# Patient Record
Sex: MALE | Race: OTHER | NOT HISPANIC OR LATINO
[De-identification: names, ages, dates, MRNs, and addresses within clinical notes are randomized per-mention and may not be internally consistent; named-entity substitution may affect disease eponyms.]

---

## 2017-04-20 ENCOUNTER — APPOINTMENT (OUTPATIENT)
Dept: SURGERY | Facility: CLINIC | Age: 77
End: 2017-04-20

## 2017-04-20 VITALS
BODY MASS INDEX: 25.1 KG/M2 | WEIGHT: 147 LBS | HEIGHT: 64 IN | HEART RATE: 65 BPM | DIASTOLIC BLOOD PRESSURE: 83 MMHG | SYSTOLIC BLOOD PRESSURE: 199 MMHG

## 2017-04-20 DIAGNOSIS — E04.1 NONTOXIC SINGLE THYROID NODULE: ICD-10-CM

## 2017-04-20 DIAGNOSIS — Z86.79 PERSONAL HISTORY OF OTHER DISEASES OF THE CIRCULATORY SYSTEM: ICD-10-CM

## 2017-04-21 RX ORDER — LEVOTHYROXINE SODIUM 0.17 MG/1
TABLET ORAL
Refills: 0 | Status: ACTIVE | COMMUNITY

## 2017-04-26 ENCOUNTER — APPOINTMENT (OUTPATIENT)
Dept: VASCULAR SURGERY | Facility: CLINIC | Age: 77
End: 2017-04-26
Payer: MEDICARE

## 2017-04-26 VITALS
DIASTOLIC BLOOD PRESSURE: 67 MMHG | HEART RATE: 64 BPM | BODY MASS INDEX: 25.1 KG/M2 | TEMPERATURE: 97.8 F | HEIGHT: 64 IN | SYSTOLIC BLOOD PRESSURE: 172 MMHG | WEIGHT: 147 LBS

## 2017-04-26 DIAGNOSIS — Z86.59 PERSONAL HISTORY OF OTHER MENTAL AND BEHAVIORAL DISORDERS: ICD-10-CM

## 2017-04-26 DIAGNOSIS — M79.605 PAIN IN RIGHT LEG: ICD-10-CM

## 2017-04-26 DIAGNOSIS — M79.604 PAIN IN RIGHT LEG: ICD-10-CM

## 2017-04-26 DIAGNOSIS — Z87.891 PERSONAL HISTORY OF NICOTINE DEPENDENCE: ICD-10-CM

## 2017-04-26 DIAGNOSIS — Z09 ENCOUNTER FOR FOLLOW-UP EXAMINATION AFTER COMPLETED TREATMENT FOR CONDITIONS OTHER THAN MALIGNANT NEOPLASM: ICD-10-CM

## 2017-04-26 DIAGNOSIS — Z86.79 PERSONAL HISTORY OF OTHER DISEASES OF THE CIRCULATORY SYSTEM: ICD-10-CM

## 2017-04-26 DIAGNOSIS — G25.81 RESTLESS LEGS SYNDROME: ICD-10-CM

## 2017-04-26 DIAGNOSIS — Z86.39 PERSONAL HISTORY OF OTHER ENDOCRINE, NUTRITIONAL AND METABOLIC DISEASE: ICD-10-CM

## 2017-04-26 PROCEDURE — 99214 OFFICE O/P EST MOD 30 MIN: CPT

## 2017-04-26 PROCEDURE — 93970 EXTREMITY STUDY: CPT

## 2017-04-27 ENCOUNTER — OTHER (OUTPATIENT)
Age: 77
End: 2017-04-27

## 2017-05-11 ENCOUNTER — RESULT REVIEW (OUTPATIENT)
Age: 77
End: 2017-05-11

## 2017-05-16 RX ORDER — CLONAZEPAM 1 MG/1
1 TABLET ORAL
Qty: 60 | Refills: 0 | Status: ACTIVE | COMMUNITY
Start: 2016-11-04

## 2017-05-16 RX ORDER — AZITHROMYCIN 250 MG/1
250 TABLET, FILM COATED ORAL
Qty: 6 | Refills: 0 | Status: ACTIVE | COMMUNITY
Start: 2016-10-28

## 2017-05-16 RX ORDER — VORTIOXETINE 20 MG/1
20 TABLET, FILM COATED ORAL
Qty: 90 | Refills: 0 | Status: ACTIVE | COMMUNITY
Start: 2016-10-20

## 2017-05-16 RX ORDER — SILDENAFIL CITRATE 100 MG/1
100 TABLET, FILM COATED ORAL
Qty: 10 | Refills: 0 | Status: ACTIVE | COMMUNITY
Start: 2017-04-03

## 2017-05-16 RX ORDER — SOLIFENACIN SUCCINATE 10 MG/1
10 TABLET, FILM COATED ORAL
Qty: 90 | Refills: 0 | Status: ACTIVE | COMMUNITY
Start: 2017-04-03

## 2017-05-16 RX ORDER — AMLODIPINE BESYLATE 10 MG/1
10 TABLET ORAL
Qty: 90 | Refills: 0 | Status: ACTIVE | COMMUNITY
Start: 2017-04-04

## 2017-05-16 RX ORDER — OMEPRAZOLE 40 MG/1
40 CAPSULE, DELAYED RELEASE ORAL
Qty: 30 | Refills: 0 | Status: ACTIVE | COMMUNITY
Start: 2017-03-13

## 2017-05-16 RX ORDER — FLUTICASONE PROPIONATE 50 UG/1
50 SPRAY, METERED NASAL
Qty: 16 | Refills: 0 | Status: ACTIVE | COMMUNITY
Start: 2017-03-22

## 2017-05-16 RX ORDER — DONEPEZIL HYDROCHLORIDE 10 MG/1
10 TABLET, ORALLY DISINTEGRATING ORAL
Qty: 90 | Refills: 0 | Status: ACTIVE | COMMUNITY
Start: 2017-04-17

## 2017-05-16 RX ORDER — PANCRELIPASE 36000; 180000; 114000 [USP'U]/1; [USP'U]/1; [USP'U]/1
36000-114000 CAPSULE, DELAYED RELEASE PELLETS ORAL
Qty: 120 | Refills: 0 | Status: ACTIVE | COMMUNITY
Start: 2017-03-13

## 2017-05-16 RX ORDER — MIRTAZAPINE 7.5 MG/1
7.5 TABLET, FILM COATED ORAL
Qty: 90 | Refills: 0 | Status: ACTIVE | COMMUNITY
Start: 2016-10-17

## 2017-05-16 RX ORDER — AMOXICILLIN AND CLAVULANATE POTASSIUM 875; 125 MG/1; MG/1
875-125 TABLET, COATED ORAL
Qty: 20 | Refills: 0 | Status: ACTIVE | COMMUNITY
Start: 2017-03-22

## 2017-05-16 RX ORDER — METOPROLOL SUCCINATE 50 MG/1
50 TABLET, EXTENDED RELEASE ORAL
Qty: 90 | Refills: 0 | Status: ACTIVE | COMMUNITY
Start: 2016-10-14

## 2017-06-07 ENCOUNTER — OTHER (OUTPATIENT)
Age: 77
End: 2017-06-07

## 2017-07-19 ENCOUNTER — APPOINTMENT (OUTPATIENT)
Dept: SURGERY | Facility: HOSPITAL | Age: 77
End: 2017-07-19

## 2017-10-09 ENCOUNTER — APPOINTMENT (OUTPATIENT)
Dept: NUCLEAR MEDICINE | Facility: HOSPITAL | Age: 77
End: 2017-10-09
Payer: MEDICARE

## 2017-10-09 ENCOUNTER — OUTPATIENT (OUTPATIENT)
Dept: OUTPATIENT SERVICES | Facility: HOSPITAL | Age: 77
LOS: 1 days | End: 2017-10-09
Payer: MEDICARE

## 2017-10-09 DIAGNOSIS — C73 MALIGNANT NEOPLASM OF THYROID GLAND: ICD-10-CM

## 2017-10-09 PROCEDURE — 99203 OFFICE O/P NEW LOW 30 MIN: CPT

## 2017-10-10 ENCOUNTER — APPOINTMENT (OUTPATIENT)
Dept: NUCLEAR MEDICINE | Facility: HOSPITAL | Age: 77
End: 2017-10-10

## 2017-10-11 ENCOUNTER — APPOINTMENT (OUTPATIENT)
Dept: NUCLEAR MEDICINE | Facility: HOSPITAL | Age: 77
End: 2017-10-11

## 2017-10-13 ENCOUNTER — APPOINTMENT (OUTPATIENT)
Dept: NUCLEAR MEDICINE | Facility: HOSPITAL | Age: 77
End: 2017-10-13

## 2017-10-13 PROCEDURE — 96372 THER/PROPH/DIAG INJ SC/IM: CPT

## 2017-10-13 PROCEDURE — 78018 THYROID MET IMAGING BODY: CPT

## 2017-10-13 PROCEDURE — 78020 THYROID MET UPTAKE: CPT

## 2017-10-13 PROCEDURE — A9528: CPT

## 2017-10-13 PROCEDURE — 78018 THYROID MET IMAGING BODY: CPT | Mod: 26

## 2017-10-13 PROCEDURE — 78020 THYROID MET UPTAKE: CPT | Mod: 26

## 2017-10-16 ENCOUNTER — OUTPATIENT (OUTPATIENT)
Dept: OUTPATIENT SERVICES | Facility: HOSPITAL | Age: 77
LOS: 1 days | End: 2017-10-16
Payer: MEDICARE

## 2017-10-16 ENCOUNTER — APPOINTMENT (OUTPATIENT)
Dept: NUCLEAR MEDICINE | Facility: HOSPITAL | Age: 77
End: 2017-10-16
Payer: MEDICARE

## 2017-10-16 DIAGNOSIS — C73 MALIGNANT NEOPLASM OF THYROID GLAND: ICD-10-CM

## 2017-10-17 ENCOUNTER — APPOINTMENT (OUTPATIENT)
Dept: NUCLEAR MEDICINE | Facility: HOSPITAL | Age: 77
End: 2017-10-17

## 2017-10-18 ENCOUNTER — APPOINTMENT (OUTPATIENT)
Dept: NUCLEAR MEDICINE | Facility: HOSPITAL | Age: 77
End: 2017-10-18

## 2017-10-18 PROCEDURE — 79005 NUCLEAR RX ORAL ADMIN: CPT | Mod: 26

## 2017-10-25 ENCOUNTER — APPOINTMENT (OUTPATIENT)
Dept: NUCLEAR MEDICINE | Facility: HOSPITAL | Age: 77
End: 2017-10-25

## 2017-10-25 PROCEDURE — 78018 THYROID MET IMAGING BODY: CPT | Mod: 26

## 2017-10-25 PROCEDURE — A9517: CPT

## 2017-10-25 PROCEDURE — 79005 NUCLEAR RX ORAL ADMIN: CPT

## 2017-10-25 PROCEDURE — 78018 THYROID MET IMAGING BODY: CPT

## 2017-10-25 PROCEDURE — 96372 THER/PROPH/DIAG INJ SC/IM: CPT

## 2018-06-19 ENCOUNTER — APPOINTMENT (OUTPATIENT)
Dept: INTERNAL MEDICINE | Facility: CLINIC | Age: 78
End: 2018-06-19

## 2018-06-19 VITALS
SYSTOLIC BLOOD PRESSURE: 130 MMHG | BODY MASS INDEX: 23.92 KG/M2 | WEIGHT: 135 LBS | OXYGEN SATURATION: 99 % | DIASTOLIC BLOOD PRESSURE: 60 MMHG | HEART RATE: 69 BPM | HEIGHT: 63 IN

## 2018-06-26 ENCOUNTER — APPOINTMENT (OUTPATIENT)
Dept: INTERNAL MEDICINE | Facility: CLINIC | Age: 78
End: 2018-06-26

## 2018-07-11 ENCOUNTER — APPOINTMENT (OUTPATIENT)
Dept: INTERNAL MEDICINE | Facility: CLINIC | Age: 78
End: 2018-07-11

## 2018-07-11 ENCOUNTER — APPOINTMENT (OUTPATIENT)
Dept: VASCULAR SURGERY | Facility: CLINIC | Age: 78
End: 2018-07-11
Payer: MEDICARE

## 2018-07-11 VITALS
TEMPERATURE: 98.6 F | SYSTOLIC BLOOD PRESSURE: 146 MMHG | WEIGHT: 135 LBS | DIASTOLIC BLOOD PRESSURE: 63 MMHG | HEIGHT: 64.5 IN | BODY MASS INDEX: 22.77 KG/M2 | HEART RATE: 66 BPM

## 2018-07-11 PROCEDURE — 93970 EXTREMITY STUDY: CPT

## 2018-07-11 PROCEDURE — 99212 OFFICE O/P EST SF 10 MIN: CPT

## 2018-07-12 ENCOUNTER — APPOINTMENT (OUTPATIENT)
Dept: INTERNAL MEDICINE | Facility: CLINIC | Age: 78
End: 2018-07-12

## 2018-07-19 ENCOUNTER — APPOINTMENT (OUTPATIENT)
Dept: INTERNAL MEDICINE | Facility: CLINIC | Age: 78
End: 2018-07-19

## 2018-07-20 ENCOUNTER — APPOINTMENT (OUTPATIENT)
Dept: CT IMAGING | Facility: IMAGING CENTER | Age: 78
End: 2018-07-20

## 2018-07-26 ENCOUNTER — APPOINTMENT (OUTPATIENT)
Dept: INTERNAL MEDICINE | Facility: CLINIC | Age: 78
End: 2018-07-26

## 2018-08-09 ENCOUNTER — APPOINTMENT (OUTPATIENT)
Dept: CT IMAGING | Facility: IMAGING CENTER | Age: 78
End: 2018-08-09
Payer: MEDICARE

## 2018-08-09 ENCOUNTER — OUTPATIENT (OUTPATIENT)
Dept: OUTPATIENT SERVICES | Facility: HOSPITAL | Age: 78
LOS: 1 days | End: 2018-08-09
Payer: MEDICARE

## 2018-08-09 DIAGNOSIS — Z00.8 ENCOUNTER FOR OTHER GENERAL EXAMINATION: ICD-10-CM

## 2018-08-09 PROCEDURE — 74177 CT ABD & PELVIS W/CONTRAST: CPT | Mod: 26

## 2018-08-09 PROCEDURE — 82565 ASSAY OF CREATININE: CPT

## 2018-08-09 PROCEDURE — 74177 CT ABD & PELVIS W/CONTRAST: CPT

## 2022-07-15 ENCOUNTER — INPATIENT (INPATIENT)
Facility: HOSPITAL | Age: 82
LOS: 11 days | Discharge: NURSING, REHAB OR LONG-TERM CARE | DRG: 40 | End: 2022-07-27
Attending: PSYCHIATRY & NEUROLOGY | Admitting: PSYCHIATRY & NEUROLOGY
Payer: MEDICARE

## 2022-07-15 VITALS — HEIGHT: 64 IN

## 2022-07-15 LAB
ALBUMIN SERPL ELPH-MCNC: 4.7 G/DL — SIGNIFICANT CHANGE UP (ref 3.3–5)
ALP SERPL-CCNC: 91 U/L — SIGNIFICANT CHANGE UP (ref 40–120)
ALT FLD-CCNC: 25 U/L — SIGNIFICANT CHANGE UP (ref 10–45)
ANION GAP SERPL CALC-SCNC: 14 MMOL/L — SIGNIFICANT CHANGE UP (ref 5–17)
AST SERPL-CCNC: 33 U/L — SIGNIFICANT CHANGE UP (ref 10–40)
BASOPHILS # BLD AUTO: 0.02 K/UL — SIGNIFICANT CHANGE UP (ref 0–0.2)
BASOPHILS NFR BLD AUTO: 0.4 % — SIGNIFICANT CHANGE UP (ref 0–2)
BILIRUB SERPL-MCNC: 0.5 MG/DL — SIGNIFICANT CHANGE UP (ref 0.2–1.2)
BUN SERPL-MCNC: 25 MG/DL — HIGH (ref 7–23)
CALCIUM SERPL-MCNC: 9.9 MG/DL — SIGNIFICANT CHANGE UP (ref 8.4–10.5)
CHLORIDE SERPL-SCNC: 101 MMOL/L — SIGNIFICANT CHANGE UP (ref 96–108)
CO2 SERPL-SCNC: 23 MMOL/L — SIGNIFICANT CHANGE UP (ref 22–31)
CREAT SERPL-MCNC: 0.93 MG/DL — SIGNIFICANT CHANGE UP (ref 0.5–1.3)
EGFR: 81 ML/MIN/1.73M2 — SIGNIFICANT CHANGE UP
EOSINOPHIL # BLD AUTO: 0.32 K/UL — SIGNIFICANT CHANGE UP (ref 0–0.5)
EOSINOPHIL NFR BLD AUTO: 6.6 % — HIGH (ref 0–6)
GLUCOSE SERPL-MCNC: 123 MG/DL — HIGH (ref 70–99)
HCT VFR BLD CALC: 35.8 % — LOW (ref 39–50)
HGB BLD-MCNC: 12 G/DL — LOW (ref 13–17)
IMM GRANULOCYTES NFR BLD AUTO: 0.2 % — SIGNIFICANT CHANGE UP (ref 0–1.5)
LYMPHOCYTES # BLD AUTO: 2.06 K/UL — SIGNIFICANT CHANGE UP (ref 1–3.3)
LYMPHOCYTES # BLD AUTO: 42.7 % — SIGNIFICANT CHANGE UP (ref 13–44)
MCHC RBC-ENTMCNC: 27.5 PG — SIGNIFICANT CHANGE UP (ref 27–34)
MCHC RBC-ENTMCNC: 33.5 GM/DL — SIGNIFICANT CHANGE UP (ref 32–36)
MCV RBC AUTO: 81.9 FL — SIGNIFICANT CHANGE UP (ref 80–100)
MONOCYTES # BLD AUTO: 0.52 K/UL — SIGNIFICANT CHANGE UP (ref 0–0.9)
MONOCYTES NFR BLD AUTO: 10.8 % — SIGNIFICANT CHANGE UP (ref 2–14)
NEUTROPHILS # BLD AUTO: 1.9 K/UL — SIGNIFICANT CHANGE UP (ref 1.8–7.4)
NEUTROPHILS NFR BLD AUTO: 39.3 % — LOW (ref 43–77)
NRBC # BLD: 0 /100 WBCS — SIGNIFICANT CHANGE UP (ref 0–0)
PLATELET # BLD AUTO: 118 K/UL — LOW (ref 150–400)
POTASSIUM SERPL-MCNC: 4 MMOL/L — SIGNIFICANT CHANGE UP (ref 3.5–5.3)
POTASSIUM SERPL-SCNC: 4 MMOL/L — SIGNIFICANT CHANGE UP (ref 3.5–5.3)
PROT SERPL-MCNC: 7.8 G/DL — SIGNIFICANT CHANGE UP (ref 6–8.3)
RBC # BLD: 4.37 M/UL — SIGNIFICANT CHANGE UP (ref 4.2–5.8)
RBC # FLD: 12.8 % — SIGNIFICANT CHANGE UP (ref 10.3–14.5)
SODIUM SERPL-SCNC: 138 MMOL/L — SIGNIFICANT CHANGE UP (ref 135–145)
TROPONIN T, HIGH SENSITIVITY RESULT: 15 NG/L — SIGNIFICANT CHANGE UP (ref 0–51)
WBC # BLD: 4.83 K/UL — SIGNIFICANT CHANGE UP (ref 3.8–10.5)
WBC # FLD AUTO: 4.83 K/UL — SIGNIFICANT CHANGE UP (ref 3.8–10.5)

## 2022-07-15 PROCEDURE — 0042T: CPT | Mod: MA

## 2022-07-15 PROCEDURE — 70496 CT ANGIOGRAPHY HEAD: CPT | Mod: 26,MA

## 2022-07-15 PROCEDURE — 70498 CT ANGIOGRAPHY NECK: CPT | Mod: 26,MA

## 2022-07-15 PROCEDURE — 99291 CRITICAL CARE FIRST HOUR: CPT | Mod: CS,GC

## 2022-07-15 PROCEDURE — 93010 ELECTROCARDIOGRAM REPORT: CPT | Mod: GC

## 2022-07-15 RX ORDER — ALTEPLASE 100 MG
49 KIT INTRAVENOUS ONCE
Refills: 0 | Status: COMPLETED | OUTPATIENT
Start: 2022-07-15 | End: 2022-07-15

## 2022-07-15 RX ORDER — SODIUM CHLORIDE 9 MG/ML
50 INJECTION INTRAMUSCULAR; INTRAVENOUS; SUBCUTANEOUS
Refills: 0 | Status: DISCONTINUED | OUTPATIENT
Start: 2022-07-15 | End: 2022-07-17

## 2022-07-15 RX ORDER — NICARDIPINE HYDROCHLORIDE 30 MG/1
5 CAPSULE, EXTENDED RELEASE ORAL
Qty: 40 | Refills: 0 | Status: DISCONTINUED | OUTPATIENT
Start: 2022-07-15 | End: 2022-07-17

## 2022-07-15 RX ORDER — ALTEPLASE 100 MG
5.4 KIT INTRAVENOUS ONCE
Refills: 0 | Status: COMPLETED | OUTPATIENT
Start: 2022-07-15 | End: 2022-07-15

## 2022-07-15 RX ORDER — ATORVASTATIN CALCIUM 80 MG/1
80 TABLET, FILM COATED ORAL AT BEDTIME
Refills: 0 | Status: DISCONTINUED | OUTPATIENT
Start: 2022-07-15 | End: 2022-07-27

## 2022-07-15 RX ADMIN — ALTEPLASE 49 MILLIGRAM(S): KIT at 23:42

## 2022-07-15 RX ADMIN — NICARDIPINE HYDROCHLORIDE 25 MG/HR: 30 CAPSULE, EXTENDED RELEASE ORAL at 23:57

## 2022-07-15 RX ADMIN — ALTEPLASE 324 MILLIGRAM(S): KIT at 23:41

## 2022-07-15 NOTE — ED PROVIDER NOTE - CLINICAL SUMMARY MEDICAL DECISION MAKING FREE TEXT BOX
Daniel Mejia, PGY-3- 82 year old male with L side facial droop, L arm weakness, L leg weakness, dysarthria 30 min prior to arrival. Code stroke on initial presentation. Pt with Penumbra of 10 mL localized to the right MCA territory on CTA. tPA consented and administered by neuro. Plan for admission to neuro stroke unit.

## 2022-07-15 NOTE — ED PROVIDER NOTE - OBJECTIVE STATEMENT
Daniel Mejia, PGY-3- 82 year old male with a pmhx of HTN, HLD, thyroid CA, Alzheimer, on daily 81 mg ASA, is brought to ED by EMS for evaluation of sudden onset dysarthria and L side weakness. Per EMS, pt was speaking with family when he had a sudden change in voice 30 min prior to arrival. Pt never had before NO hx of CVA/TIA. Lives at home with wife. Daniel Mejia, PGY-3- 82 year old male with a pmhx of HTN, HLD, thyroid CA, Alzheimer, on daily 81 mg ASA, is brought to ED by EMS for evaluation of sudden onset dysarthria and L side weakness. Per EMS, pt was speaking with family when he had a sudden change in voice 30 min prior to arrival. Pt never had before NO hx of CVA/TIA. Lives at home with wife.    Attendinyo male presents with dysarthria and left sided weakness.  last normal was around 10pm.  was acting normally until this time.

## 2022-07-15 NOTE — ED PROVIDER NOTE - PROGRESS NOTE DETAILS
received sign out from Dr Alaniz pending neuro recs, HCTr, and labs, ua. 82yr M on asa no prior strokes w acute left sided weakness and severe dysarthria from 10pm. DJ Daniel Mejia, PGY-3- patient received at sign out. Patient seen and evaluated by neuro. No contraindications to tPA. tPA consented and administered by neuro. Pt currently on tPA infusion. Will be admitted to stroke unit. Pt in NAD. received sign out from Dr Alaniz pending neuro recs, HCTr, and labs, ua. 82yr M on asa no prior strokes w acute left sided weakness and severe dysarthria from 10pm. STERLING  neurology dicussed and consented pt for tpa. monitored patient throughout, slight improvement in dysarthria. STERLING

## 2022-07-15 NOTE — ED PROVIDER NOTE - OTHER FINDINGS
Pt alert but confused this shift. Pt does not know where she is or that she had surgery. Pt reoriented. Call light within reach. Bed alarm on. Will continue to monitor and assess per unit protocol.   Electronically signed by Erasmo Knight RN on 5/23/2019 at 3:54 AM QTc 445 ms

## 2022-07-15 NOTE — H&P ADULT - NSHPPHYSICALEXAM_GEN_ALL_CORE
MS: Eyes open. Responds to verbal stimuli. Follows simple commands. Knows his name only. Does not know month. When asked of age, only says 1940.   Language: interval resolution of mild aphasia, remains with severe dysarthria  CN: PERRL 3mm b/l. V1-3 seneation intact to LT. Dense L facial droop sparing the forehead. VFF. EOMI. Tongue midline without atrophy  Motor: RUE 3/5, LUE fluctuating at its best 3/5, LLE fluctuating at its best 4/5, and RLE 5/5.   Sensation: Intact to LT throughout  Coordination: LUE FTN with dysmetria  Gait: Deferred due to L sided weakness

## 2022-07-15 NOTE — H&P ADULT - ASSESSMENT
82 y.o. M with PMH of thyroid ca s/p thyroidectomy in 2018, hyperthyroid, HTN, TIA (15-20 years ago), alzheimer's disease, and depression presented to [facility] ED due to L facial droop, L sided weakness and slurring of speech. LKN 22:00 hr on 7/15. Neuro exam fluctuating but remains with L hemiparesis and severe dysarthria. NIHSS 11 -> NIHSS 6 (does not know month, age, dysarthric, LUE drift, LUE ataxic). mRS 0.     tPA considered at 23:00 hr on 7/15  tPA consented with daughter Yanet at [phone] - no hx of recent GI bleeding, ICH, surgery  Establishment of IV and BP titration started 23:10. BP changes as noted below:  188/72 @ 23:07  177/93 @ 23:93  203/83 @ 23:12  197/86 @ 23:15  200/79 @ 23:18  205/79 @ 23:22  Given labetalol 10 mg IV @ 23:25 hr  188/67 @ 23:27  Given cardene at 23:28, started at 5 and titrated up  198/79 @ 23:30  154/57 @ 23:39  Pushed tPA 5.4 mg IV x1 @ 23:41-42. Bolus 49 mg started at 23:43     Impression: L hemiparesis and dysarthria likely due to R MCA acute ischemic stroke. Mechanism is unclear    Plan:  Meds  [] Hold AC/AP & Mechanical DVT prophylaxis with SCDs for 24 hrs and start chemical DVT PPx after 24 hours post tPA   [] atorvastatin 80mg qhs    Imaging  [] MRI brain w/o cont  [] head CT 24 hours post tpa or STAT head CT if neurological exam worsens  [] TTE with bubble study    Labs  [] A1c and Lipid panel    Other  [] Permissive HTN up to 180/105 mmHg for first 24 hours after the symptom onset followed by gradual normotension.   [] NPO until clears Dysphagia screen after 6 hours post tPA, otherwise Swallow evaluation  [] Telemetry monitoring   [] BGM goal 140-180  [] Neuro checks q2hr   [] PT/ OT  [] Stroke Education    Case discussed with stroke fellow Dr. Quiroz under supervision of stroke attending Dr. Chadwick 82 y.o. M with PMH of thyroid ca s/p thyroidectomy in 2018, hyperthyroid, HTN, TIA (15-20 years ago), alzheimer's disease, and depression presented to [facility] ED due to L facial droop, L sided weakness and slurring of speech. LKN 22:00 hr on 7/15. Neuro exam fluctuating but remains with L hemiparesis and severe dysarthria. NIHSS 11 -> NIHSS 6 (does not know month, age, dysarthric, LUE drift, LUE ataxic). mRS 0.     LKN 22:00 hr o 7/15  tPA considered at 23:00 hr on 7/15  tPA consented with daughter Yanet at [phone] - no hx of recent GI bleeding, ICH, surgery  Establishment of IV and BP titration started 23:10. BP changes as noted below:  188/72 @ 23:07  177/93 @ 23:93  203/83 @ 23:12  197/86 @ 23:15  200/79 @ 23:18  205/79 @ 23:22  Given labetalol 10 mg IV @ 23:25 hr  188/67 @ 23:27  Given cardene at 23:28, started at 5 and titrated up  198/79 @ 23:30  154/57 @ 23:39  Pushed tPA 5.4 mg IV x1 @ 23:41-42 hr on 7/15. Bolus 49 mg started at 23:43  hr on 7/15  tPA administered > 30 min due to aggressive HTN management with IV medications    Impression: L hemiparesis and dysarthria likely due to R MCA acute ischemic stroke. Mechanism is unclear    Plan:  Meds  [] Hold AC/AP & Mechanical DVT prophylaxis with SCDs for 24 hrs and start chemical DVT PPx after 24 hours post tPA   [] atorvastatin 80mg qhs    Imaging  [] MRI brain w/o cont  [] head CT 24 hours post tpa or STAT head CT if neurological exam worsens  [] TTE with bubble study    Labs  [] A1c and Lipid panel    Other  [] Permissive HTN up to 180/105 mmHg for first 24 hours after the symptom onset followed by gradual normotension.   [] NPO until clears Dysphagia screen after 6 hours post tPA, otherwise Swallow evaluation  [] Telemetry monitoring   [] BGM goal 140-180  [] Neuro checks q2hr   [] PT/ OT  [] Stroke Education    Case discussed with stroke fellow Dr. Quiroz under supervision of stroke attending Dr. Chadwick 82 y.o. M with PMH of thyroid ca s/p thyroidectomy in 2018, hyperthyroid, HTN, TIA (15-20 years ago), alzheimer's disease, and depression presented to [facility] ED due to L facial droop, L sided weakness and slurring of speech. LKN 22:00 hr on 7/15. Neuro exam fluctuating but remains with L hemiparesis and severe dysarthria. NIHSS 11 -> NIHSS 6 (does not know month, age, dysarthric, LUE drift, LUE ataxic). mRS 0.     LKN 22:00 hr o 7/15  tPA considered at 23:00 hr on 7/15  tPA consented with daughter Yanet at [phone] - no hx of recent GI bleeding, ICH, surgery  Establishment of IV and BP titration started 23:10. BP changes as noted below:  188/72 @ 23:07  177/93 @ 23:93  203/83 @ 23:12  197/86 @ 23:15  200/79 @ 23:18  205/79 @ 23:22  Given labetalol 10 mg IV @ 23:25 hr  188/67 @ 23:27  Given cardene at 23:28, started at 5 and titrated up  198/79 @ 23:30  154/57 @ 23:39  Pushed tPA 5.4 mg IV x1 @ 23:41-42 hr on 7/15. Bolus 49 mg started at 23:43  hr on 7/15  tPA administered > 30 min due to aggressive HTN management with IV medications    Not a thrombectomy candidate due to no LVO    Impression: L hemiparesis and dysarthria likely due to R MCA acute ischemic stroke. Mechanism is unclear    Plan:  Meds  [] Hold AC/AP & Mechanical DVT prophylaxis with SCDs for 24 hrs and start chemical DVT PPx after 24 hours post tPA   [] atorvastatin 80mg qhs    Imaging  [] MRI brain w/o cont  [] head CT 24 hours post tpa or STAT head CT if neurological exam worsens  [] TTE with bubble study    Labs  [] A1c and Lipid panel    Other  [] Permissive HTN up to 180/105 mmHg for first 24 hours after the symptom onset followed by gradual normotension.   [] NPO until clears Dysphagia screen after 6 hours post tPA, otherwise Swallow evaluation  [] Telemetry monitoring   [] BGM goal 140-180  [] Neuro checks q2hr   [] PT/ OT  [] Stroke Education    Case discussed with stroke fellow Dr. Quiroz under supervision of stroke attending Dr. Chadwick

## 2022-07-15 NOTE — ED PROVIDER NOTE - NSICDXPASTMEDICALHX_GEN_ALL_CORE_FT
PAST MEDICAL HISTORY:  CVA (cerebral vascular accident)     Depressive disorder, not elsewhere classified     Hypertension

## 2022-07-15 NOTE — ED PROVIDER NOTE - PHYSICAL EXAMINATION
General: well appearing, no acute distress, AOx3  Skin: no rash, no pallor  Head: normocephalic, atraumatic  Eyes: clear conjunctiva, EOMI, PERRL   ENMT: airway patent, no nasal discharge  Cardiovascular: normal rate, normal rhythm, S1/S2  Pulmonary: clear to auscultation bilaterally, no rales, rhonchi, or wheeze  Abdomen: soft, nontender  Musculoskeletal: moving extremities well, no deformity  Neuro: left side facial droop, slurred speech, L arm 0/5 strength, L leg with drift, R side strength intact   Psych: normal mood, normal affect

## 2022-07-15 NOTE — H&P ADULT - HISTORY OF PRESENT ILLNESS
82 y.o. M with PMH of thyroid ca s/p thyroidectomy in 2018, hyperthyroid, HTN, TIA (15-20 years ago), alzheimer's disease, and depression presented to [facility] ED due to L facial droop, L sided weakness and slurring of speech. LKN 22:00 hr on 7/15. Was asked by wife to take out trash and came back into the house ~22:15, started to have sx. Takes aspirin at home. Denies any toxic habits. No recent illnesses. Denies any headache, chest pain, numbness/tingling throughout the extremities.     NIHSS 11 for does not know month and age, L facial palsy, LUE some effort, LLE drift, mild aphasia, severe dysarthria  mRS 0

## 2022-07-15 NOTE — H&P ADULT - ATTENDING COMMENTS
code stroke called and patient received tpa now in stroke unit.   Briefly   82 y.o. M with thyroid ca s/p thyroidectomy in 2018, hyperthyroid, HTN, TIA (15-20 years ago), alzheimer's disease, and depression presented to [facility] ED due to L facial droop, L sided weakness and slurring of speech. LKN 22:00 hr on 7/15. NIHSS 11 -> NIHSS 6 (does not know month, age, dysarthric, LUE drift, LUE ataxic). mRS 0.   LKN 22:00 hr o 7/15  delay in tpa for BP control  Pushed tPA 5.4 mg IV x1 @ 23:41-42 hr on 7/15. Bolus 49 mg started at 23:43  hr on 7/15  tPA administered > 30 min due to aggressive HTN management with IV medications  Not a thrombectomy candidate due to no LVO  CTH neg  CTA h/n no LVO  CTP with R hemisphere mismatch  + COVID     Impression: L hemiparesis and dysarthria likely due to R MCA acute ischemic stroke. possible embolic vs 2/2 COVID now s/p tpa     Plan:  - post tpa protocol  - repeat CTH at 24hrs and if stable start ASA and dvt ppx   - check d dimer, APLS and inflammatory markers given covid.   - High dose statin therapy - atorvastatin 40mg PO daily. LDL goal <70mg/dL.  - MRI brain w/o  - Hemoglobin A1c and lipid panel  - TTE  - telemetry  - PT/OT/SS/SLP, OOBC  - permissive HTN to 180mmHg.  - check FS, glucose control <180  - GI/DVT ppx  - Counseling on diet, exercise, and medication adherence was done  - Counseling on smoking cessation and alcohol consumption offered when appropriate.  - Pain assessed and judicious use of narcotics when appropriate was discussed.    - Stroke education given when appropriate.  - Importance of fall prevention discussed.   - Differential diagnosis and plan of care discussed with patient and/or family and primary team  - Thank you for allowing me to participate in the care of this patient. Call with questions.   Igor Chadwick MD  Vascular Neurology

## 2022-07-15 NOTE — H&P ADULT - NSHPLABSRESULTS_GEN_ALL_CORE
.  LABS:                         12.0   4.83  )-----------( 118      ( 15 Jul 2022 22:56 )             35.8     07-15    138  |  101  |  25<H>  ----------------------------<  123<H>  4.0   |  23  |  0.93    Ca    9.9      15 Jul 2022 22:56    TPro  7.8  /  Alb  4.7  /  TBili  0.5  /  DBili  x   /  AST  33  /  ALT  25  /  AlkPhos  91  07-15              RADIOLOGY, EKG & ADDITIONAL TESTS: Reviewed.   < from: CT Angio Brain Stroke Protocol  w/ IV Cont (07.15.22 @ 23:12) >      IMPRESSION:    CTA neck and brain: No proximal large vessel intracranial occlusion.   Atherosclerotic calcifications contributing to moderate luminal stenosis   of the proximal left internal carotid artery by NASCET criteria.    CT Perfusion: No core infarct identified. Penumbra of 10 mL localized to   the right MCA territory.    These findings were discussed with Dr. Arroyo at 7/15/2022 11:17 PM by Dr. Duval with read back confirmation.    --- End of Report ---    < end of copied text >    < from: CT Brain Stroke Protocol (07.15.22 @ 23:09) >    IMPRESSION:    No acute intracranial hemorrhage, mass effect, or midline shift. If   clinical symptoms persist or worsen, more sensitive evaluation with brain   MRI may be obtained, if no contraindications exist.    These findings were discussed with Dr. Arroyo at 7/15/2022 11:02 PM by Dr. Duval with read back confirmation.    --- End of Report ---    < end of copied text >

## 2022-07-16 DIAGNOSIS — I63.9 CEREBRAL INFARCTION, UNSPECIFIED: ICD-10-CM

## 2022-07-16 LAB
A1C WITH ESTIMATED AVERAGE GLUCOSE RESULT: 6.1 % — HIGH (ref 4–5.6)
ANION GAP SERPL CALC-SCNC: 16 MMOL/L — SIGNIFICANT CHANGE UP (ref 5–17)
APTT BLD: 31.3 SEC — SIGNIFICANT CHANGE UP (ref 27.5–35.5)
BUN SERPL-MCNC: 24 MG/DL — HIGH (ref 7–23)
CALCIUM SERPL-MCNC: 10 MG/DL — SIGNIFICANT CHANGE UP (ref 8.4–10.5)
CHLORIDE SERPL-SCNC: 102 MMOL/L — SIGNIFICANT CHANGE UP (ref 96–108)
CHOLEST SERPL-MCNC: 189 MG/DL — SIGNIFICANT CHANGE UP
CO2 SERPL-SCNC: 22 MMOL/L — SIGNIFICANT CHANGE UP (ref 22–31)
CREAT SERPL-MCNC: 0.84 MG/DL — SIGNIFICANT CHANGE UP (ref 0.5–1.3)
D DIMER BLD IA.RAPID-MCNC: 2086 NG/ML DDU — HIGH
EGFR: 86 ML/MIN/1.73M2 — SIGNIFICANT CHANGE UP
ESTIMATED AVERAGE GLUCOSE: 128 MG/DL — HIGH (ref 68–114)
FIBRINOGEN PPP-MCNC: 349 MG/DL — SIGNIFICANT CHANGE UP (ref 330–520)
GLUCOSE SERPL-MCNC: 96 MG/DL — SIGNIFICANT CHANGE UP (ref 70–99)
HCT VFR BLD CALC: 39.5 % — SIGNIFICANT CHANGE UP (ref 39–50)
HDLC SERPL-MCNC: 61 MG/DL — SIGNIFICANT CHANGE UP
HGB BLD-MCNC: 13.1 G/DL — SIGNIFICANT CHANGE UP (ref 13–17)
INR BLD: 1.01 RATIO — SIGNIFICANT CHANGE UP (ref 0.88–1.16)
LIPID PNL WITH DIRECT LDL SERPL: 108 MG/DL — HIGH
MCHC RBC-ENTMCNC: 27.1 PG — SIGNIFICANT CHANGE UP (ref 27–34)
MCHC RBC-ENTMCNC: 33.2 GM/DL — SIGNIFICANT CHANGE UP (ref 32–36)
MCV RBC AUTO: 81.8 FL — SIGNIFICANT CHANGE UP (ref 80–100)
MRSA PCR RESULT.: SIGNIFICANT CHANGE UP
NON HDL CHOLESTEROL: 128 MG/DL — SIGNIFICANT CHANGE UP
NRBC # BLD: 0 /100 WBCS — SIGNIFICANT CHANGE UP (ref 0–0)
PLATELET # BLD AUTO: 127 K/UL — LOW (ref 150–400)
POTASSIUM SERPL-MCNC: 4 MMOL/L — SIGNIFICANT CHANGE UP (ref 3.5–5.3)
POTASSIUM SERPL-SCNC: 4 MMOL/L — SIGNIFICANT CHANGE UP (ref 3.5–5.3)
PROTHROM AB SERPL-ACNC: 11.7 SEC — SIGNIFICANT CHANGE UP (ref 10.5–13.4)
RBC # BLD: 4.83 M/UL — SIGNIFICANT CHANGE UP (ref 4.2–5.8)
RBC # FLD: 12.9 % — SIGNIFICANT CHANGE UP (ref 10.3–14.5)
S AUREUS DNA NOSE QL NAA+PROBE: SIGNIFICANT CHANGE UP
SARS-COV-2 RNA SPEC QL NAA+PROBE: DETECTED
SARS-COV-2 RNA SPEC QL NAA+PROBE: DETECTED
SODIUM SERPL-SCNC: 140 MMOL/L — SIGNIFICANT CHANGE UP (ref 135–145)
TRIGL SERPL-MCNC: 102 MG/DL — SIGNIFICANT CHANGE UP
WBC # BLD: 6.15 K/UL — SIGNIFICANT CHANGE UP (ref 3.8–10.5)
WBC # FLD AUTO: 6.15 K/UL — SIGNIFICANT CHANGE UP (ref 3.8–10.5)

## 2022-07-16 PROCEDURE — 70450 CT HEAD/BRAIN W/O DYE: CPT | Mod: 26

## 2022-07-16 RX ORDER — LANOLIN ALCOHOL/MO/W.PET/CERES
3 CREAM (GRAM) TOPICAL AT BEDTIME
Refills: 0 | Status: DISCONTINUED | OUTPATIENT
Start: 2022-07-16 | End: 2022-07-17

## 2022-07-16 RX ORDER — OLANZAPINE 15 MG/1
2.5 TABLET, FILM COATED ORAL ONCE
Refills: 0 | Status: DISCONTINUED | OUTPATIENT
Start: 2022-07-16 | End: 2022-07-16

## 2022-07-16 RX ORDER — QUETIAPINE FUMARATE 200 MG/1
25 TABLET, FILM COATED ORAL AT BEDTIME
Refills: 0 | Status: DISCONTINUED | OUTPATIENT
Start: 2022-07-16 | End: 2022-07-27

## 2022-07-16 RX ORDER — ASPIRIN/CALCIUM CARB/MAGNESIUM 324 MG
81 TABLET ORAL DAILY
Refills: 0 | Status: DISCONTINUED | OUTPATIENT
Start: 2022-07-17 | End: 2022-07-18

## 2022-07-16 RX ORDER — ENOXAPARIN SODIUM 100 MG/ML
40 INJECTION SUBCUTANEOUS EVERY 24 HOURS
Refills: 0 | Status: DISCONTINUED | OUTPATIENT
Start: 2022-07-17 | End: 2022-07-18

## 2022-07-16 RX ORDER — CHLORHEXIDINE GLUCONATE 213 G/1000ML
1 SOLUTION TOPICAL DAILY
Refills: 0 | Status: DISCONTINUED | OUTPATIENT
Start: 2022-07-16 | End: 2022-07-27

## 2022-07-16 RX ORDER — OLANZAPINE 15 MG/1
2.5 TABLET, FILM COATED ORAL EVERY 8 HOURS
Refills: 0 | Status: DISCONTINUED | OUTPATIENT
Start: 2022-07-16 | End: 2022-07-17

## 2022-07-16 RX ADMIN — CHLORHEXIDINE GLUCONATE 1 APPLICATION(S): 213 SOLUTION TOPICAL at 12:26

## 2022-07-16 RX ADMIN — QUETIAPINE FUMARATE 25 MILLIGRAM(S): 200 TABLET, FILM COATED ORAL at 22:16

## 2022-07-16 RX ADMIN — OLANZAPINE 2.5 MILLIGRAM(S): 15 TABLET, FILM COATED ORAL at 21:18

## 2022-07-16 RX ADMIN — ATORVASTATIN CALCIUM 80 MILLIGRAM(S): 80 TABLET, FILM COATED ORAL at 22:17

## 2022-07-16 RX ADMIN — OLANZAPINE 2.5 MILLIGRAM(S): 15 TABLET, FILM COATED ORAL at 10:20

## 2022-07-16 RX ADMIN — Medication 1 MILLIGRAM(S): at 06:02

## 2022-07-16 NOTE — SWALLOW BEDSIDE ASSESSMENT ADULT - COMMENTS
7/16 Provider contact note: Pt Aox1-2, restless, constantly trying to get OOB. Neuro exam pt dysarthria fluctuating from mild to severe    dysphagia screen noted to be passed on 7/15 and then failed 7/15 with noted cough/throat clearing on 20mL water    SWALLOW HISTORY: pt known to this serviec since Nov 2009 s/p small acute infarcts involving the right lentiform nucleus and possible anterior limb of the right internal capsule; pt with complains of globus sensation with dry food and some difficulty swallowing. Most recent MBS rx regular solids/thin liquids. "Patient presents with mild, functional  ishmael-pharyngeal dysphagia with inconsistent delay in trigger of swallow reflex with thin liquids. One episode of trace laryngeal penetration with thin liquids, which was spontaneously cleared from the laryngeal vestibule."    7/15 imaging  IMPRESSION:  CTA neck and brain: No proximal large vessel intracranial occlusion. Atherosclerotic calcifications contributing to moderate luminal stenosis of the proximal left internal carotid artery by NASCET criteria.    CT Perfusion: No core infarct identified. Penumbra of 10 mL localized to the right MCA territory. Audible swallow potentially indicative of incoordination of swallow (increased with thinner viscosities of liquids vs moderately thick liquids) No further chewables administered given lack of dentition and poor mastication and oral management 7/16 Provider contact note: Pt Aox1-2, restless, constantly trying to get OOB. Neuro exam pt dysarthria fluctuating from mild to severe    dysphagia screen noted to be passed on 7/15 and then failed 7/16 with noted cough/throat clearing on 20mL water    SWALLOW HISTORY: pt known to this serviec since Nov 2009 s/p small acute infarcts involving the right lentiform nucleus and possible anterior limb of the right internal capsule; pt with complains of globus sensation with dry food and some difficulty swallowing. Most recent MBS rx regular solids/thin liquids. "Patient presents with mild, functional  ishmael-pharyngeal dysphagia with inconsistent delay in trigger of swallow reflex with thin liquids. One episode of trace laryngeal penetration with thin liquids, which was spontaneously cleared from the laryngeal vestibule."    7/15 imaging  IMPRESSION:  CTA neck and brain: No proximal large vessel intracranial occlusion. Atherosclerotic calcifications contributing to moderate luminal stenosis of the proximal left internal carotid artery by NASCET criteria.    CT Perfusion: No core infarct identified. Penumbra of 10 mL localized to the right MCA territory.

## 2022-07-16 NOTE — OCCUPATIONAL THERAPY INITIAL EVALUATION ADULT - PERTINENT HX OF CURRENT PROBLEM, REHAB EVAL
83yo M PMH of thyroid ca s/p thyroidectomy in 2018, hyperthyroid, HTN, TIA (15-20 yrs ago), Alzheimer's disease, and depression presented to [facility] ED due to L facial droop, L sided weakness and slurring of speech. LKN 22:00 hr on 7/15. Pt asked by wife to take out trash and came back into the house ~22:15, started to have symptoms. No recent illnesses. Denies any headache, chest pain, numbness/tingling throughout the extremities.

## 2022-07-16 NOTE — SWALLOW BEDSIDE ASSESSMENT ADULT - SLP PERTINENT HISTORY OF CURRENT PROBLEM
hx con't 7/15 failed dysphagia screen. NEURO consulted for R facial droop, AMS, dysarthria.  CTH showing chronic lacunar infarcts b/l including R caudate, L lentiform nucleus, R pontine. Chronic L occipital and R posterior inferior cerebellar infarcts. No acute findings noted. Labs notable for COVID 19 +. NIHSS: 7 (arouses to minor stimuli, does not know month or age, lower face paralysis, LUE FTN dysmetria, mild-mod dysarthria) MRS: 3 (walks with walker and needs some help with ADLs)  Impression: Somnolence, R lower facial droop, dysarthria due to multifactorial etiology. Localization likely diffuse brain dysfunction vs L hemispheric dysfunction. DDx include ongoing COVID infection since 7/10 (febrile), toxic metabolic (hyponatremia), and recrudescence. Dysarthria is difficult to localize but likely due to active infection. Exam finding of LUE dysmetria is likely chronic cerebellar infarct. Anisocoria is likely due to surgical pupil on R. hx con't  NEURO consulted for R facial droop, AMS, dysarthria.  CTH showing chronic lacunar infarcts b/l including R caudate, L lentiform nucleus, R pontine. Chronic L occipital and R posterior inferior cerebellar infarcts. No acute findings noted. Labs notable for COVID 19 +. NIHSS: 7 (arouses to minor stimuli, does not know month or age, lower face paralysis, LUE FTN dysmetria, mild-mod dysarthria) MRS: 3 (walks with walker and needs some help with ADLs)  Impression: Somnolence, R lower facial droop, dysarthria due to multifactorial etiology. Localization likely diffuse brain dysfunction vs L hemispheric dysfunction. DDx include ongoing COVID infection since 7/10 (febrile), toxic metabolic (hyponatremia), and recrudescence. Dysarthria is difficult to localize but likely due to active infection. Exam finding of LUE dysmetria is likely chronic cerebellar infarct. Anisocoria is likely due to surgical pupil on R.

## 2022-07-16 NOTE — SWALLOW BEDSIDE ASSESSMENT ADULT - SLP GENERAL OBSERVATIONS
Pt received at bedside +bed restraint+b/l wrist restraints (given that pt attempting to get out of bed) +severe dysarthria +confusion. Pt AOx2, pleasant and cooperative throughout evaluation. Given severity of dysarthria, unable to fully discern if pt may have a mild aphasia vs tangential comments (Alzheimer's) vs overall cognitive impairments given potential R brain dysfunction. Pt able to follow all simple 1-2 step commands; pt able to express all wants/needs, however, benefitted from closed ended questions to reduce length of verbal output in order to increase intelligibility. Pt received at bedside +bed restraint + b/l wrist restraints (given that pt attempting to get out of bed) +severe dysarthria +mild confusion. Session successfully completed in English; Ray attempted for support via language line, however, given angel Garcia reported no [facility]  available.Pt AOx2, pleasant and cooperative throughout evaluation. Given severity of dysarthria, unable to fully discern if pt may have a mild aphasia vs tangential comments (Alzheimer's) vs use of Kashmiri vs overall cognitive impairments given potential R brain dysfunction.  Pt able to follow all simple 1-2 step commands; pt able to express all wants/needs, however, benefitted from closed ended questions to reduce length of verbal output in order to increase intelligibility. Nik MCCALL [facility] reported that pt with baseline Alzheimer's, however, completely funtional in all ADLs, communication, and swallowing PTA. Pt received at bedside +bed restraint + b/l wrist restraints (given that pt attempting to get out of bed) +severe dysarthria +mild confusion. Session successfully completed in English; [facility] attempted for support via language line, however, given angel [facility]  [facility].Pt AOx2, pleasant and cooperative throughout evaluation. Given severity of dysarthria, unable to fully discern if pt may have a mild aphasia vs tangential comments (Alzheimer's) vs use of Kashmiri vs overall cognitive impairments given potential R brain dysfunction.  Pt able to follow all simple 1-2 step commands; pt able to express all wants/needs, however, benefitted from closed ended questions to reduce length of verbal output in order to increase intelligibility. Nik MCCALL [facility] reported that pt with baseline Alzheimer's, however, completely funtional in all ADLs, communication (100% did communicate in English and also speaks Kashmiri; daughter concerned that pt may have had a stroke and may be mixing up languages), and swallowing PTA. Daughter also reports pt with progressively worsening dysarthria throughout hospital stay. Pt received at bedside +bed restraint + b/l wrist restraints (given that pt attempting to get out of bed prior to evaluation) +severe dysarthria +mild confusion. Session successfully completed in English; [facility] attempted for support via language line, however, given angel Garcia reported no [place]  available.Pt AOx2, pleasant and cooperative throughout evaluation. Given severity of dysarthria, unable to fully discern if pt may have a mild aphasia vs tangential comments (Alzheimer's) vs use of Kashmiri vs overall cognitive impairments given potential R brain dysfunction.  Pt able to follow all simple 1-2 step commands; pt able to express all wants/needs, however, benefitted from closed ended questions to reduce length of verbal output in order to increase intelligibility. Nik MCCALL [facility] reported that pt with baseline Alzheimer's, however, completely funtional in all ADLs, communication (100% did communicate in English and also speaks Kashmiri; daughter concerned that pt may have had a stroke and may be mixing up languages), and swallowing PTA. Daughter also reports pt with progressively worsening dysarthria throughout hospital stay.

## 2022-07-16 NOTE — PHYSICAL THERAPY INITIAL EVALUATION ADULT - GENERAL OBSERVATIONS, REHAB EVAL
Pt rec'd semi-supine in bed in NAD, VSS, +tele monitoring, +posey vest, +B/L wrist restraints, COVID19 precautions, agreeable to PT

## 2022-07-16 NOTE — SPEECH LANGUAGE PATHOLOGY EVALUATION - H & P REVIEW
82 y.o. M with PMH of thyroid ca s/p thyroidectomy in 2018, hyperthyroid, HTN, TIA (15-20 years ago), alzheimer's disease, and depression presented to [facility] ED due to L facial droop, L sided weakness and slurring of speech. LKN 22:00 hr on 7/15. Was asked by wife to take out trash and came back into the house ~22:15, started to have sx. Takes aspirin at home. Denies any toxic habits. No recent illnesses. Denies any headache, chest pain, numbness/tingling throughout the extremities. NIHSS 11 -> NIHSS 6 (does not know month, age, dysarthric, LUE drift, LUE ataxic). mRS 0./yes 82 y.o. M with PMH of thyroid ca s/p thyroidectomy in 2018, hyperthyroid, HTN, TIA (15-20 years ago), alzheimer's disease, and depression presented to [facility] ED due to L facial droop, L sided weakness and slurring of speech. LKN 22:00 hr on 7/15. Was asked by wife to take out trash and came back into the house ~22:15, started to have sx. Takes aspirin at home. Denies any toxic habits. No recent illnesses. Denies any headache, chest pain, numbness/tingling throughout the extremities. NIHSS 11 -> NIHSS 6 (does not know month, age, dysarthric, LUE drift, LUE ataxic). mRS 0. s/p tPA 23:43  hr on 7/15. Impression: L hemiparesis and dysarthria likely due to R MCA acute ischemic stroke. Mechanism is unclear./yes

## 2022-07-16 NOTE — OCCUPATIONAL THERAPY INITIAL EVALUATION ADULT - TRANSFER TRAINING, PT EVAL
Goal: Pt will perform sit to stand, bed <> chair, toilet, tub and shower transfers with S within 2 weeks

## 2022-07-16 NOTE — SWALLOW BEDSIDE ASSESSMENT ADULT - PHARYNGEAL PHASE
No overt s/s of aspiration/Delayed pharyngeal swallow No overt s/s of aspiration; Audible swallow potentially indicative of incoordination of swallow (increased with thinner viscosities of liquids vs moderately thick liquids)/Delayed pharyngeal swallow delayed throat clear with mildly thick liquids and immediate cough post thin liquids via cup/Delayed pharyngeal swallow/Cough post oral intake

## 2022-07-16 NOTE — PATIENT PROFILE ADULT - PRO INTERPRETER NEED 2
Jacqueline. [address] 53 
[address] [facility] [address] [id number] 
[phone] Work/School Note Date: 9/16/2019 To Whom It May concern: 
 
Jen Su was seen and treated today in the emergency room by the following provider(s): 
Attending Provider: Sally Finney MD.   
 
Jen uS Return to work when cleared by own neurologist 
 
 
Sincerely, Davey Mckeon MD 
 
 
 
 English

## 2022-07-16 NOTE — SWALLOW BEDSIDE ASSESSMENT ADULT - SWALLOW EVAL: FEEDING ASSISTANCE
pt at time of evaluation with b/l wrist restraints requiring feeding; but independent at home/dependent

## 2022-07-16 NOTE — SPEECH LANGUAGE PATHOLOGY EVALUATION - COMMENTS
7/16 Provider contact note: Pt Aox1-2, restless, constantly trying to get OOB. Neuro exam pt dysarthria fluctuating from mild to severe    dysphagia screen noted to be passed on 7/15 and then failed 7/15 with noted cough/throat clearing on 20mL water    SWALLOW HISTORY: pt known to this serviec since Nov 2009 s/p small acute infarcts involving the right lentiform nucleus and possible anterior limb of the right internal capsule; pt with complains of globus sensation with dry food and some difficulty swallowing. Most recent MBS rx regular solids/thin liquids. "Patient presents with mild, functional  ishmael-pharyngeal dysphagia with inconsistent delay in trigger of swallow reflex with thin liquids. One episode of trace laryngeal penetration with thin liquids, which was spontaneously cleared from the laryngeal vestibule."    7/15 imaging  IMPRESSION:  CTA neck and brain: No proximal large vessel intracranial occlusion. Atherosclerotic calcifications contributing to moderate luminal stenosis of the proximal left internal carotid artery by NASCET criteria.    CT Perfusion: No core infarct identified. Penumbra of 10 mL localized to the right MCA territory. 7/16 Provider contact note: Pt Aox1-2, restless, constantly trying to get OOB. Neuro exam pt dysarthria fluctuating from mild to severe    dysphagia screen noted to be passed on 7/15 and then failed 7/16 with noted cough/throat clearing on 20mL water    SWALLOW HISTORY: pt known to this serviec since Nov 2009 s/p small acute infarcts involving the right lentiform nucleus and possible anterior limb of the right internal capsule; pt with complains of globus sensation with dry food and some difficulty swallowing. Most recent MBS rx regular solids/thin liquids. "Patient presents with mild, functional  ishmael-pharyngeal dysphagia with inconsistent delay in trigger of swallow reflex with thin liquids. One episode of trace laryngeal penetration with thin liquids, which was spontaneously cleared from the laryngeal vestibule."    7/15 imaging  IMPRESSION:  CTA neck and brain: No proximal large vessel intracranial occlusion. Atherosclerotic calcifications contributing to moderate luminal stenosis of the proximal left internal carotid artery by NASCET criteria.    CT Perfusion: No core infarct identified. Penumbra of 10 mL localized to the right MCA territory. GOALS:  Language: 1. Pt will improve expressive language skills for functional communication.  2. Pt will improve receptive language skills for functional communication.  3. Family/caregiver will demonstrate understanding/carryover of strategies to improve patient’s communication of wants/needs  Cognition: 1. Pt will improve cognitive-linguistic skills for functional communication. Unable to assess given b/l wrist restraints 7/16 Provider contact note: Pt Aox1-2, restless, constantly trying to get OOB. Neuro exam pt dysarthria fluctuating from mild to severe    dysphagia screen noted to be passed on 7/15 and then failed 7/16 with noted cough/throat clearing on 20mL water. 7/16 bedside swallow evaluation completed recommending puree with moderately thick liquids (overt s/s of aspiration across all other liquid consistencies and edentulous with no dentures present) with rx for objective swallow testing.    SWALLOW HISTORY: pt known to this serviec since Nov 2009 s/p small acute infarcts involving the right lentiform nucleus and possible anterior limb of the right internal capsule; pt with complains of globus sensation with dry food and some difficulty swallowing. Most recent MBS rx regular solids/thin liquids. "Patient presents with mild, functional  ishmael-pharyngeal dysphagia with inconsistent delay in trigger of swallow reflex with thin liquids. One episode of trace laryngeal penetration with thin liquids, which was spontaneously cleared from the laryngeal vestibule."    7/15 imaging  IMPRESSION:  CTA neck and brain: No proximal large vessel intracranial occlusion. Atherosclerotic calcifications contributing to moderate luminal stenosis of the proximal left internal carotid artery by NASCET criteria.    CT Perfusion: No core infarct identified. Penumbra of 10 mL localized to the right MCA territory.

## 2022-07-16 NOTE — PHYSICAL THERAPY INITIAL EVALUATION ADULT - PLANNED THERAPY INTERVENTIONS, PT EVAL
GOAL: Pt will ascend/descend (8) steps (I) with U HR and step over step pattern in 4 weeks./balance training/bed mobility training/gait training/strengthening/transfer training

## 2022-07-16 NOTE — SWALLOW BEDSIDE ASSESSMENT ADULT - SWALLOW EVAL: DIAGNOSIS
Pt is a 82 y.o. M with PMH of thyroid ca s/p thyroidectomy in 2018, hyperthyroid, HTN, TIA (15-20 years ago), alzheimer's disease, and depression presented to [facility] ED due to L facial droop, L sided weakness and slurring of speech. Pt with limited ROM of lingual and labial musculature and reduced L sided sensation. Pt p/w clinical s/s of oropharyngeal dysphagia characterized by reduced oral grading, reduced a-p transit and prolonged oral transport; with suspected premature spillage and incoordination of swallow. Overt s/s of aspiration present with mildly thick liquids and thin liquids as evidenced by coughing. Although no overt s/s of aspiration noted with minced and moist solids, given inefficnent and prolonged mastication, not a safe candidate at this time. No overt s/s of aspiraiton with puree and moderately thick liquids.

## 2022-07-16 NOTE — SWALLOW BEDSIDE ASSESSMENT ADULT - ASR SWALLOW DENTITION
edentulous; with dentures at home, not present at bedside at this time (daughter reports they are home)

## 2022-07-16 NOTE — SWALLOW BEDSIDE ASSESSMENT ADULT - MODE OF PRESENTATION
spoon/fed by clinician Pt with improved timeliness of swallow sequence with cup vs spoon/cup/spoon/fed by clinician

## 2022-07-16 NOTE — SPEECH LANGUAGE PATHOLOGY EVALUATION - SPECIFY REASON(S)
to clinically asses speech-language and cognitive-linguistic skills; consult reason "slurred speech" to clinically assess speech-language and cognitive-linguistic skills; consult reason "slurred speech"

## 2022-07-16 NOTE — ED ADULT NURSE NOTE - OBJECTIVE STATEMENT
82 year old male with a pmhx of HTN, HLD, thyroid CA, Alzheimer, on daily 81 mg ASA, is brought to ED by EMS for evaluation of sudden onset dysarthria and L side weakness.  Pt

## 2022-07-16 NOTE — SWALLOW BEDSIDE ASSESSMENT ADULT - ADDITIONAL RECOMMENDATIONS
Swallow goals: 1. Pt/family/caregiver will demonstrate understanding and carryover of dysphagia management (safe swallow guidelines, compensatory strategies, dysphagia diet).  2. Pt will complete dysphagia exercises to improve swallow function.  3. Pt will tolerate recommended diet with no overt, clinical s/s of aspiration.

## 2022-07-16 NOTE — SPEECH LANGUAGE PATHOLOGY EVALUATION - SLP GENERAL OBSERVATIONS
Pt received at bedside +bed restraint+b/l wrist restraints (given that pt attempting to get out of bed) +severe dysarthria +mild confusion. Session successfully completed in English; Ray attempted for support via language line, however, given angel Garcia reported no [facility]  available.Pt AOx2, pleasant and cooperative throughout evaluation. Given severity of dysarthria, unable to fully discern if pt may have a mild aphasia vs tangential comments (Alzheimer's) vs use of Kashmiri vs overall cognitive impairments given potential R brain dysfunction.  Pt able to follow all simple 1-2 step commands; pt able to express all wants/needs, however, benefitted from closed ended questions to reduce length of verbal output in order to increase intelligibility. Nik MCCALL [facility] reported that pt with baseline Alzheimer's, however, completely funtional in all ADLs, communication, and swallowing PTA. Pt received at bedside +bed restraint+b/l wrist restraints (given that pt attempting to get out of bed prior to evaluation) +severe dysarthria +mild confusion. Session successfully completed in English; Ray attempted for support via language line, however, given angel Garcia reported no [facility]  available.Pt AOx2, pleasant and cooperative throughout evaluation. Given severity of dysarthria, unable to fully discern if pt may have a mild aphasia vs tangential comments (Alzheimer's) vs use of Kashmiri vs overall cognitive impairments given potential R brain dysfunction.  Pt able to follow all simple 1-2 step commands; pt able to express all wants/needs, however, benefitted from closed ended questions to reduce length of verbal output in order to increase intelligibility. Nik MCCALL [facility] reported that pt with baseline Alzheimer's, however, completely functional in all ADLs, communication, and swallowing PTA. Pt received at bedside +bed restraint + b/l wrist restraints (given that pt attempting to get out of bed) +severe dysarthria +mild confusion. Session successfully completed in English; [facility] attempted for support via language line, however, given [facility]  [facility].Pt AOx2, pleasant and cooperative throughout evaluation. Given severity of dysarthria, unable to fully discern if pt may have a mild aphasia vs tangential comments (Alzheimer's) vs use of Kashmiri vs overall cognitive impairments given potential R brain dysfunction.  Pt able to follow all simple 1-2 step commands; pt able to express all wants/needs, however, benefitted from closed ended questions to reduce length of verbal output in order to increase intelligibility. Nik MCCALL [facility] reported that pt with baseline Alzheimer's, however, completely funtional in all ADLs, communication (100% did communicate in English and also speaks Kashmiri; daughter concerned that pt may have had a stroke and may be mixing up languages), and swallowing PTA. Daughter also reports pt with progressively worsening dysarthria throughout hospital stay.

## 2022-07-16 NOTE — PATIENT PROFILE ADULT - FALL HARM RISK - HARM RISK INTERVENTIONS
Assistance with ambulation/Assistance OOB with selected safe patient handling equipment/Communicate Risk of Fall with Harm to all staff/Monitor for mental status changes/Move patient closer to nurses' station/Reinforce activity limits and safety measures with patient and family/Reorient to person, place and time as needed/Tailored Fall Risk Interventions/Toileting schedule using arm’s reach rule for commode and bathroom/Use of alarms - bed, chair and/or voice tab/Visual Cue: Yellow wristband and red socks/Bed in lowest position, wheels locked, appropriate side rails in place/Call bell, personal items and telephone in reach/Instruct patient to call for assistance before getting out of bed or chair/Non-slip footwear when patient is out of bed/[place] to call system/Physically safe environment - no spills, clutter or unnecessary equipment/Purposeful Proactive Rounding/Room/bathroom lighting operational, light cord in reach

## 2022-07-16 NOTE — SWALLOW BEDSIDE ASSESSMENT ADULT - ASR SWALLOW ASPIRATION MONITOR
Monitor for s/s aspiration/laryngeal penetration. If noted:  D/C p.o. intake, provide non-oral nutrition/hydration/meds, and contact this service @ [phone]/change of breathing pattern/cough/gurgly voice/fever/pneumonia/throat clearing/upper respiratory infection

## 2022-07-16 NOTE — SWALLOW BEDSIDE ASSESSMENT ADULT - SWALLOW EVAL: RECOMMENDED FEEDING/EATING TECHNIQUES
allow for swallow between intakes/check mouth frequently for oral residue/pocketing/maintain upright posture during/after eating for 30 mins/small sips/bites

## 2022-07-16 NOTE — OCCUPATIONAL THERAPY INITIAL EVALUATION ADULT - PRECAUTIONS/LIMITATIONS, REHAB EVAL
NIHSS 11, MRS 0, L facial palsy, LUE some effort, LLE drift, mild aphasia, severe dysarthria/aspiration precautions/fall precautions/seizure precautions

## 2022-07-16 NOTE — SPEECH LANGUAGE PATHOLOGY EVALUATION - SLP DIAGNOSIS
82 y.o. M with PMH of thyroid ca s/p thyroidectomy in 2018, hyperthyroid, HTN, TIA (15-20 years ago), alzheimer's disease, and depression presented to [facility] ED due to L facial droop, L sided weakness and slurring of speech. Pt p/w mild aphasia superimposed by cognitive impairments and severe dysarthria. Pt successfully completes a majority of simple 1-2 step directives (limited tasks given restraints), reduced ability to complete complex yes/no; pt does appear to understand most discourse given acknowledgement of all rationale provided during exam. Severe dysarthria affecting exam results to a degree given reduced precision of articulation and overall intellgibility ~20%. Pt may have mild difficulty with word finding/responsive naming. Pt can express simple wants/needs and benefits from simple questions and encouraged to provide short responses to increase intelligibility and facilitate communication.

## 2022-07-16 NOTE — PHYSICAL THERAPY INITIAL EVALUATION ADULT - ADDITIONAL COMMENTS
Social hx obtained from pt's daughter. Pt lives with his spouse in a split level home +8 steps to negotiate to second floor with HR. PTA pt was ambulating (I) without an AD and was (I) with all ADLS. As per pt's daughter, family recently purchased home in [place] for patient to be able to live with family.

## 2022-07-16 NOTE — SWALLOW BEDSIDE ASSESSMENT ADULT - ASR SWALLOW LINGUAL MOBILITY
small black dot on anterior R lingual surface; unable to be wiped/brushed away (MICHAELA Gomez made aware); pt able to protrude tongue, however, with limited ROM overall/impaired anterior elevation/impaired left lateral movement/impaired right lateral movement

## 2022-07-16 NOTE — OCCUPATIONAL THERAPY INITIAL EVALUATION ADULT - LIVES WITH, PROFILE
History obtained from daughter on phone who is MD. Pt currently in [place] with spouse in home being put up for sale, split level home, 7 steps to enter with bilateral rails, 8 steps with +rail t o ascend to 2nd floor bedroom, walkin shower. no grab bars, no shower chair. Pt daughter states family has new home in [place], 1 step to enter without rail. 1st floor setup with additional family living on 2nd floor/spouse

## 2022-07-16 NOTE — OCCUPATIONAL THERAPY INITIAL EVALUATION ADULT - DIAGNOSIS, OT EVAL
Pt currently presents with decreased endurance, balance, facial droop, slurred speech, command follow and strength limiting independence with ADLs and functional mobility.

## 2022-07-16 NOTE — SWALLOW BEDSIDE ASSESSMENT ADULT - ORAL PREPARATORY PHASE
Given reduced labial seal, pt implemented compensatory strategy of sucking in bolus off spoon/Reduced oral grading/Anterior loss of bolus prolonged and inefficient mastication with munch-chew pattern/Reduced oral grading/Decreased mastication ability

## 2022-07-16 NOTE — PHYSICAL THERAPY INITIAL EVALUATION ADULT - PERTINENT HX OF CURRENT PROBLEM, REHAB EVAL
82 y.o. M with PMH of thyroid ca s/p thyroidectomy in 2018, hyperthyroid, HTN, TIA (15-20 years ago), alzheimer's disease, and depression presented to [facility] ED due to L facial droop, L sided weakness and slurring of speech. LKN 22:00 hr on 7/15. Was asked by wife to take out trash and came back into the house ~22:15, started to have sx.

## 2022-07-16 NOTE — SWALLOW BEDSIDE ASSESSMENT ADULT - H & P REVIEW
82 y.o. M with PMH of thyroid ca s/p thyroidectomy in 2018, hyperthyroid, HTN, TIA (15-20 years ago), alzheimer's disease, and depression presented to [facility] ED due to L facial droop, L sided weakness and slurring of speech. LKN 22:00 hr on 7/15. Was asked by wife to take out trash and came back into the house ~22:15, started to have sx. Takes aspirin at home. Denies any toxic habits. No recent illnesses. Denies any headache, chest pain, numbness/tingling throughout the extremities. NIHSS 11 -> NIHSS 6 (does not know month, age, dysarthric, LUE drift, LUE ataxic). mRS 0./yes 82 y.o. M with PMH of thyroid ca s/p thyroidectomy in 2018, hyperthyroid, HTN, TIA (15-20 years ago), alzheimer's disease, and depression presented to [facility] ED due to L facial droop, L sided weakness and slurring of speech. LKN 22:00 hr on 7/15. Was asked by wife to take out trash and came back into the house ~22:15, started to have sx. Takes aspirin at home. Denies any toxic habits. No recent illnesses. Denies any headache, chest pain, numbness/tingling throughout the extremities. NIHSS 11 -> NIHSS 6 (does not know month, age, dysarthric, LUE drift, LUE ataxic). mRS 0. s/p tPA 23:43  hr on 7/15. Impression: L hemiparesis and dysarthria likely due to R MCA acute ischemic stroke. Mechanism is unclear./yes

## 2022-07-16 NOTE — SWALLOW BEDSIDE ASSESSMENT ADULT - NS SPL SWALLOW CLINIC TRIAL FT
Straw attempted, however, pt with inadequate intra-oral pressure with multiple attempts to achieve bolus to oral cavity contact with audible inspiratory effort

## 2022-07-16 NOTE — OCCUPATIONAL THERAPY INITIAL EVALUATION ADULT - ADL RETRAINING, OT EVAL
Goal: Pt will perform UB/LB dressing independently within 2 weeks. Goal: Pt will perform bathing independently with appropriate DME within 2 weeks

## 2022-07-16 NOTE — SWALLOW BEDSIDE ASSESSMENT ADULT - ORAL PHASE
Decreased anterior-posterior movement of the bolus/Delayed oral transit time suspect premature spillage/Decreased anterior-posterior movement of the bolus/Delayed oral transit time

## 2022-07-16 NOTE — PHYSICAL THERAPY INITIAL EVALUATION ADULT - PRECAUTIONS/LIMITATIONS, REHAB EVAL
NIHSS 11 for does not know month and age, L facial palsy, LUE some effort, LLE drift, mild aphasia, severe dysarthria CT Brain stroke protocol 7/15: No acute intracranial hemorrhage, mass effect, or midline shift.  If clinical symptoms persist or worsen, more sensitive evaluation with brain MRI may be obtained, if no contraindications exist. CTA neck and brain: No proximal large vessel intracranial occlusion. Atherosclerotic calcifications contributing to moderate luminal stenosis of the proximal left internal carotid artery by NASCET criteria. CT Perfusion: No core infarct identified. Penumbra of 10 mL localized to the right MCA territory./fall precautions

## 2022-07-17 LAB
ANION GAP SERPL CALC-SCNC: 13 MMOL/L — SIGNIFICANT CHANGE UP (ref 5–17)
ANION GAP SERPL CALC-SCNC: 15 MMOL/L — SIGNIFICANT CHANGE UP (ref 5–17)
BUN SERPL-MCNC: 37 MG/DL — HIGH (ref 7–23)
BUN SERPL-MCNC: 38 MG/DL — HIGH (ref 7–23)
CALCIUM SERPL-MCNC: 9 MG/DL — SIGNIFICANT CHANGE UP (ref 8.4–10.5)
CALCIUM SERPL-MCNC: 9.3 MG/DL — SIGNIFICANT CHANGE UP (ref 8.4–10.5)
CHLORIDE SERPL-SCNC: 102 MMOL/L — SIGNIFICANT CHANGE UP (ref 96–108)
CHLORIDE SERPL-SCNC: 103 MMOL/L — SIGNIFICANT CHANGE UP (ref 96–108)
CO2 SERPL-SCNC: 22 MMOL/L — SIGNIFICANT CHANGE UP (ref 22–31)
CO2 SERPL-SCNC: 23 MMOL/L — SIGNIFICANT CHANGE UP (ref 22–31)
CREAT SERPL-MCNC: 1.2 MG/DL — SIGNIFICANT CHANGE UP (ref 0.5–1.3)
CREAT SERPL-MCNC: 1.27 MG/DL — SIGNIFICANT CHANGE UP (ref 0.5–1.3)
EGFR: 56 ML/MIN/1.73M2 — LOW
EGFR: 60 ML/MIN/1.73M2 — SIGNIFICANT CHANGE UP
GLUCOSE SERPL-MCNC: 105 MG/DL — HIGH (ref 70–99)
GLUCOSE SERPL-MCNC: 107 MG/DL — HIGH (ref 70–99)
HCT VFR BLD CALC: 37.1 % — LOW (ref 39–50)
HGB BLD-MCNC: 11.9 G/DL — LOW (ref 13–17)
MCHC RBC-ENTMCNC: 27.7 PG — SIGNIFICANT CHANGE UP (ref 27–34)
MCHC RBC-ENTMCNC: 32.1 GM/DL — SIGNIFICANT CHANGE UP (ref 32–36)
MCV RBC AUTO: 86.3 FL — SIGNIFICANT CHANGE UP (ref 80–100)
NRBC # BLD: 0 /100 WBCS — SIGNIFICANT CHANGE UP (ref 0–0)
PLATELET # BLD AUTO: 116 K/UL — LOW (ref 150–400)
POTASSIUM SERPL-MCNC: 4 MMOL/L — SIGNIFICANT CHANGE UP (ref 3.5–5.3)
POTASSIUM SERPL-MCNC: 7.8 MMOL/L — CRITICAL HIGH (ref 3.5–5.3)
POTASSIUM SERPL-SCNC: 4 MMOL/L — SIGNIFICANT CHANGE UP (ref 3.5–5.3)
POTASSIUM SERPL-SCNC: 7.8 MMOL/L — CRITICAL HIGH (ref 3.5–5.3)
RBC # BLD: 4.3 M/UL — SIGNIFICANT CHANGE UP (ref 4.2–5.8)
RBC # FLD: 13.3 % — SIGNIFICANT CHANGE UP (ref 10.3–14.5)
SODIUM SERPL-SCNC: 137 MMOL/L — SIGNIFICANT CHANGE UP (ref 135–145)
SODIUM SERPL-SCNC: 141 MMOL/L — SIGNIFICANT CHANGE UP (ref 135–145)
WBC # BLD: 5.49 K/UL — SIGNIFICANT CHANGE UP (ref 3.8–10.5)
WBC # FLD AUTO: 5.49 K/UL — SIGNIFICANT CHANGE UP (ref 3.8–10.5)

## 2022-07-17 PROCEDURE — 70551 MRI BRAIN STEM W/O DYE: CPT | Mod: 26

## 2022-07-17 RX ORDER — MEMANTINE HYDROCHLORIDE 10 MG/1
10 TABLET ORAL
Refills: 0 | Status: DISCONTINUED | OUTPATIENT
Start: 2022-07-17 | End: 2022-07-27

## 2022-07-17 RX ORDER — AMLODIPINE BESYLATE 2.5 MG/1
5 TABLET ORAL DAILY
Refills: 0 | Status: DISCONTINUED | OUTPATIENT
Start: 2022-07-17 | End: 2022-07-19

## 2022-07-17 RX ORDER — MIRTAZAPINE 45 MG/1
7.5 TABLET, ORALLY DISINTEGRATING ORAL AT BEDTIME
Refills: 0 | Status: DISCONTINUED | OUTPATIENT
Start: 2022-07-17 | End: 2022-07-27

## 2022-07-17 RX ORDER — SODIUM CHLORIDE 9 MG/ML
1000 INJECTION INTRAMUSCULAR; INTRAVENOUS; SUBCUTANEOUS
Refills: 0 | Status: DISCONTINUED | OUTPATIENT
Start: 2022-07-17 | End: 2022-07-18

## 2022-07-17 RX ORDER — DONEPEZIL HYDROCHLORIDE 10 MG/1
10 TABLET, FILM COATED ORAL AT BEDTIME
Refills: 0 | Status: DISCONTINUED | OUTPATIENT
Start: 2022-07-17 | End: 2022-07-27

## 2022-07-17 RX ORDER — LEVOTHYROXINE SODIUM 125 MCG
100 TABLET ORAL DAILY
Refills: 0 | Status: DISCONTINUED | OUTPATIENT
Start: 2022-07-17 | End: 2022-07-27

## 2022-07-17 RX ADMIN — MIRTAZAPINE 7.5 MILLIGRAM(S): 45 TABLET, ORALLY DISINTEGRATING ORAL at 22:13

## 2022-07-17 RX ADMIN — CHLORHEXIDINE GLUCONATE 1 APPLICATION(S): 213 SOLUTION TOPICAL at 12:16

## 2022-07-17 RX ADMIN — MEMANTINE HYDROCHLORIDE 10 MILLIGRAM(S): 10 TABLET ORAL at 17:47

## 2022-07-17 RX ADMIN — SODIUM CHLORIDE 75 MILLILITER(S): 9 INJECTION INTRAMUSCULAR; INTRAVENOUS; SUBCUTANEOUS at 09:10

## 2022-07-17 RX ADMIN — SODIUM CHLORIDE 50 MILLILITER(S): 9 INJECTION INTRAMUSCULAR; INTRAVENOUS; SUBCUTANEOUS at 09:11

## 2022-07-17 RX ADMIN — ENOXAPARIN SODIUM 40 MILLIGRAM(S): 100 INJECTION SUBCUTANEOUS at 00:49

## 2022-07-17 RX ADMIN — DONEPEZIL HYDROCHLORIDE 10 MILLIGRAM(S): 10 TABLET, FILM COATED ORAL at 22:13

## 2022-07-17 RX ADMIN — Medication 81 MILLIGRAM(S): at 12:16

## 2022-07-17 RX ADMIN — Medication 3 MILLIGRAM(S): at 00:49

## 2022-07-17 RX ADMIN — QUETIAPINE FUMARATE 25 MILLIGRAM(S): 200 TABLET, FILM COATED ORAL at 22:13

## 2022-07-17 RX ADMIN — Medication 100 MICROGRAM(S): at 09:10

## 2022-07-17 RX ADMIN — AMLODIPINE BESYLATE 5 MILLIGRAM(S): 2.5 TABLET ORAL at 09:10

## 2022-07-17 RX ADMIN — ATORVASTATIN CALCIUM 80 MILLIGRAM(S): 80 TABLET, FILM COATED ORAL at 22:12

## 2022-07-17 NOTE — PROGRESS NOTE ADULT - ASSESSMENT
ASSESSMENT:  82 y.o. M with PMH of thyroid ca s/p thyroidectomy in 2018, hyperthyroid, HTN, TIA (15-20 years ago), alzheimer's disease, and depression presented to [facility] ED due to L facial droop, L sided weakness and slurring of speech. LKN 22:00 hr on 7/15. Neuro exam fluctuating but remains with L hemiparesis and severe dysarthria. NIHSS 11 -> NIHSS 6 (does not know month, age, dysarthric, LUE drift, LUE ataxic). mRS 0.     LKN 22:00 hr o 7/15  tPA considered at 23:00 hr on 7/15  tPA consented with daughter Yanet at [phone] - no hx of recent GI bleeding, ICH, surgery  Establishment of IV and BP titration started 23:10. BP changes as noted below:  188/72 @ 23:07  177/93 @ 23:93  203/83 @ 23:12  197/86 @ 23:15  200/79 @ 23:18  205/79 @ 23:22  Given labetalol 10 mg IV @ 23:25 hr  188/67 @ 23:27  Given cardene at 23:28, started at 5 and titrated up  198/79 @ 23:30  154/57 @ 23:39  Pushed tPA 5.4 mg IV x1 @ 23:41-42 hr on 7/15. Bolus 49 mg started at 23:43  hr on 7/15  tPA administered > 30 min due to aggressive HTN management with IV medications    Not a thrombectomy candidate due to no LVO    Impression: L hemiparesis and dysarthria due to acute ischemic stroke R MCA . Mechanism is unclear Embolic secondary to ESUS v. hypercoagulable secondary to COVID 19 +     NEURO: Improving neurologically, Continue close monitoring for neurologic deterioration, permissive HTN followed by gradual normotension, HgA1C 6.1 %,  and titrate statin to LDL goal less than 70,  Physical therapy/OT appreciated and recommended JESSICA. SLP gerda appreciated.     ANTITHROMBOTIC THERAPY: continue ASA 81mg daily but will likely be switched to Eliquis given elevated D-Dimer ? hypercoagulable state secondary to Covid infection    PULMONARY: CXR clear, protecting airway, saturating well     CARDIOVASCULAR: check TTE with bubble, cardiac monitoring, gradually taper off Nicardipine drip, start Amlodipine 5 mg daily and gradually increase ( home meds Amlodipine 10 mg, Metoprolol ER 50mg daily)                               SBP goal: <140    GASTROINTESTINAL: passed dysphagia screen , aspiration precautions, MBS ordered and planned for 7/18.      Diet: Pureed with mod thickened liquids    RENAL: BUN/Cr slightly elevated 38/1.27, s/p straight cath on 07/17 given limited urine output overnight and started on NS 0.9 50ml/hr. Will continue to monitor closely.      Na Goal: Greater than 135     Rawls:    HEMATOLOGY: H/H stable, Platelets normal, fibrinogen 349, D-Dimer elevated 2086. Will likely need anticoagulation x 3 months with Repeat D-Dimer in 3 months. Eliquis will be started following repeat imaging demonstrating no hemorrhagic conversion.       DVT ppx: Lovenox for now and will d/c if Anticoagulated with Eliquis    ENDO: h/o thyroid CA s/p thyroidectomy, postsurgical hypothyroidism and will resume Levothyroxine 100 mcg daily    ID: Covid +, afebrile, no leukocytosis . Will obtain ID consult for possible treatment with Remdesevir.     OTHER:  Depression, Dementia with sundowning at baseline.  Restarted home meds including Seroquel 25 mg q hs, Mirtazepine 7.5 mg qhs, Donepezil 10 mg q hs, Namenda 10 mg BID.      DISPOSITION: Subacute Rehab once stable and workup is complete      CORE MEASURES:        Admission NIHSS:      TPA: [x] YES [] NO      LDL/HDL: 108/61     Depression Screen:       Statin Therapy: Atorvastatin     Dysphagia Screen: [X] PASS [] FAIL     Smoking [] YES [] NO      Afib [] YES [] NO     Stroke Education [] YES [] NO    Obtain screening lower extremity venous ultrasound in patients who meet 1 or more of the following criteria as patient is high risk for DVT/PE on admission:   [] History of DVT/PE  []Hypercoagulable states (Factor V Leiden, Cancer, OCP, etc. )  []Prolonged immobility (hemiplegia/hemiparesis/post operative or any other extended immobilization)  [] Transferred from outside facility (Rehab or Long term care)  [] Age </= to 50

## 2022-07-18 LAB
ANION GAP SERPL CALC-SCNC: 13 MMOL/L — SIGNIFICANT CHANGE UP (ref 5–17)
BUN SERPL-MCNC: 34 MG/DL — HIGH (ref 7–23)
CALCIUM SERPL-MCNC: 8.6 MG/DL — SIGNIFICANT CHANGE UP (ref 8.4–10.5)
CHLORIDE SERPL-SCNC: 103 MMOL/L — SIGNIFICANT CHANGE UP (ref 96–108)
CO2 SERPL-SCNC: 22 MMOL/L — SIGNIFICANT CHANGE UP (ref 22–31)
CREAT SERPL-MCNC: 1.07 MG/DL — SIGNIFICANT CHANGE UP (ref 0.5–1.3)
DRVVT SCREEN TO CONFIRM RATIO: SIGNIFICANT CHANGE UP
EGFR: 68 ML/MIN/1.73M2 — SIGNIFICANT CHANGE UP
GLUCOSE SERPL-MCNC: 93 MG/DL — SIGNIFICANT CHANGE UP (ref 70–99)
HCT VFR BLD CALC: 34.6 % — LOW (ref 39–50)
HCT VFR BLD CALC: 37.2 % — LOW (ref 39–50)
HGB BLD-MCNC: 11.3 G/DL — LOW (ref 13–17)
HGB BLD-MCNC: 12 G/DL — LOW (ref 13–17)
LA NT DPL PPP QL: 46.6 SEC — SIGNIFICANT CHANGE UP
MCHC RBC-ENTMCNC: 27.4 PG — SIGNIFICANT CHANGE UP (ref 27–34)
MCHC RBC-ENTMCNC: 27.8 PG — SIGNIFICANT CHANGE UP (ref 27–34)
MCHC RBC-ENTMCNC: 32.3 GM/DL — SIGNIFICANT CHANGE UP (ref 32–36)
MCHC RBC-ENTMCNC: 32.7 GM/DL — SIGNIFICANT CHANGE UP (ref 32–36)
MCV RBC AUTO: 83.8 FL — SIGNIFICANT CHANGE UP (ref 80–100)
MCV RBC AUTO: 86.1 FL — SIGNIFICANT CHANGE UP (ref 80–100)
NORMALIZED SCT PPP-RTO: 0.75 RATIO — SIGNIFICANT CHANGE UP (ref 0–1.16)
NORMALIZED SCT PPP-RTO: SIGNIFICANT CHANGE UP
NRBC # BLD: 0 /100 WBCS — SIGNIFICANT CHANGE UP (ref 0–0)
NRBC # BLD: 0 /100 WBCS — SIGNIFICANT CHANGE UP (ref 0–0)
PLATELET # BLD AUTO: 107 K/UL — LOW (ref 150–400)
PLATELET # BLD AUTO: 116 K/UL — LOW (ref 150–400)
POTASSIUM SERPL-MCNC: 4 MMOL/L — SIGNIFICANT CHANGE UP (ref 3.5–5.3)
POTASSIUM SERPL-SCNC: 4 MMOL/L — SIGNIFICANT CHANGE UP (ref 3.5–5.3)
RBC # BLD: 4.13 M/UL — LOW (ref 4.2–5.8)
RBC # BLD: 4.32 M/UL — SIGNIFICANT CHANGE UP (ref 4.2–5.8)
RBC # FLD: 12.9 % — SIGNIFICANT CHANGE UP (ref 10.3–14.5)
RBC # FLD: 13.1 % — SIGNIFICANT CHANGE UP (ref 10.3–14.5)
SODIUM SERPL-SCNC: 138 MMOL/L — SIGNIFICANT CHANGE UP (ref 135–145)
WBC # BLD: 4.91 K/UL — SIGNIFICANT CHANGE UP (ref 3.8–10.5)
WBC # BLD: 6.19 K/UL — SIGNIFICANT CHANGE UP (ref 3.8–10.5)
WBC # FLD AUTO: 4.91 K/UL — SIGNIFICANT CHANGE UP (ref 3.8–10.5)
WBC # FLD AUTO: 6.19 K/UL — SIGNIFICANT CHANGE UP (ref 3.8–10.5)

## 2022-07-18 PROCEDURE — 93321 DOPPLER ECHO F-UP/LMTD STD: CPT | Mod: 26

## 2022-07-18 PROCEDURE — 99233 SBSQ HOSP IP/OBS HIGH 50: CPT | Mod: FS

## 2022-07-18 PROCEDURE — 74230 X-RAY XM SWLNG FUNCJ C+: CPT | Mod: 26

## 2022-07-18 PROCEDURE — 70450 CT HEAD/BRAIN W/O DYE: CPT | Mod: 26

## 2022-07-18 PROCEDURE — 93308 TTE F-UP OR LMTD: CPT | Mod: 26

## 2022-07-18 PROCEDURE — 71045 X-RAY EXAM CHEST 1 VIEW: CPT | Mod: 26

## 2022-07-18 RX ORDER — APIXABAN 2.5 MG/1
5 TABLET, FILM COATED ORAL
Refills: 0 | Status: DISCONTINUED | OUTPATIENT
Start: 2022-07-18 | End: 2022-07-27

## 2022-07-18 RX ORDER — LANOLIN ALCOHOL/MO/W.PET/CERES
5 CREAM (GRAM) TOPICAL AT BEDTIME
Refills: 0 | Status: DISCONTINUED | OUTPATIENT
Start: 2022-07-18 | End: 2022-07-19

## 2022-07-18 RX ORDER — QUETIAPINE FUMARATE 200 MG/1
25 TABLET, FILM COATED ORAL ONCE
Refills: 0 | Status: DISCONTINUED | OUTPATIENT
Start: 2022-07-18 | End: 2022-07-19

## 2022-07-18 RX ORDER — PANTOPRAZOLE SODIUM 20 MG/1
40 TABLET, DELAYED RELEASE ORAL ONCE
Refills: 0 | Status: COMPLETED | OUTPATIENT
Start: 2022-07-18 | End: 2022-07-18

## 2022-07-18 RX ADMIN — ENOXAPARIN SODIUM 40 MILLIGRAM(S): 100 INJECTION SUBCUTANEOUS at 01:35

## 2022-07-18 RX ADMIN — Medication 100 MICROGRAM(S): at 06:20

## 2022-07-18 RX ADMIN — PANTOPRAZOLE SODIUM 40 MILLIGRAM(S): 20 TABLET, DELAYED RELEASE ORAL at 15:40

## 2022-07-18 RX ADMIN — ATORVASTATIN CALCIUM 80 MILLIGRAM(S): 80 TABLET, FILM COATED ORAL at 22:06

## 2022-07-18 RX ADMIN — DONEPEZIL HYDROCHLORIDE 10 MILLIGRAM(S): 10 TABLET, FILM COATED ORAL at 22:07

## 2022-07-18 RX ADMIN — CHLORHEXIDINE GLUCONATE 1 APPLICATION(S): 213 SOLUTION TOPICAL at 11:54

## 2022-07-18 RX ADMIN — Medication 30 MILLILITER(S): at 18:55

## 2022-07-18 RX ADMIN — SODIUM CHLORIDE 50 MILLILITER(S): 9 INJECTION INTRAMUSCULAR; INTRAVENOUS; SUBCUTANEOUS at 06:21

## 2022-07-18 RX ADMIN — MIRTAZAPINE 7.5 MILLIGRAM(S): 45 TABLET, ORALLY DISINTEGRATING ORAL at 22:06

## 2022-07-18 RX ADMIN — MEMANTINE HYDROCHLORIDE 10 MILLIGRAM(S): 10 TABLET ORAL at 06:20

## 2022-07-18 RX ADMIN — MEMANTINE HYDROCHLORIDE 10 MILLIGRAM(S): 10 TABLET ORAL at 17:03

## 2022-07-18 RX ADMIN — APIXABAN 5 MILLIGRAM(S): 2.5 TABLET, FILM COATED ORAL at 17:03

## 2022-07-18 RX ADMIN — QUETIAPINE FUMARATE 25 MILLIGRAM(S): 200 TABLET, FILM COATED ORAL at 22:07

## 2022-07-18 RX ADMIN — AMLODIPINE BESYLATE 5 MILLIGRAM(S): 2.5 TABLET ORAL at 06:21

## 2022-07-18 NOTE — SWALLOW VFSS/MBS ASSESSMENT ADULT - NS SWALLOW VFSS REC ASPIR MON
Monitor for s/s aspiration/laryngeal penetration. If noted:  D/C p.o. intake, provide non-oral nutrition/hydration/meds, and contact this service @ [phone]/change of breathing pattern/cough/gurgly voice/fever/pneumonia/throat clearing/upper respiratory infection

## 2022-07-18 NOTE — PROGRESS NOTE ADULT - ASSESSMENT
82 y.o. M with PMH of thyroid ca s/p thyroidectomy in 2018, hyperthyroid, HTN, TIA (15-20 years ago), alzheimer's disease, and depression presented to [facility] ED due to L facial droop, L sided weakness and slurring of speech. LKN 22:00 hr on 7/15. Neuro exam fluctuating but remains with L hemiparesis and severe dysarthria. NIHSS 11 -> NIHSS 6 (does not know month, age, dysarthric, LUE drift, LUE ataxic). mRS 0. tPA administered 5.4 mg IV x1 @ 23:41-42 hr on 7/15. Bolus 49 mg started at 23:43  hr on 7/15. tPA administered > 30 min due to aggressive HTN management with IV medications. He was not a thrombectomy candidate due to no LVO    Impression: L hemiparesis and dysarthria due to acute ischemic stroke R MCA . Mechanism is unclear Embolic secondary to ESUS v. hypercoagulable secondary to COVID 19 +     NEURO: Improving neurologically, Continue close monitoring for neurologic deterioration, permissive HTN followed by gradual normotension, HgA1C 6.1 %, - started on high intensity statin,  Physical therapy/OT appreciated and recommended JESSICA. SLP eval appreciated.     ANTITHROMBOTIC THERAPY: continue ASA 81mg daily but will likely be switched to Eliquis given elevated D-Dimer (2086)? hypercoagulable state secondary to Covid infection    PULMONARY: CXR clear, protecting airway, saturating well     CARDIOVASCULAR: check TTE with bubble, cardiac monitoring,off Nicardipine drip, started on Amlodipine 5 mg daily and gradually increase ( home meds Amlodipine 10 mg, Metoprolol ER 50mg daily)                               SBP goal: <140    GASTROINTESTINAL: passed dysphagia screen , aspiration precautions, MBS ordered and planned for 7/18.      Diet: Pureed with mod thickened liquids    RENAL: BUN/Cr slightly elevated 38/1.27, s/p straight cath on 07/17 given limited urine output overnight and started on NS 0.9 50ml/hr. Will continue to monitor closely.      Na Goal: Greater than 135     Rawls:    HEMATOLOGY:  Will likely need anticoagulation x 3 months with Repeat D-Dimer in 3 months. Eliquis will be started following repeat imaging demonstrating no hemorrhagic conversion.       DVT ppx: Lovenox for now and will d/c if Anticoagulated with Eliquis    ENDO: h/o thyroid CA s/p thyroidectomy, postsurgical hypothyroidism and will resume Levothyroxine 100 mcg daily    ID: Covid +, afebrile, no leukocytosis . Will obtain ID consult for possible treatment with Remdesevir.     OTHER:  Depression, Dementia with sundowning at baseline.  Restarted home meds including Seroquel 25 mg q hs, Mirtazepine 7.5 mg qhs, Donepezil 10 mg q hs, Namenda 10 mg BID.      DISPOSITION: Subacute Rehab once stable and workup is complete      CORE MEASURES:        Admission NIHSS:      TPA: [x] YES [] NO      LDL/HDL: 108/61     Depression Screen:       Statin Therapy: Atorvastatin     Dysphagia Screen: [X] PASS [] FAIL     Smoking [] YES [] NO      Afib [] YES [] NO     Stroke Education [] YES [] NO    Obtain screening lower extremity venous ultrasound in patients who meet 1 or more of the following criteria as patient is high risk for DVT/PE on admission:   [] History of DVT/PE  []Hypercoagulable states (Factor V Leiden, Cancer, OCP, etc. )  []Prolonged immobility (hemiplegia/hemiparesis/post operative or any other extended immobilization)  [] Transferred from outside facility (Rehab or Long term care)  [] Age </= to 50   82 y.o. M with PMH of thyroid ca s/p thyroidectomy in 2018, hyperthyroid, HTN, TIA (15-20 years ago), alzheimer's disease, and depression presented to [facility] ED due to L facial droop, L sided weakness and slurring of speech. LKN 22:00 hr on 7/15. Neuro exam fluctuating but remains with L hemiparesis and severe dysarthria. NIHSS 11 -> NIHSS 6 (does not know month, age, dysarthric, LUE drift, LUE ataxic). mRS 0. tPA administered 5.4 mg IV x1 @ 23:41-42 hr on 7/15. Bolus 49 mg started at 23:43  hr on 7/15. tPA administered > 30 min due to aggressive HTN management with IV medications. He was not a thrombectomy candidate due to no LVO    Impression: L hemiparesis and dysarthria due to acute ischemic stroke R MCA . Mechanism is unclear Embolic secondary to ESUS v. hypercoagulable secondary to COVID 19 +     NEURO: Improving neurologically, Continue close monitoring for neurologic deterioration, permissive HTN followed by gradual normotension, HgA1C 6.1 %, - started on high intensity statin,  Physical therapy/OT appreciated and recommended JESSICA. SLP eval appreciated.     ANTITHROMBOTIC THERAPY: Transitioned to Eliquis given elevated D-Dimer (2086) hypercoagulable state secondary to Covid infection    PULMONARY: CXR clear, protecting airway, saturating well     CARDIOVASCULAR: check TTE with bubble, cardiac monitoring, started on Amlodipine 5 mg daily and gradually increase ( home meds Amlodipine 10 mg, Metoprolol ER 50mg daily)                               SBP goal: <140    GASTROINTESTINAL: passed dysphagia screen , aspiration precautions, MBS ordered and planned for 7/18.      Diet: Pureed with mod thickened liquids    RENAL: BUN/Cr slightly elevated 38/1.27, s/p straight cath on 07/17 given limited urine output overnight and started on NS 0.9 50ml/hr. Will continue to monitor closely.      Na Goal: Greater than 135     Rawls:    HEMATOLOGY:  Will likely need anticoagulation for 1 month. Eliquis will be started following repeat imaging demonstrating no hemorrhagic conversion.       DVT ppx: Lovenox for now and will d/c if Anticoagulated with Eliquis    ENDO: h/o thyroid CA s/p thyroidectomy, postsurgical hypothyroidism and will resume Levothyroxine 100 mcg daily    ID: Covid +, afebrile, no leukocytosis . ID following: CXR pending, if fever will send blood cultures    OTHER:  Depression, Dementia with sundowning at baseline.  Restarted home meds including Seroquel 25 mg q hs, Mirtazepine 7.5 mg qhs, Donepezil 10 mg q hs, Namenda 10 mg BID.      DISPOSITION: Subacute Rehab once stable and workup is complete    CORE MEASURES:        Admission NIHSS:      TPA: [x] YES [] NO      LDL/HDL: 108/61     Depression Screen:       Statin Therapy: Atorvastatin     Dysphagia Screen: [X] PASS [] FAIL     Smoking [] YES [] NO      Afib [] YES [] NO     Stroke Education [] YES [] NO    Obtain screening lower extremity venous ultrasound in patients who meet 1 or more of the following criteria as patient is high risk for DVT/PE on admission:   [] History of DVT/PE  []Hypercoagulable states (Factor V Leiden, Cancer, OCP, etc. )  []Prolonged immobility (hemiplegia/hemiparesis/post operative or any other extended immobilization)  [] Transferred from outside facility (Rehab or Long term care)  [] Age </= to 50

## 2022-07-18 NOTE — SWALLOW VFSS/MBS ASSESSMENT ADULT - ORAL PHASE
Delayed oral transit time within functional limits moderate-maximal to the valleculae and pyriform sinuses/Incomplete tongue to palate contact/Uncontrolled bolus / spillover in ishmael-pharynx/Uncontrolled bolus / spillover in hypopharynx

## 2022-07-18 NOTE — PROGRESS NOTE ADULT - SUBJECTIVE AND OBJECTIVE BOX
THE PATIENT WAS SEEN AND EXAMINED BY ME WITH THE HOUSESTAFF AND STROKE TEAM DURING MORNING ROUNDS.   HPI:  82 y.o. M with PMH of thyroid ca s/p thyroidectomy in 2018, hyperthyroid, HTN, TIA (15-20 years ago), alzheimer's disease, and depression presented to [facility] ED due to L facial droop, L sided weakness and slurring of speech. LKN 22:00 hr on 7/15. Was asked by wife to take out trash and came back into the house ~22:15, started to have sx. Takes aspirin at home. Denies any toxic habits. No recent illnesses. Denies any headache, chest pain, numbness/tingling throughout the extremities. NIHSS 11 on admission. (He did not know month and age, L facial palsy, LUE some effort, LLE drift, mild aphasia, severe dysarthria), mRS 0.    SUBJECTIVE: No events overnight.  No new neurologic complaints.  ROS reported negative unless otherwise noted.    amLODIPine   Tablet 5 milliGRAM(s) Oral daily  aspirin enteric coated 81 milliGRAM(s) Oral daily  atorvastatin 80 milliGRAM(s) Oral at bedtime  chlorhexidine 2% Cloths 1 Application(s) Topical daily  donepezil 10 milliGRAM(s) Oral at bedtime  enoxaparin Injectable 40 milliGRAM(s) SubCutaneous every 24 hours  levothyroxine 100 MICROGram(s) Oral daily  memantine 10 milliGRAM(s) Oral two times a day  mirtazapine 7.5 milliGRAM(s) Oral at bedtime  QUEtiapine 25 milliGRAM(s) Oral at bedtime  sodium chloride 0.9%. 1000 milliLiter(s) IV Continuous <Continuous>      PHYSICAL EXAM:   Vital Signs Last 24 Hrs  T(C): 36.8 (17 Jul 2022 20:00), Max: 36.8 (17 Jul 2022 08:00)  T(F): 98.2 (17 Jul 2022 20:00), Max: 98.3 (17 Jul 2022 08:00)  HR: 59 (18 Jul 2022 06:00) (56 - 76)  BP: 147/55 (18 Jul 2022 06:00) (123/49 - 177/88)  BP(mean): 82 (18 Jul 2022 06:00) (69 - 110)  RR: 16 (18 Jul 2022 06:00) (16 - 25)  SpO2: 97% (18 Jul 2022 06:00) (97% - 100%)        General: No acute distress  HEENT: EOM intact, visual fields full  Abdomen: Soft, nontender, nondistended   Extremities: No edema    NEUROLOGICAL EXAM:  Mental status: Awake, alert, oriented to person only, follows simple commands inconsistently, mild aphasia,   Cranial Nerves: flattening of left NLF, no nystagmus, mod to severe dysarthria,  tongue midline  Motor exam: Normal tone, no drift, 4/5 RUE, 5/5 RLE, 4/5 LUE, 4/5 LLE, normal fine finger movements.  Sensation: Intact to light touch   Coordination/ Gait: + dysmetria LUE, gait not assessed    LABS:                        11.9   5.49  )-----------( 116      ( 17 Jul 2022 07:45 )             37.1    07-17    141  |  103  |  38<H>  ----------------------------<  105<H>  4.0   |  23  |  1.27    Ca    9.3      17 Jul 2022 09:14    IMAGING: Reviewed by me.     CT BRAIN 07.16.22  Developing acute infarct in the right middle cerebral artery vascular   territory, involving the ventral aspect of the right frontoparietal   convexity.  No hemorrhagic conversion.    CTA Neck and brain 07.15.22  No proximal large vessel intracranial occlusion.   Atherosclerotic calcifications contributing to moderate luminal stenosis   of the proximal left internal carotid artery by NASCET criteria.    CT Perfusion 07.15.22   No core infarct identified. Penumbra of 10 mL localized to   the right MCA territory.    CT BRAIN 07.15.22  No acute intracranial hemorrhage, mass effect, or midline shift. If   clinical symptoms persist or worsen, more sensitive evaluation with brain   MRI may be obtained, if no contraindications exist.

## 2022-07-18 NOTE — SWALLOW VFSS/MBS ASSESSMENT ADULT - ORAL PHASE COMMENTS
One episode of deep silent laryngeal penetration before the swallow over the arytenoids with incomplete retrieval. Noted during consecutive sips.

## 2022-07-18 NOTE — SWALLOW VFSS/MBS ASSESSMENT ADULT - ADDITIONAL RECOMMENDATIONS
Maintain good oral hygiene  Service will continue to follow with plan for diet upgrade pending placement of dentures

## 2022-07-18 NOTE — SWALLOW VFSS/MBS ASSESSMENT ADULT - SLP GENERAL OBSERVATIONS
Pt arrived in radiology secured in KENDRA chair, able to communicate some simple needs and follow some simple directions. + Dysarthria.

## 2022-07-18 NOTE — CONSULT NOTE ADULT - SUBJECTIVE AND OBJECTIVE BOX
HPI:   Patient is a 82y male with a past history of thyroid cancer/thyroidectomy,  dementia, HTN, who was admitted on 7/15 when he developed left facial droop and left sided weakness and speech difficulty.  He was brought to ER , felt to likely have a Rt MCA CVA, and he received TPA.  He has been stable, tested positive for Covid, has not had any fever or respiratory difficulty.  He is alert, has dysarthric speech, dementia limits history.It is unclear if he has been vaccinated.    REVIEW OF SYSTEMS:  All other review of systems negative (Comprehensive ROS)    PAST MEDICAL & SURGICAL HISTORY:  Depressive disorder, not elsewhere classified      Hypertension      CVA (cerebral vascular accident)      No significant past surgical history          Allergies    No Known Allergies    Intolerances        Antimicrobials Day #  :    Other Medications:  amLODIPine   Tablet 5 milliGRAM(s) Oral daily  apixaban 5 milliGRAM(s) Oral two times a day  atorvastatin 80 milliGRAM(s) Oral at bedtime  chlorhexidine 2% Cloths 1 Application(s) Topical daily  donepezil 10 milliGRAM(s) Oral at bedtime  levothyroxine 100 MICROGram(s) Oral daily  memantine 10 milliGRAM(s) Oral two times a day  mirtazapine 7.5 milliGRAM(s) Oral at bedtime  QUEtiapine 25 milliGRAM(s) Oral at bedtime  sodium chloride 0.9%. 1000 milliLiter(s) IV Continuous <Continuous>      FAMILY HISTORY: N/A      SOCIAL HISTORY:  Smoking:  x   ETOH: x    Drug Use: x      T(F): 98.4 (07-18-22 @ 08:00), Max: 98.4 (07-18-22 @ 08:00)  HR: 65 (07-18-22 @ 10:00)  BP: 142/62 (07-18-22 @ 10:00)  RR: 18 (07-18-22 @ 10:00)  SpO2: 100% (07-18-22 @ 10:00)  Wt(kg): --    PHYSICAL EXAM:  General: alert, no acute distress  Eyes:  anicteric, no conjunctival injection, no discharge  Oropharynx: no lesions or injection 	  Neck: supple, without adenopathy  Lungs: clear to auscultation  Heart: regular rate and rhythm; no murmur, rubs or gallops  Abdomen: soft, nondistended, nontender, without mass or organomegaly  Skin: no lesions  Extremities: no clubbing, cyanosis, or edema  Neurologic: alert, oriented, moves all extremities, ? mild left facial drrop    LAB RESULTS:                        11.3   4.91  )-----------( 107      ( 18 Jul 2022 06:24 )             34.6     07-18    138  |  103  |  34<H>  ----------------------------<  93  4.0   |  22  |  1.07    Ca    8.6      18 Jul 2022 06:22            MICROBIOLOGY:  RECENT CULTURES:        RADIOLOGY REVIEWED:  < from: CT Head No Cont (07.18.22 @ 09:18) >  IMPRESSION: Right MCA infarct is again seen with some hemorrhagic   transformation suspected.    --- End of Report ---    < end of copied text >  < from: MR Head No Cont (07.17.22 @ 16:16) >    IMPRESSION: Acute right MCA infarct.    --- End of Report ---    < end of copied text >

## 2022-07-18 NOTE — SWALLOW VFSS/MBS ASSESSMENT ADULT - H & P REVIEW
82 y.o. M with PMH of thyroid ca s/p thyroidectomy in 2018, hyperthyroid, HTN, TIA (15-20 years ago), alzheimer's disease, and depression presented to [facility] ED due to L facial droop, L sided weakness and slurring of speech. LKN 22:00 hr on 7/15. Was asked by wife to take out trash and came back into the house ~22:15, started to have sx. Takes aspirin at home. Denies any toxic habits. No recent illnesses. Denies any headache, chest pain, numbness/tingling throughout the extremities. NIHSS 11 -> NIHSS 6 (does not know month, age, dysarthric, LUE drift, LUE ataxic). mRS 0. s/p tPA 23:43  hr on 7/15. Impression: L hemiparesis and dysarthria likely due to R MCA acute ischemic stroke. Mechanism is unclear./yes

## 2022-07-18 NOTE — SWALLOW VFSS/MBS ASSESSMENT ADULT - DIAGNOSTIC IMPRESSIONS
Pt is an 83 y/o M admitted for L hemiparesis and dysarthria likely due to R MCA. Now presenting with 1. an oropharyngeal dysphagia marked by poor bolus formation, lateral loss of bolus, prolonged mastication and delayed oral transit time (with hard solids most likely related to edentulous state), uncontrolled loss, and deep silent laryngeal penetration of thin liquids during consecutive cup sips. 2. Dysarthria. 3. ? Aphasia.   Disorders: reduced lingual strength/ROM/Rate of motion, reduced tongue to palate contact, delay in trigger of the swallow reflex, reduced laryngeal closure, and signs of reduced supraglottic sensation.

## 2022-07-18 NOTE — SWALLOW VFSS/MBS ASSESSMENT ADULT - PHARYNGEAL PHASE COMMENTS
Mild deep silent laryngal penetration during the swallow over the laryngeal surface of the epiglottis and arytenoids during consecutive sips with incomplete retrieval.   Airway protection is maintained with single controlled cup sips.

## 2022-07-18 NOTE — SWALLOW VFSS/MBS ASSESSMENT ADULT - RECOMMENDED FEEDING/EATING TECHNIQUES
single controlled cup sips/allow for swallow between intakes/crush medication (when feasible)/maintain upright posture during/after eating for 30 mins/no straws/position upright (90 degrees)/small sips/bites

## 2022-07-19 LAB — SARS-COV-2 RNA SPEC QL NAA+PROBE: DETECTED

## 2022-07-19 PROCEDURE — 99233 SBSQ HOSP IP/OBS HIGH 50: CPT | Mod: FS

## 2022-07-19 PROCEDURE — 93970 EXTREMITY STUDY: CPT | Mod: 26

## 2022-07-19 RX ORDER — QUETIAPINE FUMARATE 200 MG/1
25 TABLET, FILM COATED ORAL ONCE
Refills: 0 | Status: DISCONTINUED | OUTPATIENT
Start: 2022-07-19 | End: 2022-07-19

## 2022-07-19 RX ORDER — LANOLIN ALCOHOL/MO/W.PET/CERES
5 CREAM (GRAM) TOPICAL
Refills: 0 | Status: DISCONTINUED | OUTPATIENT
Start: 2022-07-19 | End: 2022-07-27

## 2022-07-19 RX ORDER — HALOPERIDOL DECANOATE 100 MG/ML
2 INJECTION INTRAMUSCULAR ONCE
Refills: 0 | Status: COMPLETED | OUTPATIENT
Start: 2022-07-19 | End: 2022-07-19

## 2022-07-19 RX ORDER — ACETAMINOPHEN 500 MG
650 TABLET ORAL EVERY 6 HOURS
Refills: 0 | Status: DISCONTINUED | OUTPATIENT
Start: 2022-07-19 | End: 2022-07-27

## 2022-07-19 RX ORDER — AMLODIPINE BESYLATE 2.5 MG/1
5 TABLET ORAL ONCE
Refills: 0 | Status: COMPLETED | OUTPATIENT
Start: 2022-07-19 | End: 2022-07-19

## 2022-07-19 RX ORDER — METOPROLOL TARTRATE 50 MG
25 TABLET ORAL DAILY
Refills: 0 | Status: DISCONTINUED | OUTPATIENT
Start: 2022-07-19 | End: 2022-07-20

## 2022-07-19 RX ORDER — HYDRALAZINE HCL 50 MG
5 TABLET ORAL ONCE
Refills: 0 | Status: COMPLETED | OUTPATIENT
Start: 2022-07-19 | End: 2022-07-19

## 2022-07-19 RX ORDER — AMLODIPINE BESYLATE 2.5 MG/1
10 TABLET ORAL DAILY
Refills: 0 | Status: DISCONTINUED | OUTPATIENT
Start: 2022-07-20 | End: 2022-07-27

## 2022-07-19 RX ADMIN — APIXABAN 5 MILLIGRAM(S): 2.5 TABLET, FILM COATED ORAL at 17:37

## 2022-07-19 RX ADMIN — DONEPEZIL HYDROCHLORIDE 10 MILLIGRAM(S): 10 TABLET, FILM COATED ORAL at 22:51

## 2022-07-19 RX ADMIN — MEMANTINE HYDROCHLORIDE 10 MILLIGRAM(S): 10 TABLET ORAL at 06:34

## 2022-07-19 RX ADMIN — Medication 100 MICROGRAM(S): at 06:34

## 2022-07-19 RX ADMIN — ATORVASTATIN CALCIUM 80 MILLIGRAM(S): 80 TABLET, FILM COATED ORAL at 22:48

## 2022-07-19 RX ADMIN — QUETIAPINE FUMARATE 25 MILLIGRAM(S): 200 TABLET, FILM COATED ORAL at 22:51

## 2022-07-19 RX ADMIN — Medication 25 MILLIGRAM(S): at 17:38

## 2022-07-19 RX ADMIN — Medication 650 MILLIGRAM(S): at 22:01

## 2022-07-19 RX ADMIN — HALOPERIDOL DECANOATE 2 MILLIGRAM(S): 100 INJECTION INTRAMUSCULAR at 00:45

## 2022-07-19 RX ADMIN — Medication 5 MILLIGRAM(S): at 20:06

## 2022-07-19 RX ADMIN — Medication 650 MILLIGRAM(S): at 21:30

## 2022-07-19 RX ADMIN — Medication 5 MILLIGRAM(S): at 21:30

## 2022-07-19 RX ADMIN — APIXABAN 5 MILLIGRAM(S): 2.5 TABLET, FILM COATED ORAL at 06:34

## 2022-07-19 RX ADMIN — AMLODIPINE BESYLATE 5 MILLIGRAM(S): 2.5 TABLET ORAL at 06:34

## 2022-07-19 RX ADMIN — Medication 5 MILLIGRAM(S): at 01:26

## 2022-07-19 RX ADMIN — AMLODIPINE BESYLATE 5 MILLIGRAM(S): 2.5 TABLET ORAL at 13:37

## 2022-07-19 RX ADMIN — HALOPERIDOL DECANOATE 2 MILLIGRAM(S): 100 INJECTION INTRAMUSCULAR at 22:40

## 2022-07-19 RX ADMIN — MEMANTINE HYDROCHLORIDE 10 MILLIGRAM(S): 10 TABLET ORAL at 17:56

## 2022-07-19 RX ADMIN — MIRTAZAPINE 7.5 MILLIGRAM(S): 45 TABLET, ORALLY DISINTEGRATING ORAL at 22:49

## 2022-07-19 NOTE — PROGRESS NOTE ADULT - SUBJECTIVE AND OBJECTIVE BOX
CC: f/u for covid and CVA    Patient reports: he was restless and agitated earlier, required posy , meds, and he appears calm at this point.    REVIEW OF SYSTEMS:  All other review of systems negative (Comprehensive ROS)    Antimicrobials Day #  :off    Other Medications Reviewed  MEDICATIONS  (STANDING):  amLODIPine   Tablet 5 milliGRAM(s) Oral daily  apixaban 5 milliGRAM(s) Oral two times a day  atorvastatin 80 milliGRAM(s) Oral at bedtime  chlorhexidine 2% Cloths 1 Application(s) Topical daily  donepezil 10 milliGRAM(s) Oral at bedtime  levothyroxine 100 MICROGram(s) Oral daily  melatonin 5 milliGRAM(s) Oral at bedtime  memantine 10 milliGRAM(s) Oral two times a day  mirtazapine 7.5 milliGRAM(s) Oral at bedtime  QUEtiapine 25 milliGRAM(s) Oral at bedtime    T(F): 98.1 (07-19-22 @ 06:43), Max: 99.2 (07-18-22 @ 19:48)  HR: 70 (07-19-22 @ 06:43)  BP: 165/69 (07-19-22 @ 10:03)  RR: 18 (07-19-22 @ 10:03)  SpO2: 95% (07-19-22 @ 10:03)  Wt(kg): --    PHYSICAL EXAM:  General: alert, no acute distress, restrained  Eyes:  anicteric, no conjunctival injection, no discharge  Oropharynx: no lesions or injection 	  Neck: supple, without adenopathy  Lungs: clear to auscultation  Heart: regular rate and rhythm; no murmur, rubs or gallops  Abdomen: soft, nondistended, nontender, without mass or organomegaly  Skin: no lesions  Extremities: no clubbing, cyanosis, or edema  Neurologic: alert, oriented, speech dysarthric, moves all extremities    LAB RESULTS:                        12.0   6.19  )-----------( 116      ( 18 Jul 2022 14:08 )             37.2     07-18    138  |  103  |  34<H>  ----------------------------<  93  4.0   |  22  |  1.07    Ca    8.6      18 Jul 2022 06:22    d-dimer elevated      MICROBIOLOGY:  RECENT CULTURES:      RADIOLOGY REVIEWED:    < from: CT Head No Cont (07.18.22 @ 09:18) >  IMPRESSION: Right MCA infarct is again seen with some hemorrhagic   transformation suspected.    --- End of Report ---    < end of copied text >  < from: MR Head No Cont (07.17.22 @ 16:16) >  IMPRESSION: Acute right MCA infarct.    -CXR looks clear to me.

## 2022-07-19 NOTE — PROGRESS NOTE ADULT - ASSESSMENT
82 y.o. M with PMH of thyroid ca s/p thyroidectomy in 2018, hyperthyroid, HTN, TIA (15-20 years ago), alzheimer's disease, and depression presented to [facility] ED due to L facial droop, L sided weakness and slurring of speech. LKN 22:00 hr on 7/15. Neuro exam fluctuating but remains with L hemiparesis and severe dysarthria. NIHSS 11 -> NIHSS 6 (does not know month, age, dysarthric, LUE drift, LUE ataxic). mRS 0. tPA administered 5.4 mg IV x1 @ 23:41-42 hr on 7/15. Bolus 49 mg started at 23:43  hr on 7/15. tPA administered > 30 min due to aggressive HTN management with IV medications. He was not a thrombectomy candidate due to no LVO    Impression: L hemiparesis and dysarthria due to acute ischemic stroke R MCA . Mechanism is unclear Embolic secondary to ESUS v. hypercoagulable secondary to COVID 19 +     NEURO: Agitated overnight, likely sundowning, Haldol 2mg IV administered. Continue close monitoring for neurologic deterioration, permissive HTN followed by gradual normotension, HgA1C 6.1 %, - continue high intensity statin,  Physical therapy/OT appreciated and recommended JESSICA. SLP gerda appreciated.     ANTITHROMBOTIC THERAPY: Transitioned to Eliquis given elevated D-Dimer (2086) hypercoagulable state secondary to Covid infection    PULMONARY: CXR clear, protecting airway, saturating well     CARDIOVASCULAR: check TTE with bubble, cardiac monitoring, started on Amlodipine 5 mg daily and gradually increase ( home meds Amlodipine 10 mg, Metoprolol ER 50mg daily)                               SBP goal: <140    GASTROINTESTINAL: passed dysphagia screen , aspiration precautions, s/p MBS on 7/18 with recommendations for minced and moist, tolerating well      Diet: Minced and moist     RENAL: BUN/Cr slightly elevated 38/1.27, s/p straight cath on 07/17 given limited urine output. Will continue to monitor closely.      Na Goal: Greater than 135     Rawls:    HEMATOLOGY:  H/H stable, anemia, Will likely need anticoagulation for 1 month due to elevated D-Dimer       DVT ppx: no need as pt is on Eliquis    ENDO: h/o thyroid CA s/p thyroidectomy, postsurgical hypothyroidism and will resume Levothyroxine 100 mcg daily    ID: Covid +, afebrile, no leukocytosis . ID following: CXR pending, if fever will send blood cultures    OTHER:  Depression, Dementia with sundowning at baseline.  Restarted home meds including Seroquel 25 mg q hs, Mirtazepine 7.5 mg qhs, Donepezil 10 mg q hs, Namenda 10 mg BID.      DISPOSITION: Subacute Rehab once stable and workup is complete    CORE MEASURES:        Admission NIHSS:      TPA: [x] YES [] NO      LDL/HDL: 108/61     Depression Screen:       Statin Therapy: Atorvastatin     Dysphagia Screen: [X] PASS [] FAIL     Smoking [] YES [] NO      Afib [] YES [] NO     Stroke Education [] YES [] NO    Obtain screening lower extremity venous ultrasound in patients who meet 1 or more of the following criteria as patient is high risk for DVT/PE on admission:   [] History of DVT/PE  []Hypercoagulable states (Factor V Leiden, Cancer, OCP, etc. )  []Prolonged immobility (hemiplegia/hemiparesis/post operative or any other extended immobilization)  [] Transferred from outside facility (Rehab or Long term care)  [] Age </= to 50   82 y.o. M with PMH of thyroid ca s/p thyroidectomy in 2018, hyperthyroid, HTN, TIA (15-20 years ago), alzheimer's disease, and depression presented to [facility] ED due to L facial droop, L sided weakness and slurring of speech. LKN 22:00 hr on 7/15. Neuro exam fluctuating but remains with L hemiparesis and severe dysarthria. NIHSS 11 -> NIHSS 6 (does not know month, age, dysarthric, LUE drift, LUE ataxic). mRS 0. tPA administered 5.4 mg IV x1 @ 23:41-42 hr on 7/15. Bolus 49 mg started at 23:43  hr on 7/15. tPA administered > 30 min due to aggressive HTN management with IV medications. He was not a thrombectomy candidate due to no LVO    Impression: L hemiparesis and dysarthria due to acute ischemic stroke R MCA . Mechanism is unclear, Embolic secondary to ESUS vs. hypercoagulable state secondary to COVID 19+     NEURO: Agitated overnight, likely sundowning, Haldol 2mg IV administered. Continue close monitoring for neurologic deterioration, permissive HTN followed by gradual normotension, HgA1C 6.1 %, - continue high intensity statin,  Physical therapy/OT appreciated and recommended JESSICA. SLP gerda appreciated.     ANTITHROMBOTIC THERAPY: Transitioned to Eliquis given elevated D-Dimer (2086) hypercoagulable state secondary to Covid infection    PULMONARY: Pending official report of CXR, protecting airway, saturating well     CARDIOVASCULAR: TTE shows EF70%, unremarkable, cardiac monitoring: no events, continue Amlodipine 5 mg daily and gradually increase ( home meds Amlodipine 10 mg, Metoprolol ER 50mg daily) for BP management                             SBP goal: <140    GASTROINTESTINAL: passed dysphagia screen , aspiration precautions, s/p MBS on 7/18 with recommendations for minced and moist, tolerating well      Diet: Minced and moist     RENAL: BUN/Cr slightly elevated 34/1.07 likely pre-renal, dehydration, will encourage PO fluid intake, s/p straight cath on 07/17 given limited urine output. Will continue to monitor closely.      Na Goal: Greater than 135     Rawls: No    HEMATOLOGY:  H/H stable, anemia, Will likely need anticoagulation for 1 month due to elevated D-Dimer. LE doppler ordered to r/o DVT       DVT ppx: no need as pt is on Eliquis    ENDO: h/o thyroid CA s/p thyroidectomy, postsurgical hypothyroidism: will continue Levothyroxine 100 mcg daily    ID: Covid +, afebrile, no leukocytosis . ID following: CXR pending, if fever will send blood cultures    OTHER:  Depression, Dementia with sundowning at baseline.  Restarted home meds including Seroquel 25 mg q hs, Mirtazepine 7.5 mg qhs, Donepezil 10 mg q hs, Namenda 10 mg BID. agitated on 07/18/22 vest and soft wrist restrained in place     DISPOSITION: Subacute Rehab as per PT/OT eval once stable and workup is complete    CORE MEASURES:        Admission NIHSS: 11     TPA: YES       LDL/HDL: 108/61     Depression Screen:  P     Statin Therapy: Atorvastatin     Dysphagia Screen: PASS     Smoking  NO      Afib  NO     Stroke Education YES    Obtain screening lower extremity venous ultrasound in patients who meet 1 or more of the following criteria as patient is high risk for DVT/PE on admission:   [] History of DVT/PE  []Hypercoagulable states (Factor V Leiden, Cancer, OCP, etc. )  []Prolonged immobility (hemiplegia/hemiparesis/post operative or any other extended immobilization)  [] Transferred from outside facility (Rehab or Long term care)  [] Age </= to 50   82 y.o. M with PMH of thyroid ca s/p thyroidectomy in 2018, hyperthyroid, HTN, TIA (15-20 years ago), alzheimer's disease, and depression presented to [facility] ED due to L facial droop, L sided weakness and slurring of speech. LKN 22:00 hr on 7/15. Neuro exam fluctuating but remains with L hemiparesis and severe dysarthria. NIHSS 11 -> NIHSS 6 (does not know month, age, dysarthric, LUE drift, LUE ataxic). mRS 0. tPA administered 5.4 mg IV x1 @ 23:41-42 hr on 7/15. Bolus 49 mg started at 23:43  hr on 7/15. tPA administered > 30 min due to aggressive HTN management with IV medications. He was not a thrombectomy candidate due to no LVO    Impression: L hemiparesis and dysarthria due to acute ischemic stroke R MCA . Mechanism is unclear, Embolic secondary to ESUS vs. hypercoagulable state secondary to COVID 19+     NEURO: Agitated overnight, likely sundowning, Haldol 2mg IV administered. Continue close monitoring for neurologic deterioration, permissive HTN followed by gradual normotension, HgA1C 6.1 %, - continue high intensity statin,  Physical therapy/OT appreciated and recommended JESSICA. SLP gerda appreciated.     ANTITHROMBOTIC THERAPY: Transitioned to Eliquis given elevated D-Dimer (2086) hypercoagulable state secondary to Covid infection    PULMONARY: Pending official report of CXR, protecting airway, saturating well     CARDIOVASCULAR: TTE shows EF70%, unremarkable, cardiac monitoring: no events, continue Amlodipine 5 mg daily and gradually increase ( home meds Amlodipine 10 mg, Metoprolol ER 50mg daily) for BP management                             SBP goal: <140    GASTROINTESTINAL: passed dysphagia screen , aspiration precautions, s/p MBS on 7/18 with recommendations for minced and moist, tolerating well      Diet: Minced and moist     RENAL: BUN/Cr slightly elevated 34/1.07 likely pre-renal, dehydration, will encourage PO fluid intake, s/p straight cath on 07/17 given limited urine output. Will continue to monitor closely.      Na Goal: Greater than 135     Rawls: No    HEMATOLOGY:  H/H stable, anemia, Will likely need anticoagulation for 1 month due to elevated D-Dimer. LE doppler ordered to r/o DVT       DVT ppx: no need as pt is on Eliquis    ENDO: h/o thyroid CA s/p thyroidectomy, postsurgical hypothyroidism: will continue Levothyroxine 100 mcg daily    ID: Covid +, afebrile, no leukocytosis . ID following: CXR pending, if fever will send blood cultures, Pt is on airborne/contact precautions    OTHER:  Depression, Dementia with sundowning at baseline.  Restarted home meds including Seroquel 25 mg q hs, Mirtazepine 7.5 mg qhs, Donepezil 10 mg q hs, Namenda 10 mg BID. agitated on 07/18/22 vest and soft wrist restrained in place     DISPOSITION: Subacute Rehab as per PT/OT eval once stable and workup is complete    CORE MEASURES:        Admission NIHSS: 11     TPA: YES       LDL/HDL: 108/61     Depression Screen:  P     Statin Therapy: Atorvastatin     Dysphagia Screen: PASS     Smoking  NO      Afib  NO     Stroke Education YES    Obtain screening lower extremity venous ultrasound in patients who meet 1 or more of the following criteria as patient is high risk for DVT/PE on admission:   [] History of DVT/PE  []Hypercoagulable states (Factor V Leiden, Cancer, OCP, etc. )  []Prolonged immobility (hemiplegia/hemiparesis/post operative or any other extended immobilization)  [] Transferred from outside facility (Rehab or Long term care)  [] Age </= to 50   82 y.o. M with PMH of thyroid ca s/p thyroidectomy in 2018, hyperthyroid, HTN, TIA (15-20 years ago), alzheimer's disease, and depression presented to [facility] ED due to L facial droop, L sided weakness and slurring of speech. LKN 22:00 hr on 7/15. Neuro exam fluctuating but remains with L hemiparesis and severe dysarthria. NIHSS 11 -> NIHSS 6 (does not know month, age, dysarthric, LUE drift, LUE ataxic). mRS 0. tPA administered 5.4 mg IV x1 @ 23:41-42 hr on 7/15. Bolus 49 mg started at 23:43  hr on 7/15. tPA administered > 30 min due to aggressive HTN management with IV medications. He was not a thrombectomy candidate due to no LVO    Impression: L hemiparesis and dysarthria due to acute ischemic stroke R MCA . Mechanism is unclear, Embolic secondary to ESUS vs. hypercoagulable state secondary to COVID 19+     NEURO: Agitated overnight, likely sundowning, Haldol 2mg IV administered. Continue close monitoring for neurologic deterioration, permissive HTN followed by gradual normotension, HgA1C 6.1 %, - continue high intensity statin,  Physical therapy/OT appreciated and recommended JESSICA. SLP gerda appreciated.     ANTITHROMBOTIC THERAPY: Transitioned to Eliquis given elevated D-Dimer (2086) hypercoagulable state secondary to Covid infection    PULMONARY: Pending official report of CXR, protecting airway, saturating well     CARDIOVASCULAR: TTE shows EF70%, unremarkable, cardiac monitoring: no events, will increase Amlodipine to 10 mg daily  ( will gradually start home meds : Metoprolol ER 50mg daily) for BP management                             SBP goal: 110-160mmHg    GASTROINTESTINAL: passed dysphagia screen , aspiration precautions, s/p MBS on 7/18 with recommendations for minced and moist, tolerating well      Diet: Minced and moist     RENAL: BUN/Cr slightly elevated 34/1.07 likely pre-renal, dehydration, will encourage PO fluid intake, s/p straight cath on 07/17 given limited urine output. Will continue to monitor closely.      Na Goal: Greater than 135     Rawls: No    HEMATOLOGY:  H/H stable, anemia, Will likely need anticoagulation for 1 month due to elevated D-Dimer. LE doppler ordered to r/o DVT       DVT ppx: no need as pt is on Eliquis    ENDO: h/o thyroid CA s/p thyroidectomy, postsurgical hypothyroidism: will continue Levothyroxine 100 mcg daily    ID: Covid +, afebrile, no leukocytosis . ID following: CXR pending, if fever will send blood cultures, Pt is on airborne/contact precautions    OTHER:  Depression, Dementia with sundowning at baseline.  Restarted home meds including Seroquel 25 mg q hs, Mirtazepine 7.5 mg qhs, Donepezil 10 mg q hs, Namenda 10 mg BID. Agitated on 07/18/22 vest and soft wrist restrained in place, D/C in am 07/19/22     DISPOSITION: Acute Rehab as per PT/OT eval once stable and workup is complete. PMR contacted    CORE MEASURES:        Admission NIHSS: 11     TPA: YES       LDL/HDL: 108/61     Depression Screen:  P     Statin Therapy: Atorvastatin     Dysphagia Screen: PASS     Smoking  NO      Afib  NO     Stroke Education YES    Obtain screening lower extremity venous ultrasound in patients who meet 1 or more of the following criteria as patient is high risk for DVT/PE on admission:   [] History of DVT/PE  []Hypercoagulable states (Factor V Leiden, Cancer, OCP, etc. )  []Prolonged immobility (hemiplegia/hemiparesis/post operative or any other extended immobilization)  [] Transferred from outside facility (Rehab or Long term care)  [] Age </= to 50   82 y.o. M with PMH of thyroid ca s/p thyroidectomy in 2018, hyperthyroid, HTN, TIA (15-20 years ago), alzheimer's disease, and depression presented to [facility] ED due to L facial droop, L sided weakness and slurring of speech. LKN 22:00 hr on 7/15. Neuro exam fluctuating but remains with L hemiparesis and severe dysarthria. NIHSS 11 -> NIHSS 6 (does not know month, age, dysarthric, LUE drift, LUE ataxic). mRS 0. tPA administered 5.4 mg IV x1 @ 23:41-42 hr on 7/15. Bolus 49 mg started at 23:43  hr on 7/15. tPA administered > 30 min due to aggressive HTN management with IV medications. He was not a thrombectomy candidate due to no LVO    Impression: L hemiparesis and dysarthria due to acute ischemic stroke R MCA . Mechanism is unclear, Embolic secondary to ESUS vs. hypercoagulable state secondary to COVID 19+     NEURO: Agitated overnight, likely sundowning, Haldol 2mg IV administered. Continue close monitoring for neurologic deterioration, permissive HTN followed by gradual normotension, HgA1C 6.1 %, - continue high intensity statin,  Physical therapy/OT appreciated and recommended JESSICA. SLP gerda appreciated.     ANTITHROMBOTIC THERAPY: Transitioned to Eliquis given elevated D-Dimer (2086) hypercoagulable state secondary to Covid infection    PULMONARY:  CXR (07/18): No focal infiltrates. No pleural effusion or pneumothorax, protecting airway, saturating well     CARDIOVASCULAR: TTE shows EF70%, unremarkable, cardiac monitoring: no events, will increase Amlodipine to 10 mg daily  ( will gradually start home meds : Metoprolol ER 50mg daily) for BP management                             SBP goal: 110-160mmHg    GASTROINTESTINAL: passed dysphagia screen , aspiration precautions, s/p MBS on 7/18 with recommendations for minced and moist, tolerating well      Diet: Minced and moist     RENAL: BUN/Cr slightly elevated 34/1.07 likely pre-renal, dehydration, will encourage PO fluid intake, s/p straight cath on 07/17 given limited urine output. Will continue to monitor closely.      Na Goal: Greater than 135     Rawls: No    HEMATOLOGY:  H/H stable, anemia, Will likely need anticoagulation for 1 month due to elevated D-Dimer. LE doppler ordered to r/o DVT       DVT ppx: no need as pt is on Eliquis    ENDO: h/o thyroid CA s/p thyroidectomy, postsurgical hypothyroidism: will continue Levothyroxine 100 mcg daily    ID: Covid +, afebrile, no leukocytosis . ID following: if fever will send blood cultures, Pt is on airborne/contact precautions    OTHER:  Depression, Dementia with sundowning at baseline.  Restarted home meds including Seroquel 25 mg q hs, Mirtazepine 7.5 mg qhs, Donepezil 10 mg q hs, Namenda 10 mg BID. Agitated on 07/18/22 vest and soft wrist restrained in place, D/C in am 07/19/22. Plan/goals  of care discussed with pt's daughter, Jordyn, over the phone     DISPOSITION: Acute Rehab as per PT/OT eval once stable and workup is complete. PMR contacted    CORE MEASURES:        Admission NIHSS: 11     TPA: YES       LDL/HDL: 108/61     Depression Screen:  P     Statin Therapy: Atorvastatin     Dysphagia Screen: PASS     Smoking  NO      Afib  NO     Stroke Education YES    Obtain screening lower extremity venous ultrasound in patients who meet 1 or more of the following criteria as patient is high risk for DVT/PE on admission:   [] History of DVT/PE  []Hypercoagulable states (Factor V Leiden, Cancer, OCP, etc. )  []Prolonged immobility (hemiplegia/hemiparesis/post operative or any other extended immobilization)  [] Transferred from outside facility (Rehab or Long term care)  [] Age </= to 50

## 2022-07-19 NOTE — PROGRESS NOTE ADULT - SUBJECTIVE AND OBJECTIVE BOX
THE PATIENT WAS SEEN AND EXAMINED BY ME WITH THE HOUSESTAFF AND STROKE TEAM DURING MORNING ROUNDS.   HPI:  82 y.o. M with PMH of thyroid ca s/p thyroidectomy in 2018, hyperthyroid, HTN, TIA (15-20 years ago), alzheimer's disease, and depression presented to [facility] ED due to L facial droop, L sided weakness and slurring of speech. LKN 22:00 hr on 7/15. Was asked by wife to take out trash and came back into the house ~22:15, started to have sx. Takes aspirin at home. Denies any toxic habits. No recent illnesses. Denies any headache, chest pain, numbness/tingling throughout the extremities. NIHSS 11 on admission. (He did not know month and age, L facial palsy, LUE some effort, LLE drift, mild aphasia, severe dysarthria), mRS 0.      SUBJECTIVE: agitated overnight.  No new neurologic complaints, ROS reported negative unless otherwise noted.      aluminum hydroxide/magnesium hydroxide/simethicone Suspension 30 milliLiter(s) Oral every 4 hours PRN  amLODIPine   Tablet 5 milliGRAM(s) Oral daily  apixaban 5 milliGRAM(s) Oral two times a day  atorvastatin 80 milliGRAM(s) Oral at bedtime  chlorhexidine 2% Cloths 1 Application(s) Topical daily  donepezil 10 milliGRAM(s) Oral at bedtime  levothyroxine 100 MICROGram(s) Oral daily  melatonin 5 milliGRAM(s) Oral at bedtime  memantine 10 milliGRAM(s) Oral two times a day  mirtazapine 7.5 milliGRAM(s) Oral at bedtime  QUEtiapine 25 milliGRAM(s) Oral at bedtime      PHYSICAL EXAM:   Vital Signs Last 24 Hrs  T(C): 36.7 (19 Jul 2022 06:43), Max: 37.3 (18 Jul 2022 19:48)  T(F): 98.1 (19 Jul 2022 06:43), Max: 99.2 (18 Jul 2022 19:48)  HR: 70 (19 Jul 2022 06:43) (65 - 103)  BP: 149/63 (19 Jul 2022 06:43) (142/62 - 179/78)  BP(mean): 110 (18 Jul 2022 14:00) (110 - 110)  RR: 18 (19 Jul 2022 06:43) (18 - 22)  SpO2: 96% (19 Jul 2022 06:43) (95% - 100%)    Parameters below as of 19 Jul 2022 06:43  Patient On (Oxygen Delivery Method): room air        General: No acute distress  HEENT: EOM intact, visual fields full  Abdomen: Soft, nontender, nondistended   Extremities: No edema    NEUROLOGICAL EXAM:  Mental status: Awake, alert, oriented to person only, follows simple commands inconsistently, mild aphasia,   Cranial Nerves: flattening of left NLF, no nystagmus, mod to severe dysarthria,  tongue midline  Motor exam: Normal tone, no drift, RUE 4/5, RLE 5/5, LUE 4/5, LLE 4/5,   Sensation: Intact to light touch   Coordination/ Gait: + dysmetria LUE, gait not assessed     LABS:                        12.0   6.19  )-----------( 116      ( 18 Jul 2022 14:08 )             37.2    07-18    138  |  103  |  34<H>  ----------------------------<  93  4.0   |  22  |  1.07    Ca    8.6      18 Jul 2022 06:22          IMAGING: Reviewed by me.     CT BRAIN 07.16.22 Developing acute infarct in the right middle cerebral artery vascular territory, involving the ventral aspect of the right frontoparietal convexity.  No hemorrhagic conversion.    CTA Neck and brain 07.15.22 No proximal large vessel intracranial occlusion. Atherosclerotic calcifications contributing to moderate luminal stenosis of the proximal left internal carotid artery by NASCET criteria.    CT Perfusion 07.15.22 No core infarct identified. Penumbra of 10 mL localized to the right MCA territory.    CT BRAIN 07.15.22 No acute intracranial hemorrhage, mass effect, or midline shift.  THE PATIENT WAS SEEN AND EXAMINED BY ME WITH THE HOUSESTAFF AND STROKE TEAM DURING MORNING ROUNDS.   HPI:  82 y.o. M with PMH of thyroid ca s/p thyroidectomy in 2018, hyperthyroid, HTN, TIA (15-20 years ago), alzheimer's disease, and depression presented to [facility] ED due to L facial droop, L sided weakness and slurring of speech. LKN 22:00 hr on 7/15. Was asked by wife to take out trash and came back into the house ~22:15, started to have sx. Takes aspirin at home. Denies any toxic habits. No recent illnesses. Denies any headache, chest pain, numbness/tingling throughout the extremities. NIHSS 11 on admission. (He did not know month and age, L facial palsy, LUE some effort, LLE drift, mild aphasia, severe dysarthria), mRS 0.      SUBJECTIVE: Agitated overnight.  No new neurologic complaints, ROS reported negative unless otherwise noted.      aluminum hydroxide/magnesium hydroxide/simethicone Suspension 30 milliLiter(s) Oral every 4 hours PRN  amLODIPine   Tablet 5 milliGRAM(s) Oral daily  apixaban 5 milliGRAM(s) Oral two times a day  atorvastatin 80 milliGRAM(s) Oral at bedtime  chlorhexidine 2% Cloths 1 Application(s) Topical daily  donepezil 10 milliGRAM(s) Oral at bedtime  levothyroxine 100 MICROGram(s) Oral daily  melatonin 5 milliGRAM(s) Oral at bedtime  memantine 10 milliGRAM(s) Oral two times a day  mirtazapine 7.5 milliGRAM(s) Oral at bedtime  QUEtiapine 25 milliGRAM(s) Oral at bedtime      PHYSICAL EXAM:   Vital Signs Last 24 Hrs  T(C): 36.7 (19 Jul 2022 06:43), Max: 37.3 (18 Jul 2022 19:48)  T(F): 98.1 (19 Jul 2022 06:43), Max: 99.2 (18 Jul 2022 19:48)  HR: 70 (19 Jul 2022 06:43) (65 - 103)  BP: 149/63 (19 Jul 2022 06:43) (142/62 - 179/78)  BP(mean): 110 (18 Jul 2022 14:00) (110 - 110)  RR: 18 (19 Jul 2022 06:43) (18 - 22)  SpO2: 96% (19 Jul 2022 06:43) (95% - 100%)    Parameters below as of 19 Jul 2022 06:43  Patient On (Oxygen Delivery Method): room air        General: No acute distress  HEENT: EOM intact, visual fields full  Abdomen: Soft, nontender, nondistended   Extremities: No edema    NEUROLOGICAL EXAM:  Mental status: Awake, alert, oriented to person only, follows simple commands inconsistently, mild aphasia,   Cranial Nerves: flattening of left NLF, no nystagmus, mod to severe dysarthria,  tongue midline  Motor exam: Normal tone, no drift, RUE 4/5, RLE 5/5, Left hemiparesis LUE 4/5, LLE 4/5,   Sensation: Intact to light touch   Coordination/ Gait: + dysmetria LUE, gait not assessed     LABS:                        12.0   6.19  )-----------( 116      ( 18 Jul 2022 14:08 )             37.2    07-18    138  |  103  |  34<H>  ----------------------------<  93  4.0   |  22  |  1.07    Ca    8.6      18 Jul 2022 06:22          IMAGING: Reviewed by me.     CT Brain 07.18.22: Right MCA infarct is again seen with some hemorrhagic transformation suspected.    CT BRAIN 07.16.22 Developing acute infarct in the right middle cerebral artery vascular territory, involving the ventral aspect of the right frontoparietal convexity.  No hemorrhagic conversion.    CTA Neck and brain 07.15.22 No proximal large vessel intracranial occlusion. Atherosclerotic calcifications contributing to moderate luminal stenosis of the proximal left internal carotid artery by NASCET criteria.    CT Perfusion 07.15.22 No core infarct identified. Penumbra of 10 mL localized to the right MCA territory.    CT BRAIN 07.15.22 No acute intracranial hemorrhage, mass effect, or midline shift.  THE PATIENT WAS SEEN AND EXAMINED BY ME WITH THE HOUSESTAFF AND STROKE TEAM DURING MORNING ROUNDS.   HPI:  82 y.o. M with PMH of thyroid ca s/p thyroidectomy in 2018, hyperthyroid, HTN, TIA (15-20 years ago), alzheimer's disease, and depression presented to [facility] ED due to L facial droop, L sided weakness and slurring of speech. LKN 22:00 hr on 7/15. Was asked by wife to take out trash and came back into the house ~22:15, started to have sx. Takes aspirin at home. Denies any toxic habits. No recent illnesses. Denies any headache, chest pain, numbness/tingling throughout the extremities. NIHSS 11 on admission. (He did not know month and age, L facial palsy, LUE some effort, LLE drift, mild aphasia, severe dysarthria), mRS 0.      SUBJECTIVE: Agitated overnight.  No new neurologic complaints, ROS reported negative unless otherwise noted.      aluminum hydroxide/magnesium hydroxide/simethicone Suspension 30 milliLiter(s) Oral every 4 hours PRN  amLODIPine   Tablet 5 milliGRAM(s) Oral daily  apixaban 5 milliGRAM(s) Oral two times a day  atorvastatin 80 milliGRAM(s) Oral at bedtime  chlorhexidine 2% Cloths 1 Application(s) Topical daily  donepezil 10 milliGRAM(s) Oral at bedtime  levothyroxine 100 MICROGram(s) Oral daily  melatonin 5 milliGRAM(s) Oral at bedtime  memantine 10 milliGRAM(s) Oral two times a day  mirtazapine 7.5 milliGRAM(s) Oral at bedtime  QUEtiapine 25 milliGRAM(s) Oral at bedtime      PHYSICAL EXAM:   Vital Signs Last 24 Hrs  T(C): 36.7 (19 Jul 2022 06:43), Max: 37.3 (18 Jul 2022 19:48)  T(F): 98.1 (19 Jul 2022 06:43), Max: 99.2 (18 Jul 2022 19:48)  HR: 70 (19 Jul 2022 06:43) (65 - 103)  BP: 149/63 (19 Jul 2022 06:43) (142/62 - 179/78)  BP(mean): 110 (18 Jul 2022 14:00) (110 - 110)  RR: 18 (19 Jul 2022 06:43) (18 - 22)  SpO2: 96% (19 Jul 2022 06:43) (95% - 100%)    Parameters below as of 19 Jul 2022 06:43  Patient On (Oxygen Delivery Method): room air        General: No acute distress  HEENT: EOM intact, visual fields full  Abdomen: Soft, nontender, nondistended   Extremities: No edema    NEUROLOGICAL EXAM:  Mental status: Eyes open, awake, alert, oriented to person only, follows simple commands inconsistently, mild aphasia   Cranial Nerves: flattening of left NLF, no nystagmus, mod to severe dysarthria,  tongue midline  Motor exam: Normal tone, no drift, RUE 4/5, RLE 5/5, Left hemiparesis LUE 4/5, LLE 4/5,   Sensation: Intact to light touch   Coordination/ Gait: + dysmetria LUE, gait not assessed     LABS:                        12.0   6.19  )-----------( 116      ( 18 Jul 2022 14:08 )             37.2    07-18    138  |  103  |  34<H>  ----------------------------<  93  4.0   |  22  |  1.07    Ca    8.6      18 Jul 2022 06:22          IMAGING: Reviewed by me.     CT Brain 07.18.22: Right MCA infarct is again seen with some hemorrhagic transformation suspected.    CT BRAIN 07.16.22 Developing acute infarct in the right middle cerebral artery vascular territory, involving the ventral aspect of the right frontoparietal convexity.  No hemorrhagic conversion.    CTA Neck and brain 07.15.22 No proximal large vessel intracranial occlusion. Atherosclerotic calcifications contributing to moderate luminal stenosis of the proximal left internal carotid artery by NASCET criteria.    CT Perfusion 07.15.22 No core infarct identified. Penumbra of 10 mL localized to the right MCA territory.    CT BRAIN 07.15.22 No acute intracranial hemorrhage, mass effect, or midline shift.

## 2022-07-19 NOTE — PROGRESS NOTE ADULT - ASSESSMENT
Patient is a 82y male with a past history of thyroid cancer/thyroidectomy,  dementia, HTN, who was admitted on 7/15 when he developed left facial droop and left sided weakness and speech difficulty.  He was brought to ER , felt to likely have a Rt MCA CVA, and he received TPA.CT and MRI confirm this.  He has been stable, tested positive for Covid, has not had any fever or respiratory difficulty.  He is alert, has dysarthric speech, dementia limits history.It is unclear if he has been vaccinated.  He appears to have asymptomatic Covid, he is not coughing, there is no report of any fever, and his O2 sats are above 95% on RA.  As per chart, his neurological symptoms were acute, no report of any signs of recent respiratory infections.  His CXR looks clear to me.  Elevated D dimer noted, he certainly could have a degree of hypercoagulability from Covid as a cause of CVA  He does not appear to have any active respiratory symptoms, therefor hard to time onset of exposure/infection.  Suggest:  1.Anticoagulation per neurology  2.supportive care   3.I think we can hold off on antivirals such as RDV, not clear if it would provide any benefit  4.blood culture any fever spikes  5.check official  CXR report

## 2022-07-20 DIAGNOSIS — I63.9 CEREBRAL INFARCTION, UNSPECIFIED: ICD-10-CM

## 2022-07-20 DIAGNOSIS — I10 ESSENTIAL (PRIMARY) HYPERTENSION: ICD-10-CM

## 2022-07-20 DIAGNOSIS — U07.1 COVID-19: ICD-10-CM

## 2022-07-20 PROCEDURE — 99222 1ST HOSP IP/OBS MODERATE 55: CPT

## 2022-07-20 PROCEDURE — 99233 SBSQ HOSP IP/OBS HIGH 50: CPT | Mod: FS

## 2022-07-20 PROCEDURE — 99232 SBSQ HOSP IP/OBS MODERATE 35: CPT

## 2022-07-20 PROCEDURE — 93010 ELECTROCARDIOGRAM REPORT: CPT

## 2022-07-20 RX ORDER — QUETIAPINE FUMARATE 200 MG/1
12.5 TABLET, FILM COATED ORAL
Refills: 0 | Status: DISCONTINUED | OUTPATIENT
Start: 2022-07-20 | End: 2022-07-24

## 2022-07-20 RX ORDER — ACETAMINOPHEN 500 MG
1000 TABLET ORAL ONCE
Refills: 0 | Status: COMPLETED | OUTPATIENT
Start: 2022-07-20 | End: 2022-07-20

## 2022-07-20 RX ORDER — LABETALOL HCL 100 MG
5 TABLET ORAL ONCE
Refills: 0 | Status: COMPLETED | OUTPATIENT
Start: 2022-07-20 | End: 2022-07-20

## 2022-07-20 RX ORDER — HALOPERIDOL DECANOATE 100 MG/ML
2 INJECTION INTRAMUSCULAR EVERY 12 HOURS
Refills: 0 | Status: DISCONTINUED | OUTPATIENT
Start: 2022-07-20 | End: 2022-07-27

## 2022-07-20 RX ORDER — DIPHENHYDRAMINE HCL 50 MG
25 CAPSULE ORAL ONCE
Refills: 0 | Status: COMPLETED | OUTPATIENT
Start: 2022-07-20 | End: 2022-07-20

## 2022-07-20 RX ORDER — METOPROLOL TARTRATE 50 MG
50 TABLET ORAL DAILY
Refills: 0 | Status: DISCONTINUED | OUTPATIENT
Start: 2022-07-21 | End: 2022-07-27

## 2022-07-20 RX ADMIN — ATORVASTATIN CALCIUM 80 MILLIGRAM(S): 80 TABLET, FILM COATED ORAL at 21:11

## 2022-07-20 RX ADMIN — HALOPERIDOL DECANOATE 2 MILLIGRAM(S): 100 INJECTION INTRAMUSCULAR at 21:12

## 2022-07-20 RX ADMIN — DONEPEZIL HYDROCHLORIDE 10 MILLIGRAM(S): 10 TABLET, FILM COATED ORAL at 21:11

## 2022-07-20 RX ADMIN — APIXABAN 5 MILLIGRAM(S): 2.5 TABLET, FILM COATED ORAL at 17:15

## 2022-07-20 RX ADMIN — Medication 650 MILLIGRAM(S): at 23:38

## 2022-07-20 RX ADMIN — AMLODIPINE BESYLATE 10 MILLIGRAM(S): 2.5 TABLET ORAL at 06:30

## 2022-07-20 RX ADMIN — Medication 25 MILLIGRAM(S): at 02:00

## 2022-07-20 RX ADMIN — QUETIAPINE FUMARATE 25 MILLIGRAM(S): 200 TABLET, FILM COATED ORAL at 21:12

## 2022-07-20 RX ADMIN — Medication 5 MILLIGRAM(S): at 21:11

## 2022-07-20 RX ADMIN — Medication 5 MILLIGRAM(S): at 16:00

## 2022-07-20 RX ADMIN — MIRTAZAPINE 7.5 MILLIGRAM(S): 45 TABLET, ORALLY DISINTEGRATING ORAL at 21:12

## 2022-07-20 RX ADMIN — Medication 25 MILLIGRAM(S): at 06:26

## 2022-07-20 RX ADMIN — MEMANTINE HYDROCHLORIDE 10 MILLIGRAM(S): 10 TABLET ORAL at 06:25

## 2022-07-20 RX ADMIN — APIXABAN 5 MILLIGRAM(S): 2.5 TABLET, FILM COATED ORAL at 06:26

## 2022-07-20 RX ADMIN — Medication 100 MICROGRAM(S): at 06:26

## 2022-07-20 RX ADMIN — Medication 400 MILLIGRAM(S): at 02:01

## 2022-07-20 RX ADMIN — Medication 1000 MILLIGRAM(S): at 02:31

## 2022-07-20 RX ADMIN — MEMANTINE HYDROCHLORIDE 10 MILLIGRAM(S): 10 TABLET ORAL at 17:15

## 2022-07-20 NOTE — BH CONSULTATION LIAISON ASSESSMENT NOTE - NSBHCONSULTMEDAGITATION_PSY_A_CORE FT
Check EKG, hold if QTc >500: PRN: Seroquel 12.5mg PO BID, prn for agitation, if uncooperative, Haldol 2mg IM/IV prn for agitation Check EKG, hold if QTc >500: PRN: Seroquel 12.5mg PO BID, prn for agitation, if uncooperative, Haldol 2mg IM/IV q8h prn for agitation

## 2022-07-20 NOTE — PROGRESS NOTE ADULT - SUBJECTIVE AND OBJECTIVE BOX
CC: f/u for Covid    Patient reports: he is on 1:1, less agitated, speech less garbled    REVIEW OF SYSTEMS:  All other review of systems negative (Comprehensive ROS)    Antimicrobials Day #  :off    Other Medications Reviewed  MEDICATIONS  (STANDING):  amLODIPine   Tablet 10 milliGRAM(s) Oral daily  apixaban 5 milliGRAM(s) Oral two times a day  atorvastatin 80 milliGRAM(s) Oral at bedtime  chlorhexidine 2% Cloths 1 Application(s) Topical daily  donepezil 10 milliGRAM(s) Oral at bedtime  levothyroxine 100 MICROGram(s) Oral daily  melatonin 5 milliGRAM(s) Oral <User Schedule>  memantine 10 milliGRAM(s) Oral two times a day  metoprolol succinate ER 25 milliGRAM(s) Oral daily  mirtazapine 7.5 milliGRAM(s) Oral at bedtime  QUEtiapine 25 milliGRAM(s) Oral at bedtime    T(F): 98.3 (07-20-22 @ 09:44), Max: 98.6 (07-19-22 @ 17:44)  HR: 65 (07-20-22 @ 09:44)  BP: 168/70 (07-20-22 @ 09:44)  RR: 18 (07-20-22 @ 09:44)  SpO2: 98% (07-20-22 @ 09:44)  Wt(kg): --    PHYSICAL EXAM:  General: alert, no acute distress  Eyes:  anicteric, no conjunctival injection, no discharge  Oropharynx: no lesions or injection 	  Neck: supple, without adenopathy  Lungs: clear to auscultation  Heart: regular rate and rhythm; no murmur, rubs or gallops  Abdomen: soft, nondistended, nontender, without mass or organomegaly  Skin: no lesions  Extremities: no clubbing, cyanosis, or edema  Neurologic: alert, oriented, moves all extremities, speech less dysarthric    LAB RESULTS:                        12.0   6.19  )-----------( 116      ( 18 Jul 2022 14:08 )             37.2               MICROBIOLOGY:  RECENT CULTURES:      RADIOLOGY REVIEWED:  < from: VA Duplex Lower Ext Vein Scan, Bilat (07.19.22 @ 14:06) >  IMPRESSION:  No evidence of deep venous thrombosis in either lower extremity.      < end of copied text >  < from: Xray Chest 1 View- PORTABLE-Routine (Xray Chest 1 View- PORTABLE-Routine .) (07.18.22 @ 15:04) >  Impression:  Negative for acute cardiopulmonary process.      < end of copied text >

## 2022-07-20 NOTE — BH CONSULTATION LIAISON ASSESSMENT NOTE - NSBHCHARTREVIEWINVESTIGATE_PSY_A_CORE FT
Ventricular Rate 64 BPM    Atrial Rate 64 BPM    P-R Interval 210 ms    QRS Duration 114 ms    Q-T Interval 432 ms    QTC Calculation(Bazett) 445 ms    P Axis 35 degrees    R Axis -47 degrees    T Axis -1 degrees    Diagnosis Line SINUS RHYTHM [id number] DEGREE A-V BLOCK  LEFT ANTERIOR FASCICULAR BLOCK  MODERATE VOLTAGE CRITERIA FOR LVH, MAY BE NORMAL VARIANT ( R in aVL , [place] product )  ANTERIOR INFARCT , AGE UNDETERMINED  ABNORMAL ECG  WHEN COMPARED WITH ECG OF 20-OCT-2015 17:41,  SIGNIFICANT CHANGES HAVE OCCURRED  Confirmed by MD Stephany, [facility] ([id number]) on 7/16/2022 8:41:16 PM

## 2022-07-20 NOTE — CONSULT NOTE ADULT - PROBLEM SELECTOR RECOMMENDATION 9
acute ischemic stroke R MCA    - Mechanism is unclear, Embolic secondary to ESUS vs. hypercoagulable state secondary to COVID 19+   sp tPA   Eliquis/Statin  neuro checks  TTE - EF 70%, minimal AR   monitor on tele  orders per Stroke Team  PT/OT

## 2022-07-20 NOTE — CHART NOTE - NSCHARTNOTEFT_GEN_A_CORE
Pt is a 82 y.o. M with PMH of thyroid ca s/p thyroidectomy in 2018, hyperthyroid, HTN, TIA (15-20 years ago), alzheimer's disease, and depression presented to [facility] ED due to L facial droop, L sided weakness and slurring of speech. Pt with limited ROM of lingual and labial musculature and reduced L sided sensation. Neuro Impression: L hemiparesis and dysarthria likely due to R MCA acute ischemic stroke.    7/16 Bedside swallow evaluation completed. Rx for puree with moderately thick liquids and objective testing  7/18 s/p MBS with rx for minced and moist solids (due to inefficient mastication 2/2 edentulous status) with single cup sip of thin liquids. Pt p/w 1. an oropharyngeal dysphagia marked by poor bolus formation, lateral loss of bolus, prolonged mastication and delayed oral transit time (with hard solids most likely related to edentulous state), uncontrolled loss, and deep silent laryngeal penetration of thin liquids during consecutive cup sips. 2. Dysarthria. 3. ? Aphasia. Rx for upgrade at bedside with dentures in place given no airway invasion (penetration/aspiration) with regular solids.    TODAY, seen for diet upgrade at bedside +dentures. Pt seen with regular solids and thin liquids. Pt with functional and efficient mastication, functional oral transit time, and timely initiation of swallow. No overt s/s of aspiration. Pt expressed understanding of safe swallow strategies (eating upright, small bites and drinks at a slow rate).     IMPRESSION: Pt p/w an oropharyngeal swallow functional for regular solids and thin liquids.    RECOMMENDATIONS:  >Regular solids with thin liquids  >Maintain aspiration precautions  >Maintain oral hygiene  >D/C rehab    D/W MD León; NIRAJ Kyle CCC-SLP *Teams* ([id number] or [phone])

## 2022-07-20 NOTE — PROGRESS NOTE ADULT - ASSESSMENT
82 y.o. M with PMH of thyroid ca s/p thyroidectomy in 2018, hyperthyroid, HTN, TIA (15-20 years ago), alzheimer's disease, and depression presented to [facility] ED due to L facial droop, L sided weakness and slurring of speech. LKN 22:00 hr on 7/15. Neuro exam fluctuating but remains with L hemiparesis and severe dysarthria. NIHSS 11 -> NIHSS 6 (does not know month, age, dysarthric, LUE drift, LUE ataxic). mRS 0. tPA administered 5.4 mg IV x1 @ 23:41-42 hr on 7/15. Bolus 49 mg started at 23:43  hr on 7/15. tPA administered > 30 min due to aggressive HTN management with IV medications. He was not a thrombectomy candidate due to no LVO    Impression: L hemiparesis and dysarthria due to acute ischemic stroke R MCA . Mechanism is unclear, Embolic secondary to ESUS vs. hypercoagulable state secondary to COVID 19+     NEURO: Agitated overnight, likely sundowning, Haldol 2mg IV administered. Continue close monitoring for neurologic deterioration, permissive HTN followed by gradual normotension, HgA1C 6.1 %, - continue high intensity statin,  Physical therapy/OT appreciated and recommended AR. SLP eval appreciated.     ANTITHROMBOTIC THERAPY: Transitioned to Eliquis given elevated D-Dimer (2086) hypercoagulable state secondary to Covid infection    PULMONARY:  CXR (07/18): No focal infiltrates. No pleural effusion or pneumothorax, protecting airway, saturating well     CARDIOVASCULAR: TTE shows EF70%, unremarkable, cardiac monitoring: no events, will increase Amlodipine to 10 mg daily  ( will gradually start home meds : Metoprolol ER 50mg daily) for BP management                             SBP goal: 110-160mmHg    GASTROINTESTINAL: passed dysphagia screen , aspiration precautions, s/p MBS on 7/18 with recommendations for minced and moist, tolerating well      Diet: Minced and moist     RENAL: BUN/Cr slightly elevated 34/1.07 likely pre-renal, dehydration, will encourage PO fluid intake, s/p straight cath on 07/17 given limited urine output. Will continue to monitor closely.      Na Goal: Greater than 135     Rawls: No    HEMATOLOGY:  H/H stable, anemia, Will likely need anticoagulation for 1 month due to elevated D-Dimer. LE doppler: negative for DVT     DVT ppx: no need as pt is on Eliquis    ENDO: h/o thyroid CA s/p thyroidectomy, postsurgical hypothyroidism: will continue Levothyroxine 100 mcg daily    ID: Covid +, afebrile, no leukocytosis . ID following: if fever will send blood cultures, Pt is on airborne/contact precautions, no indication for antivirals.    OTHER:  Depression, Dementia with sundowning at baseline.  Restarted home meds including Seroquel 25 mg q hs, Mirtazepine 7.5 mg qhs, Donepezil 10 mg q hs, Namenda 10 mg BID. Agitated on 07/19/22 vest and soft wrist restrained in place, Plan/goals  of care discussed with pt's daughter, Jordyn, over the phone     DISPOSITION: Acute Rehab as per PT/OT eval once stable and workup is complete. PMR contacted    CORE MEASURES:        Admission NIHSS: 11     TPA: YES       LDL/HDL: 108/61     Depression Screen:  P     Statin Therapy: Atorvastatin     Dysphagia Screen: PASS     Smoking  NO      Afib  NO     Stroke Education YES    Obtain screening lower extremity venous ultrasound in patients who meet 1 or more of the following criteria as patient is high risk for DVT/PE on admission:   [] History of DVT/PE  []Hypercoagulable states (Factor V Leiden, Cancer, OCP, etc. )  []Prolonged immobility (hemiplegia/hemiparesis/post operative or any other extended immobilization)  [] Transferred from outside facility (Rehab or Long term care)  [] Age </= to 50 82 y.o. M with PMH of thyroid ca s/p thyroidectomy in 2018, hyperthyroid, HTN, TIA (15-20 years ago), alzheimer's disease, and depression presented to [facility] ED due to L facial droop, L sided weakness and slurring of speech. LKN 22:00 hr on 7/15. Neuro exam fluctuating but remains with L hemiparesis and severe dysarthria. NIHSS 11 -> NIHSS 6 (does not know month, age, dysarthric, LUE drift, LUE ataxic). mRS 0. tPA administered 5.4 mg IV x1 @ 23:41-42 hr on 7/15. Bolus 49 mg started at 23:43  hr on 7/15. tPA administered > 30 min due to aggressive HTN management with IV medications. He was not a thrombectomy candidate due to no LVO    Impression: L hemiparesis and dysarthria due to acute ischemic stroke R MCA . Mechanism is unclear, Embolic secondary to ESUS vs. hypercoagulable state secondary to COVID 19+     NEURO: Agitated overnight, likely sundowning, Haldol 2mg IV administered. Continue close monitoring for neurologic deterioration, permissive HTN followed by gradual normotension, HgA1C 6.1 %, - continue high intensity statin,  Physical therapy/OT appreciated and recommended AR. SLP eval appreciated.     ANTITHROMBOTIC THERAPY: Transitioned to Eliquis given elevated D-Dimer (2086) hypercoagulable state secondary to Covid infection    PULMONARY:  CXR (07/18): No focal infiltrates. No pleural effusion or pneumothorax, protecting airway, saturating well     CARDIOVASCULAR: TTE shows EF70%, unremarkable, cardiac monitoring: no events, will increase Amlodipine to 10 mg daily  ( will gradually start home meds : Metoprolol ER 50mg daily) for BP management                             SBP goal: 110-160mmHg    GASTROINTESTINAL: passed dysphagia screen , aspiration precautions, s/p MBS on 7/18 with recommendations for minced and moist, tolerating well      Diet: Minced and moist     RENAL: BUN/Cr slightly elevated 34/1.07 likely pre-renal, dehydration, will encourage PO fluid intake, s/p straight cath on 07/17 given limited urine output. Will continue to monitor closely.      Na Goal: Greater than 135     Rawls: No    HEMATOLOGY:  H/H stable, anemia, Will likely need anticoagulation for 1 month due to elevated D-Dimer. LE doppler: negative for DVT     DVT ppx: no need as pt is on Eliquis    ENDO: h/o thyroid CA s/p thyroidectomy, postsurgical hypothyroidism: will continue Levothyroxine 100 mcg daily    ID: Covid +, afebrile, no leukocytosis . ID following: if fever will send blood cultures, Pt is on airborne/contact precautions, no indication for antivirals.    OTHER:  Depression, Dementia with sundowning at baseline.  Restarted home meds including Seroquel 25 mg q hs, Mirtazepine 7.5 mg qhs, Donepezil 10 mg q hs, Namenda 10 mg BID. Agitated on 07/19/22 vest and soft wrist restrained in place: psych consult appreciated, Plan/goals  of care discussed with pt's daughter, Jordyn, over the phone     DISPOSITION: Acute Rehab once completed covid quarentine    CORE MEASURES:        Admission NIHSS: 11     TPA: YES       LDL/HDL: 108/61     Depression Screen:  P     Statin Therapy: Atorvastatin     Dysphagia Screen: PASS     Smoking  NO      Afib  NO     Stroke Education YES    Obtain screening lower extremity venous ultrasound in patients who meet 1 or more of the following criteria as patient is high risk for DVT/PE on admission:   [] History of DVT/PE  []Hypercoagulable states (Factor V Leiden, Cancer, OCP, etc. )  []Prolonged immobility (hemiplegia/hemiparesis/post operative or any other extended immobilization)  [] Transferred from outside facility (Rehab or Long term care)  [] Age </= to 50

## 2022-07-20 NOTE — PROGRESS NOTE ADULT - SUBJECTIVE AND OBJECTIVE BOX
THE PATIENT WAS SEEN AND EXAMINED BY ME WITH THE HOUSESTAFF AND STROKE TEAM DURING MORNING ROUNDS.   HPI:  82 y.o. M with PMH of thyroid ca s/p thyroidectomy in 2018, hyperthyroid, HTN, TIA (15-20 years ago), alzheimer's disease, and depression presented to [facility] ED due to L facial droop, L sided weakness and slurring of speech. LKN 22:00 hr on 7/15. Was asked by wife to take out trash and came back into the house ~22:15, started to have sx. Takes aspirin at home. Denies any toxic habits. No recent illnesses. Denies any headache, chest pain, numbness/tingling throughout the extremities. NIHSS 11 on admission. (He did not know month and age, L facial palsy, LUE some effort, LLE drift, mild aphasia, severe dysarthria), mRS 0.      SUBJECTIVE: Agitated overnight.  No new neurologic complaints.      acetaminophen     Tablet .. 650 milliGRAM(s) Oral every 6 hours PRN  aluminum hydroxide/magnesium hydroxide/simethicone Suspension 30 milliLiter(s) Oral every 4 hours PRN  amLODIPine   Tablet 10 milliGRAM(s) Oral daily  apixaban 5 milliGRAM(s) Oral two times a day  atorvastatin 80 milliGRAM(s) Oral at bedtime  chlorhexidine 2% Cloths 1 Application(s) Topical daily  donepezil 10 milliGRAM(s) Oral at bedtime  levothyroxine 100 MICROGram(s) Oral daily  melatonin 5 milliGRAM(s) Oral <User Schedule>  memantine 10 milliGRAM(s) Oral two times a day  metoprolol succinate ER 25 milliGRAM(s) Oral daily  mirtazapine 7.5 milliGRAM(s) Oral at bedtime  QUEtiapine 25 milliGRAM(s) Oral at bedtime      PHYSICAL EXAM:   Vital Signs Last 24 Hrs  T(C): 37 (20 Jul 2022 05:58), Max: 37 (19 Jul 2022 17:44)  T(F): 98.6 (20 Jul 2022 05:58), Max: 98.6 (19 Jul 2022 17:44)  HR: 57 (20 Jul 2022 05:58) (57 - 94)  BP: 168/70 (20 Jul 2022 05:58) (165/69 - 198/69)  RR: 18 (20 Jul 2022 05:58) (18 - 18)  SpO2: 98% (20 Jul 2022 05:58) (95% - 98%)      General: No acute distress  HEENT: EOM intact, visual fields full  Abdomen: Soft, nontender, nondistended   Extremities: No edema    NEUROLOGICAL EXAM:  Mental status: Eyes open, awake, alert, oriented to person only, follows simple commands inconsistently, mild aphasia   Cranial Nerves: flattening of left NLF, no nystagmus, mod to severe dysarthria,  tongue midline  Motor exam: Normal tone, no drift, RUE 4/5, RLE 5/5, Left hemiparesis LUE 4/5, LLE 4/5,   Sensation: Intact to light touch   Coordination/ Gait: + dysmetria LUE, gait unsteady    LABS:                        12.0   6.19  )-----------( 116      ( 18 Jul 2022 14:08 )             37.2              IMAGING: Reviewed by me.     CT Brain 07.18.22: Right MCA infarct is again seen with some hemorrhagic transformation suspected.    CT BRAIN 07.16.22 Developing acute infarct in the right middle cerebral artery vascular territory, involving the ventral aspect of the right frontoparietal convexity.  No hemorrhagic conversion.    CTA Neck and brain 07.15.22 No proximal large vessel intracranial occlusion. Atherosclerotic calcifications contributing to moderate luminal stenosis of the proximal left internal carotid artery by NASCET criteria.    CT Perfusion 07.15.22 No core infarct identified. Penumbra of 10 mL localized to the right MCA territory.    CT BRAIN 07.15.22 No acute intracranial hemorrhage, mass effect, or midline shift.

## 2022-07-20 NOTE — BH CONSULTATION LIAISON ASSESSMENT NOTE - HPI (INCLUDE ILLNESS QUALITY, SEVERITY, DURATION, TIMING, CONTEXT, MODIFYING FACTORS, ASSOCIATED SIGNS AND SYMPTOMS)
Patient is a 82 y.o. male with a PMH of thyroid ca s/p thyroidectomy in 2018, hyperthyroid, HTN, TIA (15-20 years ago), alzheimer's disease and PPHx of depression who was admitted for a R MCA infarct.  Psych was consulted for agitation.    Patient was seen and examined at bedside. Patient is calm and cooperative at the time of the interview. Patient is AAOx4. On exam, patient exhibiting expressive aphasia 2/2 to stroke and thus focused interview was limited. Patient denies SIIP, HIIP, and AVH. Patient denies recent sleep disturbances or changes in his mood. Endorses psychiatric hx of depression.     Collateral collected from daughter, Evelynl. Patient lives with wife in private home. Sister, who lives nearby, visits patient regularly. Daughter states patient has been dementia has progressively worsened in the last 3 months. She denies patient is agitated or combative at baseline. As per daughter, patient does become easily confused when his routine is changed. States "he will look for his watch, clothes, phone." States patient is able to perform most iADLs and ADLs, independently. Patient currently lives in [place] and returned to [place] with his wife, 2 weeks ago, in order to sell their home. Patient prescribed Seroquel 25mg by his geriatrician in [place]. Daughter states that she is unsure if patient has taken Seroquel nightly dose in the last month. Patient also follows with psychiatrist in [place], daughter unsure of BH provider's name. Endorses previous psychiatric hx of depression. Patient was voluntary hospitalized for severe depression w/o SIIP/HIIP in 2012/2013 at [facility] for 4 days. Family recommended patient be hospitalized due to anhedonia and significant weight loss. Patient was also placed on Zoloft at the time, unsure of duration of treatment, but has not been on psychotropic medication since then. Patient has had no additional psychiatric hospitalizations. Daughter also endorses hx of trauma during his childhood while in [place].  States patient sometimes has flashbacks of memories from war in [place]. Denies previous suicide attempts or substance use. Denies patient has expressed SIIP, HIIP, or AVH.     Daughter describes patient as future-oriented. States patient has been concerned about tamar an infection while hospitalized and is looking forward to going home to grandchildren and his pet.

## 2022-07-20 NOTE — BH CONSULTATION LIAISON ASSESSMENT NOTE - NSBHCHARTREVIEWVS_PSY_A_CORE FT
Vital Signs Last 24 Hrs  T(C): 37 (20 Jul 2022 05:58), Max: 37 (19 Jul 2022 17:44)  T(F): 98.6 (20 Jul 2022 05:58), Max: 98.6 (19 Jul 2022 17:44)  HR: 57 (20 Jul 2022 05:58) (57 - 94)  BP: 168/70 (20 Jul 2022 05:58) (165/69 - 198/69)  BP(mean): --  RR: 18 (20 Jul 2022 05:58) (18 - 18)  SpO2: 98% (20 Jul 2022 05:58) (95% - 98%)    Parameters below as of 20 Jul 2022 05:58  Patient On (Oxygen Delivery Method): room air

## 2022-07-20 NOTE — BH CONSULTATION LIAISON ASSESSMENT NOTE - NSBHCONSULTRECOMMENDOTHER_PSY_A_CORE FT
Continue Seroquel 25mg qhs for sleep  Continue Seroquel 25mg qhs for sleep and mood stability, f/u QTc < 500

## 2022-07-20 NOTE — BH CONSULTATION LIAISON ASSESSMENT NOTE - RISK ASSESSMENT
At this time, patient is not at high imminent risk and thus is not appropriate for inpatient psychiatric care. Risk factors include patient's acute medical condition and mood disorder. Protective factors include strong support system and positive therapeutic relationships.

## 2022-07-20 NOTE — BH CONSULTATION LIAISON ASSESSMENT NOTE - EMPLOYMENT
Patient has been experiencing dysphagia for approximately 3 years. Multiple EGDs done and esophageal motility were all inconclusive. Patient was seen in office with son present on 9/22/2017. Dr. Moncada discussed with both the patient and the son performing another EGD with savory dilators. They all agreed that she will not have any further workup or procedures done.     Patient now calling again for dysphagia.     Routed to Dr. Moncada. Please advise.    Retired

## 2022-07-20 NOTE — CONSULT NOTE ADULT - SUBJECTIVE AND OBJECTIVE BOX
CHIEF COMPLAINT: Stroke    HISTORY OF PRESENT ILLNESS: 82 y.o. M with PMH of thyroid ca s/p thyroidectomy in 2018, hyperthyroid, HTN, TIA (15-20 years ago), alzheimer's disease, and depression presented to [facility] ED due to L facial droop, L sided weakness and slurring of speech. LKN 22:00 hr on 7/15. Was asked by wife to take out trash and came back into the house ~22:15, started to have sx. Takes aspirin at home. Denies any toxic habits. No recent illnesses. Denies any headache, chest pain, numbness/tingling throughout the extremities.     Cardiology consulted for BP control.  Unable to obtain information from pt, A&Ox1 on exam.    Pt was communicative but difficult to understand, was able to follow commands.     PAST MEDICAL & SURGICAL HISTORY:  Depressive disorder, not elsewhere classified    Hypertension    CVA (cerebral vascular accident)    No significant past surgical history    MEDICATIONS:  amLODIPine   Tablet 10 milliGRAM(s) Oral daily  apixaban 5 milliGRAM(s) Oral two times a day  metoprolol succinate ER 25 milliGRAM(s) Oral daily    acetaminophen     Tablet .. 650 milliGRAM(s) Oral every 6 hours PRN  donepezil 10 milliGRAM(s) Oral at bedtime  haloperidol     Tablet 2 milliGRAM(s) Oral every 12 hours PRN  melatonin 5 milliGRAM(s) Oral <User Schedule>  memantine 10 milliGRAM(s) Oral two times a day  mirtazapine 7.5 milliGRAM(s) Oral at bedtime  QUEtiapine 12.5 milliGRAM(s) Oral two times a day PRN  QUEtiapine 25 milliGRAM(s) Oral at bedtime    aluminum hydroxide/magnesium hydroxide/simethicone Suspension 30 milliLiter(s) Oral every 4 hours PRN    atorvastatin 80 milliGRAM(s) Oral at bedtime  levothyroxine 100 MICROGram(s) Oral daily    chlorhexidine 2% Cloths 1 Application(s) Topical daily    FAMILY HISTORY:    SOCIAL HISTORY:    [ ] Non-smoker  [ ] Smoker  [ ] Alcohol    Allergies    No Known Allergies    Intolerances    REVIEW OF SYSTEMS:  CONSTITUTIONAL: No fever, weight loss, + fatigue  EYES: No eye pain, visual disturbances, or discharge  ENMT:  No difficulty hearing, tinnitus, vertigo; No sinus or throat pain  NECK: No pain or stiffness  RESPIRATORY: No cough, wheezing, chills or hemoptysis; No Shortness of Breath  CARDIOVASCULAR: No chest pain, palpitations, passing out, dizziness, or leg swelling  GASTROINTESTINAL: No abdominal or epigastric pain. No nausea, vomiting, or hematemesis; No diarrhea or constipation. No melena or hematochezia.  GENITOURINARY: No dysuria, frequency, hematuria, or incontinence  NEUROLOGICAL: No headaches, memory loss, loss of strength, numbness, or tremors  SKIN: No itching, burning, rashes, or lesions   LYMPH Nodes: No enlarged glands  ENDOCRINE: No heat or cold intolerance; No hair loss  MUSCULOSKELETAL: No joint pain or swelling; No muscle, back, or extremity pain  PSYCHIATRIC: No depression, anxiety, mood swings, or difficulty sleeping  HEME/LYMPH: No easy bruising, or bleeding gums  ALLERY AND IMMUNOLOGIC: No hives or eczema	    [ ] All others negative	  [ ] Unable to obtain    PHYSICAL EXAM:  T(C): 37 (07-20-22 @ 14:44), Max: 37 (07-19-22 @ 17:44)  HR: 89 (07-20-22 @ 14:44) (57 - 94)  BP: 185/72 (07-20-22 @ 14:44) (165/73 - 198/69)  RR: 18 (07-20-22 @ 14:44) (18 - 18)  SpO2: 98% (07-20-22 @ 14:44) (96% - 98%)  Wt(kg): --  I&O's Summary    19 Jul 2022 07:01  -  20 Jul 2022 07:00  --------------------------------------------------------  IN: 720 mL / OUT: 600 mL / NET: 120 mL    Appearance: NAD	  HEENT: Normal oral mucosa, PERRL, EOMI	  Lymphatic: No lymphadenopathy  Cardiovascular: Normal S1 S2, No JVD, No murmurs, No edema  Respiratory: Lungs clear to auscultation	  Psychiatry: A & O x 3, Mood & affect appropriate  Gastrointestinal:  Soft, Non-tender, + BS	  Skin: No rashes, No ecchymoses, No cyanosis	  NEUROLOGICAL EXAM:  Mental status: Eyes open, awake, alert, oriented to person only, follows simple commands inconsistently, mild aphasia   Cranial Nerves: flattening of left NLF, no nystagmus, mod to severe dysarthria,  tongue midline  Motor exam: Normal tone, no drift, RUE 4/5, RLE 5/5, Left hemiparesis LUE 4/5, LLE 4/5,   Sensation: Intact to light touch   Coordination/ Gait: + dysmetria LUE, gait unsteady  Vascular: Peripheral pulses palpable 2+ bilaterally    TELEMETRY: SR  ECG:  NSR - no acute ischemic STT changes    < from: Limited Transthoracic Echo (07.18.22 @ 14:25) >  Patient name: MIGUEL ORDONEZ  YOB: 1940   Age: 82 (M)   MR#: [id number]  Study Date: 7/18/2022  [facility]  Study quality: Technically good  Referring Physician: Bharat Donis MD  Blood Pressure: 170/865 mmHg  Height: 163 cm  Weight: 60 kg  BSA: 1.6 m2  ------------------------------------------------------------------------  PROCEDURE: Limited transthoracic echocardiogram with 2-D.  M-Mode and spectral and color flow Doppler.  INDICATION: Other cerebrovascular disease (I67.89)  ------------------------------------------------------------------------  Dimensions:    Normal Values:  LA:            2.0 - 4.0 cm  Ao:            2.0 - 3.8 cm  SEPTUM:        0.6 - 1.2 cm  PWT:           0.6 - 1.1 cm  LVIDd:         3.0 - 5.6 cm  LVIDs:         1.8 - 4.0 cm  EF (Visual Estimate): 70 %  Doppler Peak Velocity (m/sec): AoV=1.6  ------------------------------------------------------------------------  Observations:  Mitral Valve: Normal mitral valve.  Aortic Valve/Aorta: Calcified aortic valve with normal  opening. Minimal aortic regurgitation.  Normal aortic root, aortic arch and descending thoracic  aorta.  Left Atrium: Normal left atrium.  Left Ventricle: Normal left ventricular internal dimensions  and wall thicknesses.  Normal left ventricular systolic function. No segmental  wall motion abnormalities.  Impaired LV-relaxation with normal filling pressure.  Right Heart: Normal right atrium. Normal right ventricular  size and systolic function.  Normal tricuspid valve. Normal pulmonic valve.  Pericardium/Pleura: Normal pericardium with no pericardial  effusion.  Hemodynamic: No evidnece of pulmonary hypertension.  Agitated saline injection demonstrates no evidence of a  patent foramen ovale.  ------------------------------------------------------------------------  Conclusions:  Normal left ventricular systolic function. No segmental  wall motion abnormalities.  Agitated saline injection demonstrates no evidence of a  patent foramen ovale.  ------------------------------------------------------------------------  Confirmed on  7/18/2022 - 18:11:20 by Epifanio Griffith MD, FASE  ------------------------------------------------------------------------    < end of copied text >    RADIOLOGY: < from: VA Duplex Lower Ext Vein Scan, Bilat (07.19.22 @ 14:06) >  ACC: [id number] EXAM:  DUPLEX SCAN EXT VEINS LOWER BI                          PROCEDURE DATE:  07/19/2022      INTERPRETATION:  CLINICAL INFORMATION: COVID positive, CVA    COMPARISON: None available.    TECHNIQUE: Duplex sonography of the BILATERAL LOWER extremity veins with   color and spectral Doppler, with and without compression.    FINDINGS:    RIGHT:  Normal compressibility of the RIGHT common femoral, femoral and popliteal   veins.  Doppler examination shows normal spontaneous and phasic flow.  No RIGHT calf vein thrombosis is detected.    LEFT:  Normal compressibility of the LEFT common femoral, femoral and popliteal   veins.  Doppler examination shows normal spontaneous and phasic flow.  No LEFT calf vein thrombosis is detected.    IMPRESSION:  No evidence of deep venous thrombosis in either lower extremity.    --- End of Report ---    < end of copied text >  < from: Xray Cinesophagram Swallow Function w/ Contrast (07.18.22 @ 15:07) >  ACC: [id number] EXAM:  XR SWAL FUNC TJ VID CON STDY                          PROCEDURE DATE:  07/18/2022      INTERPRETATION:  CLINICAL INFORMATION: Dysphagia. Recent stroke with   dysarthria and left-sided hemiparesis.    TECHNIQUE: Modified barium swallow was performed under fluoroscopy in   conjunction with speech pathology.    FLUORO TIME: 119.1 seconds    FINDINGS/  IMPRESSION:    There was evidence of penetration with thin liquids. There was no   aspiration with the tested consistencies.    For further information and recommendations, please refer to the speech   pathologist final report which is available for review in the electronic   medical record.    --- End of Report ---    < end of copied text >  < from: Xray Chest 1 View- PORTABLE-Routine (Xray Chest 1 View- PORTABLE-Routine .) (07.18.22 @ 15:04) >  ACC: [id number] EXAM:  XR CHEST PORTABLE ROUTINE 1V                          PROCEDURE DATE:  07/18/2022      INTERPRETATION:  INDICATION: Stroke with Covid.    COMPARISON: 3/31/14 plain chest. 9/30/16 CT scan of chest.    Technique: AP radiograph the chest    FINDINGS:  Heart/Vascular: Thoracic aortic atheromatous changes and ectasia are   stable.  Pulmonary: No focal infiltrates. No pleural effusion or pneumothorax.  Bones: No acute bony finding.    Impression:  Negative for acute cardiopulmonary process.    --- End of Report ---    < end of copied text >  < from: CT Head No Cont (07.18.22 @ 09:18) >  ACC: [id number] EXAM:  CT BRAIN                          PROCEDURE DATE:  07/18/2022      INTERPRETATION:  Clinical indication: Right MCA infarct.    Multiple axial sections were performed from base of vertex without   contrast enhancement. Coronal and sagittal reconstructions were performed.    This exam is compared prior head CT performed on July 16, 2021    Parenchymal volume loss and chronic microvascular ischemic changes are   identified.    Abnormal low-attenuation involving the right posterior frontal region is   identified. Some associated area of high attenuation which could be   compatible with area of hemorrhagic transformation. There is localized   mass effect seen. No significant shift or herniation is seen.    Old infarct involving the right sublenticular region.    Evaluation of the osseous structures with the appropriate window appears   unremarkable    Right maxillary polyp versus retention seen. Bilateral ethmoid sinus   mucosal thickening seen.    Both mastoid and middle ear appear clear.    IMPRESSION: Right MCA infarct is again seen with some hemorrhagic   transformation suspected.    --- End of Report --    < end of copied text >  < from: MR Head No Cont (07.17.22 @ 16:16) >    ACC: [id number] EXAM:  MR BRAIN                          PROCEDURE DATE:  07/17/2022      INTERPRETATION:  Clinical indication: Stroke code.    MRI of brain was performed using sagittal T1 axial T1 T2 T2 FLAIR   diffusion and susceptibility weighted sequence.    This exam is compared prior head CT performed on July 16, 2022 and   internal auditory canal MRI performed on June 9, 2013.    Parenchymal volume loss and chronic microvascular ischemic changes are   identified    Old lacunar infarctsinvolving the right basal ganglia region are   identified.    Abnormal T2 prolongation with restricted diffusion is seen involving the   right posterior frontal cortical and subcortical region. Involvement of   the right precentral gyrus is identified as well. These findings are   compatible with acute right MCA infarcts. There is a vague area of   associated susceptibility seen which could be compatible with hemorrhagic   transformation. No significant shift or herniation seen.    The large vessels demonstrate normal flow voids    Right maxillary polyp versus cyst is seen. Bilateral ethmoid sinus   mucosal thickening is seen.    The patient is status post bilateral cataract surgery.    IMPRESSION: Acute right MCA infarct.    --- End of Report ---    < end of copied text >    OTHER: 	  	  LABS:	 	    CARDIAC MARKERS: Troponin T, High Sensitivity Result: 15: Specimen not hemolyzed     proBNP:   Lipid Profile: Lipid Profile (07.16.22 @ 06:08)   Cholesterol, Serum: 189 mg/dL   Triglycerides, Serum: 102 mg/dL   HDL Cholesterol, Serum: 61 mg/dL   Non HDL Cholesterol: 128 LDL Cholesterol Calculated: 108 mg/dL (07.16.22 @ 06:08)   HgA1c: A1C with Estimated Average Glucose Result: 6.1: Method: Immunoassay   TSH:

## 2022-07-20 NOTE — BH CONSULTATION LIAISON ASSESSMENT NOTE - CURRENT MEDICATION
MEDICATIONS  (STANDING):  amLODIPine   Tablet 10 milliGRAM(s) Oral daily  apixaban 5 milliGRAM(s) Oral two times a day  atorvastatin 80 milliGRAM(s) Oral at bedtime  chlorhexidine 2% Cloths 1 Application(s) Topical daily  donepezil 10 milliGRAM(s) Oral at bedtime  levothyroxine 100 MICROGram(s) Oral daily  melatonin 5 milliGRAM(s) Oral <User Schedule>  memantine 10 milliGRAM(s) Oral two times a day  metoprolol succinate ER 25 milliGRAM(s) Oral daily  mirtazapine 7.5 milliGRAM(s) Oral at bedtime  QUEtiapine 25 milliGRAM(s) Oral at bedtime    MEDICATIONS  (PRN):  acetaminophen     Tablet .. 650 milliGRAM(s) Oral every 6 hours PRN Temp greater or equal to 38C (100.4F), Mild Pain (1 - 3), Moderate Pain (4 - 6), Severe Pain (7 - 10)  aluminum hydroxide/magnesium hydroxide/simethicone Suspension 30 milliLiter(s) Oral every 4 hours PRN Dyspepsia

## 2022-07-20 NOTE — BH CONSULTATION LIAISON ASSESSMENT NOTE - SUMMARY
Patient is a 82 y.o. male with a PMH of thyroid ca s/p thyroidectomy in 2018, hyperthyroid, HTN, TIA (15-20 years ago), alzheimer's disease and PPHx of depression who was admitted for a R MCA infarct. [facility] Psych was consulted for agitation.    Patient at this time does not need psychiatric admission and her neurocognitive symptoms/prognosis will not be altered by psychiatric admission. Patient will benefit from placement in a structured supervised setting vs additional services at home.

## 2022-07-20 NOTE — CONSULT NOTE ADULT - ASSESSMENT
82 y.o. M with PMH of thyroid ca s/p thyroidectomy in 2018, hyperthyroid, HTN, TIA (15-20 years ago), alzheimer's disease, and depression presented to [facility] ED due to L facial droop, L sided weakness and slurring of speech. 
Patient is a 82y male with a past history of thyroid cancer/thyroidectomy,  dementia, HTN, who was admitted on 7/15 when he developed left facial droop and left sided weakness and speech difficulty.  He was brought to ER , felt to likely have a Rt MCA CVA, and he received TPA.CT and MRI confirm this.  He has been stable, tested positive for Covid, has not had any fever or respiratory difficulty.  He is alert, has dysarthric speech, dementia limits history.It is unclear if he has been vaccinated.  He appears to have asymptomatic Covid, he is not coughing, there is no report of any fever, and his O2 sats are above 95% on RA.  As per chart, his neurological symptoms were acute, no report of any signs of recent respiratory infections.  Suggest:  1. Hold off on Covid treatment  2.Favor screening CXR, not obtained in ER as best I can tell  3.If he develops any fever I would send blood cultures as part of w/u  4.Will try to clarify Covid vaccine status but this would not impact on management.

## 2022-07-20 NOTE — CONSULT NOTE ADULT - SUBJECTIVE AND OBJECTIVE BOX
Patient is a 82y old  Male who presents with a chief complaint of L facial droop, L sided weakness, slurring of speech (20 Jul 2022 06:59)    Admission HPI:  82 y.o. M with PMH of thyroid ca s/p thyroidectomy in 2018, hyperthyroid, HTN, TIA (15-20 years ago), alzheimer's disease, and depression presented to [facility] ED due to L facial droop, L sided weakness and slurring of speech. LKN 22:00 hr on 7/15. Was asked by wife to take out trash and came back into the house ~22:15, started to have sx. Takes aspirin at home. Denies any toxic habits. No recent illnesses. Denies any headache, chest pain, numbness/tingling throughout the extremities.     NIHSS 11 for does not know month and age, L facial palsy, LUE some effort, LLE drift, mild aphasia, severe dysarthria  mRS 0 (15 Jul 2022 23:45)    Interval History:  Patient had imaging:  CT Brain Stroke Protocol (07.15.22 @ 23:09) >  No acute intracranial hemorrhage, mass effect, or midline shift. If   clinical symptoms persist or worsen, more sensitive evaluation with brain   MRI may be obtained, if no contraindications exist.    MR Head No Cont (07.17.22 @ 16:16) >  Acute right MCA infarct.    Patient noted to be COVID+- no respiratory symptoms- evaluated by ID and no intervention.     REVIEW OF SYSTEMS: No chest pain, shortness of breath, nausea, vomiting or diarhea; other ROS neg     PAST MEDICAL & SURGICAL HISTORY  Depressive disorder, not elsewhere classified  Hypertension  CVA (cerebral vascular accident)  No significant past surgical history    FUNCTIONAL HISTORY:   Lives w spouse in split level with 8 stairs.  PTA Independent    CURRENT FUNCTIONAL STATUS:  Min A transfers, Mod A gait    FAMILY HISTORY   No pertinent family history in first degree relatives    MEDICATIONS   acetaminophen     Tablet .. 650 milliGRAM(s) Oral every 6 hours PRN  aluminum hydroxide/magnesium hydroxide/simethicone Suspension 30 milliLiter(s) Oral every 4 hours PRN  amLODIPine   Tablet 10 milliGRAM(s) Oral daily  apixaban 5 milliGRAM(s) Oral two times a day  atorvastatin 80 milliGRAM(s) Oral at bedtime  chlorhexidine 2% Cloths 1 Application(s) Topical daily  donepezil 10 milliGRAM(s) Oral at bedtime  levothyroxine 100 MICROGram(s) Oral daily  melatonin 5 milliGRAM(s) Oral <User Schedule>  memantine 10 milliGRAM(s) Oral two times a day  metoprolol succinate ER 25 milliGRAM(s) Oral daily  mirtazapine 7.5 milliGRAM(s) Oral at bedtime  QUEtiapine 25 milliGRAM(s) Oral at bedtime    ALLERGIES  No Known Allergies    VITALS  T(C): 37 (07-20-22 @ 05:58), Max: 37 (07-19-22 @ 17:44)  HR: 57 (07-20-22 @ 05:58) (57 - 94)  BP: 168/70 (07-20-22 @ 05:58) (165/69 - 198/69)  RR: 18 (07-20-22 @ 05:58) (18 - 18)  SpO2: 98% (07-20-22 @ 05:58) (95% - 98%)  Wt(kg): --    PHYSICAL EXAM  Constitutional - NAD, Comfortable  HEENT - NCAT, EOMI  Neck - Supple, No limited ROM  Chest - CTA bilaterally, No wheeze, No rhonchi, No crackles  Cardiovascular - Celestino, S1S2, No murmurs  Abdomen - BS+, Soft, NTND  Extremities - No C/C/E, No calf tenderness   Neurologic Exam -                    Cognitive - Awake, Alert, AAO to self, place, date, year, situation     Communication - Fluent, No dysarthria, no aphasia     Cranial Nerves - CN 2-12 intact     Motor - No focal deficits      Sensory - Intact to LT     Reflexes - DTR Intact, No primitive reflexive  Psychiatric - Mood stable, Affect WNL    RECENT LABS/IMAGING  CBC Full  -  ( 18 Jul 2022 14:08 )  WBC Count : 6.19 K/uL  RBC Count : 4.32 M/uL  Hemoglobin : 12.0 g/dL  Hematocrit : 37.2 %  Platelet Count - Automated : 116 K/uL  Mean Cell Volume : 86.1 fl  Mean Cell Hemoglobin : 27.8 pg  Mean Cell Hemoglobin Concentration : 32.3 gm/dL  Auto Neutrophil # : x  Auto Lymphocyte # : x  Auto Monocyte # : x  Auto Eosinophil # : x  Auto Basophil # : x  Auto Neutrophil % : x  Auto Lymphocyte % : x  Auto Monocyte % : x  Auto Eosinophil % : x  Auto Basophil % : x    Impression:  81 yo with functional deficits secondary to diagnosis of Right MCA CVA    Plan:  PT- ROM, Bed Mob, Transfers, Amb w AD and bracing as needed  OT- ADLs, bracing  SLP- Dysphagia eval and treat  Prec- Falls, Cardiac  DVT Prophylaxis= Lovenox  Monitor for symptoms of COVID.  Skin- Turn q2 h  Dispo-  Patient is a 82y old  Male who presents with a chief complaint of L facial droop, L sided weakness, slurring of speech (20 Jul 2022 06:59)    Admission HPI:  82 y.o. M with PMH of thyroid ca s/p thyroidectomy in 2018, hyperthyroid, HTN, TIA (15-20 years ago), alzheimer's disease, and depression presented to [facility] ED due to L facial droop, L sided weakness and slurring of speech. LKN 22:00 hr on 7/15. Was asked by wife to take out trash and came back into the house ~22:15, started to have sx. Takes aspirin at home. Denies any toxic habits. No recent illnesses. Denies any headache, chest pain, numbness/tingling throughout the extremities.     NIHSS 11 for does not know month and age, L facial palsy, LUE some effort, LLE drift, mild aphasia, severe dysarthria  mRS 0 (15 Jul 2022 23:45)    Interval History:  Patient had imaging:  CT Brain Stroke Protocol (07.15.22 @ 23:09) >  No acute intracranial hemorrhage, mass effect, or midline shift. If   clinical symptoms persist or worsen, more sensitive evaluation with brain   MRI may be obtained, if no contraindications exist.    MR Head No Cont (07.17.22 @ 16:16) >  Acute right MCA infarct.    Patient noted to be COVID+- no respiratory symptoms- evaluated by ID and no intervention.     REVIEW OF SYSTEMS: Patient confused- denies all ROS     PAST MEDICAL & SURGICAL HISTORY  Depressive disorder, not elsewhere classified  Hypertension  CVA (cerebral vascular accident)  No significant past surgical history    FUNCTIONAL HISTORY:   Lives w spouse in split level with 8 stairs.  PTA Independent    CURRENT FUNCTIONAL STATUS:  Min A transfers, Mod A gait    FAMILY HISTORY   No pertinent family history in first degree relatives    MEDICATIONS   acetaminophen     Tablet .. 650 milliGRAM(s) Oral every 6 hours PRN  aluminum hydroxide/magnesium hydroxide/simethicone Suspension 30 milliLiter(s) Oral every 4 hours PRN  amLODIPine   Tablet 10 milliGRAM(s) Oral daily  apixaban 5 milliGRAM(s) Oral two times a day  atorvastatin 80 milliGRAM(s) Oral at bedtime  chlorhexidine 2% Cloths 1 Application(s) Topical daily  donepezil 10 milliGRAM(s) Oral at bedtime  levothyroxine 100 MICROGram(s) Oral daily  melatonin 5 milliGRAM(s) Oral <User Schedule>  memantine 10 milliGRAM(s) Oral two times a day  metoprolol succinate ER 25 milliGRAM(s) Oral daily  mirtazapine 7.5 milliGRAM(s) Oral at bedtime  QUEtiapine 25 milliGRAM(s) Oral at bedtime    ALLERGIES  No Known Allergies    VITALS  T(C): 37 (07-20-22 @ 05:58), Max: 37 (07-19-22 @ 17:44)  HR: 57 (07-20-22 @ 05:58) (57 - 94)  BP: 168/70 (07-20-22 @ 05:58) (165/69 - 198/69)  RR: 18 (07-20-22 @ 05:58) (18 - 18)  SpO2: 98% (07-20-22 @ 05:58) (95% - 98%)  Wt(kg): --    PHYSICAL EXAM  Constitutional - NAD, Comfortable  HEENT - NCAT, EOMI  Neck - Supple, No limited ROM  Chest - CTA bilaterally, No wheeze, No rhonchi, No crackles  Cardiovascular - Celestino, S1S2, No murmurs  Abdomen - BS+, Soft, NTND  Extremities - No C/C/E, No calf tenderness   Neurologic Exam -                 Alert  Follows verbal instruction  Amb Supervision - CG with poor dynamic balance  Motor 4+/5  AAO x 2  Short Term Memory 1/3  No clonus   Psychiatric - Mood stable, Affect WNL    RECENT LABS/IMAGING  CBC Full  -  ( 18 Jul 2022 14:08 )  WBC Count : 6.19 K/uL  RBC Count : 4.32 M/uL  Hemoglobin : 12.0 g/dL  Hematocrit : 37.2 %  Platelet Count - Automated : 116 K/uL  Mean Cell Volume : 86.1 fl  Mean Cell Hemoglobin : 27.8 pg  Mean Cell Hemoglobin Concentration : 32.3 gm/dL  Auto Neutrophil # : x  Auto Lymphocyte # : x  Auto Monocyte # : x  Auto Eosinophil # : x  Auto Basophil # : x  Auto Neutrophil % : x  Auto Lymphocyte % : x  Auto Monocyte % : x  Auto Eosinophil % : x  Auto Basophil % : x    Impression:  81 yo with functional deficits secondary to diagnosis of Right MCA CVA    Plan:  PT- ROM, Bed Mob, Transfers, Amb w AD and bracing as needed  OT- ADLs, bracing  SLP- Dysphagia eval and treat  Prec- Falls, Cardiac  DVT Prophylaxis- Lovenox  Monitor for symptoms of COVID.  Skin- Turn q2 h  Dispo- Acute Rehab- can tolerate 3h/d of therapies and requires daily physician visits

## 2022-07-20 NOTE — BH CONSULTATION LIAISON ASSESSMENT NOTE - NSBHATTESTCOMMENTATTENDFT_PSY_A_CORE
Pt is an 83 y/o  [place] male with hx of dementia, here for CVA, found to have aphasia and left sided weakness. psychiatry called for agitation. pt on seroquel 25 mg qhs for mood stability. denies current depression, is AOA x 3, states why he is here, no acute complaints, but pt with period of agitation last night, required haldol PRN. no si/hi, sleep and appetite fair. pt with hx of dementia, and per collateral from dtr, pt's confusion has been worsened this past year. agree with resident's assessment and plan to continue seroquel standing at night, PRN during the day for agitation. can f/u with outpt [facility] geriatric clinic.

## 2022-07-20 NOTE — PROGRESS NOTE ADULT - ASSESSMENT
Patient is a 82y male with a past history of thyroid cancer/thyroidectomy,  dementia, HTN, who was admitted on 7/15 when he developed left facial droop and left sided weakness and speech difficulty.  He was brought to ER , felt to likely have a Rt MCA CVA, and he received TPA.CT and MRI confirm this.  He has been stable, tested positive for Covid, has not had any fever or respiratory difficulty.  He is alert, has dysarthric speech, dementia limits history.It is unclear if he has been vaccinated.  He appears to have asymptomatic Covid, he is not coughing, there is no report of any fever, and his O2 sats are above 95% on RA.  As per chart, his neurological symptoms were acute, no report of any signs of recent respiratory infections.  His CXR is clear   Elevated D dimer noted, he certainly could have a degree of hypercoagulability from Covid as a cause of CVA  He does not appear to have any active respiratory symptoms, therefor hard to time onset of exposure/infection.  He has not developed any clinical  signs of a respiratory infection  Suggest:  1.Anticoagulation per neurology  2.supportive care   3.I think we can hold off on antivirals such as RDV, not clear if it would provide any benefit  4.blood culture any fever spikes  5.Disposition per primary service

## 2022-07-20 NOTE — BH CONSULTATION LIAISON ASSESSMENT NOTE - NSBHCHARTREVIEWLAB_PSY_A_CORE FT
.  LABS:                         12.0   6.19  )-----------( 116      ( 18 Jul 2022 14:08 )             37.2                     RADIOLOGY, EKG & ADDITIONAL TESTS: Reviewed.

## 2022-07-20 NOTE — CONSULT NOTE ADULT - PROBLEM SELECTOR RECOMMENDATION 2
SBP goal 100-160  c/w amlodipine as ordered  increased metoprolol to 50mg PO daily  continue to monitor BPs

## 2022-07-21 PROCEDURE — 99233 SBSQ HOSP IP/OBS HIGH 50: CPT | Mod: FS

## 2022-07-21 RX ORDER — POLYETHYLENE GLYCOL 3350 17 G/17G
17 POWDER, FOR SOLUTION ORAL DAILY
Refills: 0 | Status: DISCONTINUED | OUTPATIENT
Start: 2022-07-21 | End: 2022-07-27

## 2022-07-21 RX ADMIN — Medication 50 MILLIGRAM(S): at 06:01

## 2022-07-21 RX ADMIN — Medication 5 MILLIGRAM(S): at 11:48

## 2022-07-21 RX ADMIN — Medication 100 MICROGRAM(S): at 06:02

## 2022-07-21 RX ADMIN — QUETIAPINE FUMARATE 12.5 MILLIGRAM(S): 200 TABLET, FILM COATED ORAL at 01:05

## 2022-07-21 RX ADMIN — Medication 5 MILLIGRAM(S): at 20:27

## 2022-07-21 RX ADMIN — ATORVASTATIN CALCIUM 80 MILLIGRAM(S): 80 TABLET, FILM COATED ORAL at 20:27

## 2022-07-21 RX ADMIN — APIXABAN 5 MILLIGRAM(S): 2.5 TABLET, FILM COATED ORAL at 17:28

## 2022-07-21 RX ADMIN — MIRTAZAPINE 7.5 MILLIGRAM(S): 45 TABLET, ORALLY DISINTEGRATING ORAL at 20:27

## 2022-07-21 RX ADMIN — AMLODIPINE BESYLATE 10 MILLIGRAM(S): 2.5 TABLET ORAL at 06:01

## 2022-07-21 RX ADMIN — Medication 650 MILLIGRAM(S): at 00:10

## 2022-07-21 RX ADMIN — APIXABAN 5 MILLIGRAM(S): 2.5 TABLET, FILM COATED ORAL at 06:02

## 2022-07-21 RX ADMIN — MEMANTINE HYDROCHLORIDE 10 MILLIGRAM(S): 10 TABLET ORAL at 06:01

## 2022-07-21 RX ADMIN — DONEPEZIL HYDROCHLORIDE 10 MILLIGRAM(S): 10 TABLET, FILM COATED ORAL at 20:28

## 2022-07-21 RX ADMIN — POLYETHYLENE GLYCOL 3350 17 GRAM(S): 17 POWDER, FOR SOLUTION ORAL at 11:48

## 2022-07-21 RX ADMIN — CHLORHEXIDINE GLUCONATE 1 APPLICATION(S): 213 SOLUTION TOPICAL at 20:29

## 2022-07-21 RX ADMIN — MEMANTINE HYDROCHLORIDE 10 MILLIGRAM(S): 10 TABLET ORAL at 17:09

## 2022-07-21 RX ADMIN — QUETIAPINE FUMARATE 25 MILLIGRAM(S): 200 TABLET, FILM COATED ORAL at 20:27

## 2022-07-21 RX ADMIN — Medication 650 MILLIGRAM(S): at 20:49

## 2022-07-21 RX ADMIN — Medication 30 MILLILITER(S): at 01:00

## 2022-07-21 RX ADMIN — Medication 30 MILLILITER(S): at 20:25

## 2022-07-21 NOTE — PROGRESS NOTE ADULT - ASSESSMENT
82 y.o. M with PMH of thyroid ca s/p thyroidectomy in 2018, hyperthyroid, HTN, TIA (15-20 years ago), alzheimer's disease, and depression presented to [facility] ED due to L facial droop, L sided weakness and slurring of speech. LKN 22:00 hr on 7/15. Neuro exam fluctuating but remains with L hemiparesis and severe dysarthria. NIHSS 11 -> NIHSS 6 (does not know month, age, dysarthric, LUE drift, LUE ataxic). mRS 0. tPA administered 5.4 mg IV x1 @ 23:41-42 hr on 7/15. Bolus 49 mg started at 23:43  hr on 7/15. tPA administered > 30 min due to aggressive HTN management with IV medications. He was not a thrombectomy candidate due to no LVO    Impression: L hemiparesis and dysarthria due to acute ischemic stroke R MCA . Mechanism is unclear, Embolic secondary to ESUS vs. hypercoagulable state secondary to COVID 19+     NEURO: Agitated overnight, likely sundowning, Haldol 2mg IV administered. Continue close monitoring for neurologic deterioration, permissive HTN followed by gradual normotension, HgA1C 6.1 %, - continue high intensity statin,  Physical therapy/OT appreciated and recommended AR. SLP eval appreciated.     ANTITHROMBOTIC THERAPY: Transitioned to Eliquis given elevated D-Dimer (2086) hypercoagulable state secondary to Covid infection    PULMONARY:  CXR (07/18): No focal infiltrates. No pleural effusion or pneumothorax, protecting airway, saturating well     CARDIOVASCULAR: TTE shows EF70%, unremarkable, cardiac monitoring: no events, will increase Amlodipine to 10 mg daily  ( will gradually start home meds : Metoprolol ER 50mg daily) for BP management                             SBP goal: 110-160mmHg    GASTROINTESTINAL: passed dysphagia screen , aspiration precautions, s/p MBS on 7/18 with recommendations for minced and moist, tolerating well      Diet: Minced and moist     RENAL: BUN/Cr slightly elevated 34/1.07 likely pre-renal, dehydration, will encourage PO fluid intake, s/p straight cath on 07/17 given limited urine output. Will continue to monitor closely.      Na Goal: Greater than 135     Rawls: No    HEMATOLOGY:  H/H stable, anemia, Will likely need anticoagulation for 1 month due to elevated D-Dimer. LE doppler: negative for DVT     DVT ppx: no need as pt is on Eliquis    ENDO: h/o thyroid CA s/p thyroidectomy, postsurgical hypothyroidism: will continue Levothyroxine 100 mcg daily    ID: Covid +, afebrile, no leukocytosis . ID following: if fever will send blood cultures, Pt is on airborne/contact precautions, no indication for antivirals.    OTHER:  Depression, Dementia with sundowning at baseline.  Restarted home meds including Seroquel 25 mg q hs, Mirtazepine 7.5 mg qhs, Donepezil 10 mg q hs, Namenda 10 mg BID. Agitated on 07/19/22 better with current medication regimen, no longer on restraints. psych consult appreciated, Plan/goals  of care discussed with pt's daughter, Jordyn, over the phone     DISPOSITION: Acute Rehab once completed covid quarantine    CORE MEASURES:        Admission NIHSS: 11     TPA: YES       LDL/HDL: 108/61     Depression Screen:  P     Statin Therapy: Atorvastatin     Dysphagia Screen: PASS     Smoking  NO      Afib  NO     Stroke Education YES    Obtain screening lower extremity venous ultrasound in patients who meet 1 or more of the following criteria as patient is high risk for DVT/PE on admission:   [] History of DVT/PE  []Hypercoagulable states (Factor V Leiden, Cancer, OCP, etc. )  []Prolonged immobility (hemiplegia/hemiparesis/post operative or any other extended immobilization)  [] Transferred from outside facility (Rehab or Long term care)  [] Age </= to 50

## 2022-07-21 NOTE — PROGRESS NOTE ADULT - SUBJECTIVE AND OBJECTIVE BOX
Subjective: Patient seen and examined. No new events except as noted.   Remains in Stroke Unit.     REVIEW OF SYSTEMS:    CONSTITUTIONAL: + weakness, fevers or chills  EYES/ENT: No visual changes;  No vertigo or throat pain   NECK: No pain or stiffness  RESPIRATORY: No cough, wheezing, hemoptysis; No shortness of breath  CARDIOVASCULAR: No chest pain or palpitations  GASTROINTESTINAL: No abdominal or epigastric pain. No nausea, vomiting, or hematemesis; No diarrhea or constipation. No melena or hematochezia.  GENITOURINARY: No dysuria, frequency or hematuria  NEUROLOGICAL: No numbness or weakness  SKIN: No itching, burning, rashes, or lesions   All other review of systems is negative unless indicated above.    MEDICATIONS:  MEDICATIONS  (STANDING):  amLODIPine   Tablet 10 milliGRAM(s) Oral daily  apixaban 5 milliGRAM(s) Oral two times a day  atorvastatin 80 milliGRAM(s) Oral at bedtime  chlorhexidine 2% Cloths 1 Application(s) Topical daily  donepezil 10 milliGRAM(s) Oral at bedtime  levothyroxine 100 MICROGram(s) Oral daily  melatonin 5 milliGRAM(s) Oral <User Schedule>  memantine 10 milliGRAM(s) Oral two times a day  metoprolol succinate ER 50 milliGRAM(s) Oral daily  mirtazapine 7.5 milliGRAM(s) Oral at bedtime  QUEtiapine 25 milliGRAM(s) Oral at bedtime    PHYSICAL EXAM:  T(C): 36.4 (07-21-22 @ 04:00), Max: 37.7 (07-20-22 @ 20:09)  HR: 60 (07-21-22 @ 04:00) (60 - 89)  BP: 139/55 (07-21-22 @ 04:00) (139/55 - 185/72)  RR: 17 (07-21-22 @ 04:00) (16 - 18)  SpO2: 98% (07-21-22 @ 04:00) (95% - 99%)  Wt(kg): --  I&O's Summary    20 Jul 2022 07:01  -  21 Jul 2022 07:00  --------------------------------------------------------  IN: 1550 mL / OUT: 0 mL / NET: 1550 mL    21 Jul 2022 07:01  -  21 Jul 2022 09:33  --------------------------------------------------------  IN: 630 mL / OUT: 0 mL / NET: 630 mL    Appearance: NAD  HEENT: Normal oral mucosa, PERRL, EOMI	  Lymphatic: No lymphadenopathy , no edema  Cardiovascular: Normal S1 S2, No JVD, No murmurs , Peripheral pulses palpable 2+ bilaterally  Respiratory: Lungs clear to auscultation, normal effort 	  Gastrointestinal:  Soft, Non-tender, + BS	  Skin: No rashes, No ecchymoses, No cyanosis, warm to touch  NEUROLOGICAL EXAM:  Mental status: Eyes open, awake, alert, oriented to person only, follows simple commands inconsistently, mild aphasia   Cranial Nerves: flattening of left NLF, no nystagmus, mod to severe dysarthria,  tongue midline  Motor exam: Normal tone, no drift, RUE 4/5, RLE 5/5, Left hemiparesis LUE 4/5, LLE 4/5,   Sensation: Intact to light touch   Coordination/ Gait: + dysmetria LUE, gait unsteady  Ext: No edema    LABS:    CARDIAC MARKERS:    proBNP:   Lipid Profile:   HgA1c:   TSH:     TELEMETRY: SR 70-80	    ECG:  	  RADIOLOGY:   DIAGNOSTIC TESTING:  [ ] Echocardiogram:  [ ]  Catheterization:  [ ] Stress Test:    OTHER:

## 2022-07-21 NOTE — PROGRESS NOTE ADULT - ASSESSMENT
82 y.o. M with PMH of thyroid ca s/p thyroidectomy in 2018, hyperthyroid, HTN, TIA (15-20 years ago), alzheimer's disease, and depression presented to [facility] ED due to L facial droop, L sided weakness and slurring of speech.

## 2022-07-21 NOTE — PROGRESS NOTE ADULT - SUBJECTIVE AND OBJECTIVE BOX
THE PATIENT WAS SEEN AND EXAMINED BY ME WITH THE HOUSESTAFF AND STROKE TEAM DURING MORNING ROUNDS.   HPI:  82 y.o. M with PMH of thyroid ca s/p thyroidectomy in 2018, hyperthyroid, HTN, TIA (15-20 years ago), alzheimer's disease, and depression presented to [facility] ED due to L facial droop, L sided weakness and slurring of speech. LKN 22:00 hr on 7/15. Was asked by wife to take out trash and came back into the house ~22:15, started to have sx. Takes aspirin at home. Denies any toxic habits. No recent illnesses. Denies any headache, chest pain, numbness/tingling throughout the extremities. NIHSS 11 on admission. (He did not know month and age, L facial palsy, LUE some effort, LLE drift, mild aphasia, severe dysarthria), mRS 0.      SUBJECTIVE: No events overnight.  No new neurologic complaints.      acetaminophen     Tablet .. 650 milliGRAM(s) Oral every 6 hours PRN  aluminum hydroxide/magnesium hydroxide/simethicone Suspension 30 milliLiter(s) Oral every 4 hours PRN  amLODIPine   Tablet 10 milliGRAM(s) Oral daily  apixaban 5 milliGRAM(s) Oral two times a day  atorvastatin 80 milliGRAM(s) Oral at bedtime  chlorhexidine 2% Cloths 1 Application(s) Topical daily  donepezil 10 milliGRAM(s) Oral at bedtime  haloperidol     Tablet 2 milliGRAM(s) Oral every 12 hours PRN  levothyroxine 100 MICROGram(s) Oral daily  melatonin 5 milliGRAM(s) Oral <User Schedule>  memantine 10 milliGRAM(s) Oral two times a day  metoprolol succinate ER 50 milliGRAM(s) Oral daily  mirtazapine 7.5 milliGRAM(s) Oral at bedtime  QUEtiapine 12.5 milliGRAM(s) Oral two times a day PRN  QUEtiapine 25 milliGRAM(s) Oral at bedtime      PHYSICAL EXAM:   Vital Signs Last 24 Hrs  T(C): 36.4 (21 Jul 2022 04:00), Max: 37.7 (20 Jul 2022 20:09)  T(F): 97.5 (21 Jul 2022 04:00), Max: 99.9 (20 Jul 2022 20:09)  HR: 60 (21 Jul 2022 04:00) (60 - 89)  BP: 139/55 (21 Jul 2022 04:00) (139/55 - 185/72)  BP(mean): 80 (21 Jul 2022 04:00) (80 - 106)  RR: 17 (21 Jul 2022 04:00) (16 - 18)  SpO2: 98% (21 Jul 2022 04:00) (95% - 99%)    Parameters below as of 21 Jul 2022 04:00  Patient On (Oxygen Delivery Method): room air        General: No acute distress  HEENT: EOM intact, visual fields full  Abdomen: Soft, nontender, nondistended   Extremities: No edema    NEUROLOGICAL EXAM:  Mental status: Eyes open, awake, alert, oriented to person only, follows simple commands inconsistently, mild aphasia   Cranial Nerves: flattening of left NLF, no nystagmus, mod to severe dysarthria,  tongue midline  Motor exam: Normal tone, no drift, RUE 4/5, RLE 5/5, Left hemiparesis LUE 4/5, LLE 4/5,   Sensation: Intact to light touch   Coordination/ Gait: + dysmetria LUE, gait unsteady    LABS:             IMAGING: Reviewed by me.   CT Brain 07.18.22: Right MCA infarct is again seen with some hemorrhagic transformation suspected.    MRI HEAD 07/17/22: Acute right MCA infarct.    CT BRAIN 07.16.22 Developing acute infarct in the right middle cerebral artery vascular territory, involving the ventral aspect of the right frontoparietal convexity.  No hemorrhagic conversion.    CTA Neck and brain 07.15.22 No proximal large vessel intracranial occlusion. Atherosclerotic calcifications contributing to moderate luminal stenosis of the proximal left internal carotid artery by NASCET criteria.    CT Perfusion 07.15.22 No core infarct identified. Penumbra of 10 mL localized to the right MCA territory.    CT BRAIN 07.15.22 No acute intracranial hemorrhage, mass effect, or midline shift.    THE PATIENT WAS SEEN AND EXAMINED BY ME WITH THE HOUSESTAFF AND STROKE TEAM DURING MORNING ROUNDS.   HPI:  82 y.o. M with PMH of thyroid ca s/p thyroidectomy in 2018, hyperthyroid, HTN, TIA (15-20 years ago), alzheimer's disease, and depression presented to [facility] ED due to L facial droop, L sided weakness and slurring of speech. LKN 22:00 hr on 7/15. Was asked by wife to take out trash and came back into the house ~22:15, started to have sx. Takes aspirin at home. Denies any toxic habits. No recent illnesses. Denies any headache, chest pain, numbness/tingling throughout the extremities. NIHSS 11 on admission. (He did not know month and age, L facial palsy, LUE some effort, LLE drift, mild aphasia, severe dysarthria), mRS 0.      SUBJECTIVE: No events overnight.  No new neurologic complaints.      acetaminophen     Tablet .. 650 milliGRAM(s) Oral every 6 hours PRN  aluminum hydroxide/magnesium hydroxide/simethicone Suspension 30 milliLiter(s) Oral every 4 hours PRN  amLODIPine   Tablet 10 milliGRAM(s) Oral daily  apixaban 5 milliGRAM(s) Oral two times a day  atorvastatin 80 milliGRAM(s) Oral at bedtime  chlorhexidine 2% Cloths 1 Application(s) Topical daily  donepezil 10 milliGRAM(s) Oral at bedtime  haloperidol     Tablet 2 milliGRAM(s) Oral every 12 hours PRN  levothyroxine 100 MICROGram(s) Oral daily  melatonin 5 milliGRAM(s) Oral <User Schedule>  memantine 10 milliGRAM(s) Oral two times a day  metoprolol succinate ER 50 milliGRAM(s) Oral daily  mirtazapine 7.5 milliGRAM(s) Oral at bedtime  QUEtiapine 12.5 milliGRAM(s) Oral two times a day PRN  QUEtiapine 25 milliGRAM(s) Oral at bedtime      PHYSICAL EXAM:   Vital Signs Last 24 Hrs  T(C): 36.4 (21 Jul 2022 04:00), Max: 37.7 (20 Jul 2022 20:09)  T(F): 97.5 (21 Jul 2022 04:00), Max: 99.9 (20 Jul 2022 20:09)  HR: 60 (21 Jul 2022 04:00) (60 - 89)  BP: 139/55 (21 Jul 2022 04:00) (139/55 - 185/72)  BP(mean): 80 (21 Jul 2022 04:00) (80 - 106)  RR: 17 (21 Jul 2022 04:00) (16 - 18)  SpO2: 98% (21 Jul 2022 04:00) (95% - 99%)    Parameters below as of 21 Jul 2022 04:00  Patient On (Oxygen Delivery Method): room air        General: No acute distress  HEENT: EOM intact, visual fields full  Abdomen: Soft, nontender, nondistended   Extremities: No edema    NEUROLOGICAL EXAM:  Mental status: Eyes open, awake, alert, oriented to person only, follows simple commands inconsistently, mild aphasia   Cranial Nerves: flattening of left NLF, no nystagmus, mild dysarthria,  tongue midline  Motor exam: Normal tone, no drift, moves all extremities equally  Sensation: Intact to light touch   Coordination/ Gait:  gait unsteady    LABS:         IMAGING: Reviewed by me.   CT Brain 07.18.22: Right MCA infarct is again seen with some hemorrhagic transformation suspected.    MRI HEAD 07/17/22: Acute right MCA infarct.    CT BRAIN 07.16.22 Developing acute infarct in the right middle cerebral artery vascular territory, involving the ventral aspect of the right frontoparietal convexity.  No hemorrhagic conversion.    CTA Neck and brain 07.15.22 No proximal large vessel intracranial occlusion. Atherosclerotic calcifications contributing to moderate luminal stenosis of the proximal left internal carotid artery by NASCET criteria.    CT Perfusion 07.15.22 No core infarct identified. Penumbra of 10 mL localized to the right MCA territory.    CT BRAIN 07.15.22 No acute intracranial hemorrhage, mass effect, or midline shift.

## 2022-07-22 LAB
ANION GAP SERPL CALC-SCNC: 12 MMOL/L — SIGNIFICANT CHANGE UP (ref 5–17)
BUN SERPL-MCNC: 31 MG/DL — HIGH (ref 7–23)
CALCIUM SERPL-MCNC: 9.1 MG/DL — SIGNIFICANT CHANGE UP (ref 8.4–10.5)
CHLORIDE SERPL-SCNC: 100 MMOL/L — SIGNIFICANT CHANGE UP (ref 96–108)
CO2 SERPL-SCNC: 23 MMOL/L — SIGNIFICANT CHANGE UP (ref 22–31)
CREAT SERPL-MCNC: 1.1 MG/DL — SIGNIFICANT CHANGE UP (ref 0.5–1.3)
EGFR: 66 ML/MIN/1.73M2 — SIGNIFICANT CHANGE UP
GLUCOSE SERPL-MCNC: 100 MG/DL — HIGH (ref 70–99)
HCT VFR BLD CALC: 34.3 % — LOW (ref 39–50)
HGB BLD-MCNC: 11.3 G/DL — LOW (ref 13–17)
MCHC RBC-ENTMCNC: 27 PG — SIGNIFICANT CHANGE UP (ref 27–34)
MCHC RBC-ENTMCNC: 32.9 GM/DL — SIGNIFICANT CHANGE UP (ref 32–36)
MCV RBC AUTO: 82.1 FL — SIGNIFICANT CHANGE UP (ref 80–100)
NRBC # BLD: 0 /100 WBCS — SIGNIFICANT CHANGE UP (ref 0–0)
PLATELET # BLD AUTO: 122 K/UL — LOW (ref 150–400)
POTASSIUM SERPL-MCNC: 4.1 MMOL/L — SIGNIFICANT CHANGE UP (ref 3.5–5.3)
POTASSIUM SERPL-SCNC: 4.1 MMOL/L — SIGNIFICANT CHANGE UP (ref 3.5–5.3)
RBC # BLD: 4.18 M/UL — LOW (ref 4.2–5.8)
RBC # FLD: 12.8 % — SIGNIFICANT CHANGE UP (ref 10.3–14.5)
SODIUM SERPL-SCNC: 135 MMOL/L — SIGNIFICANT CHANGE UP (ref 135–145)
WBC # BLD: 6.65 K/UL — SIGNIFICANT CHANGE UP (ref 3.8–10.5)
WBC # FLD AUTO: 6.65 K/UL — SIGNIFICANT CHANGE UP (ref 3.8–10.5)

## 2022-07-22 PROCEDURE — 99233 SBSQ HOSP IP/OBS HIGH 50: CPT | Mod: FS

## 2022-07-22 PROCEDURE — 70450 CT HEAD/BRAIN W/O DYE: CPT | Mod: 26

## 2022-07-22 RX ORDER — HALOPERIDOL DECANOATE 100 MG/ML
1 INJECTION INTRAMUSCULAR ONCE
Refills: 0 | Status: COMPLETED | OUTPATIENT
Start: 2022-07-22 | End: 2022-07-22

## 2022-07-22 RX ADMIN — AMLODIPINE BESYLATE 10 MILLIGRAM(S): 2.5 TABLET ORAL at 06:06

## 2022-07-22 RX ADMIN — Medication 650 MILLIGRAM(S): at 08:55

## 2022-07-22 RX ADMIN — Medication 50 MILLIGRAM(S): at 06:06

## 2022-07-22 RX ADMIN — Medication 5 MILLIGRAM(S): at 12:07

## 2022-07-22 RX ADMIN — POLYETHYLENE GLYCOL 3350 17 GRAM(S): 17 POWDER, FOR SOLUTION ORAL at 12:07

## 2022-07-22 RX ADMIN — HALOPERIDOL DECANOATE 1 MILLIGRAM(S): 100 INJECTION INTRAMUSCULAR at 20:57

## 2022-07-22 RX ADMIN — APIXABAN 5 MILLIGRAM(S): 2.5 TABLET, FILM COATED ORAL at 17:13

## 2022-07-22 RX ADMIN — Medication 30 MILLILITER(S): at 15:30

## 2022-07-22 RX ADMIN — APIXABAN 5 MILLIGRAM(S): 2.5 TABLET, FILM COATED ORAL at 06:06

## 2022-07-22 RX ADMIN — HALOPERIDOL DECANOATE 1 MILLIGRAM(S): 100 INJECTION INTRAMUSCULAR at 18:45

## 2022-07-22 RX ADMIN — MEMANTINE HYDROCHLORIDE 10 MILLIGRAM(S): 10 TABLET ORAL at 17:24

## 2022-07-22 RX ADMIN — Medication 100 MICROGRAM(S): at 06:06

## 2022-07-22 RX ADMIN — MEMANTINE HYDROCHLORIDE 10 MILLIGRAM(S): 10 TABLET ORAL at 06:06

## 2022-07-22 NOTE — PROGRESS NOTE ADULT - ASSESSMENT
82 y.o. M with PMH of thyroid ca s/p thyroidectomy in 2018, hyperthyroid, HTN, TIA (15-20 years ago), alzheimer's disease, and depression presented to [facility] ED due to L facial droop, L sided weakness and slurring of speech. LKN 22:00 hr on 7/15. Neuro exam fluctuating but remains with L hemiparesis and severe dysarthria. NIHSS 11 -> NIHSS 6 (does not know month, age, dysarthric, LUE drift, LUE ataxic). mRS 0. tPA administered 5.4 mg IV x1 @ 23:41-42 hr on 7/15. Bolus 49 mg started at 23:43  hr on 7/15. tPA administered > 30 min due to aggressive HTN management with IV medications. He was not a thrombectomy candidate due to no LVO    Impression: L hemiparesis and dysarthria due to acute ischemic stroke R MCA . Mechanism is unclear, Embolic secondary to ESUS vs. hypercoagulable state secondary to COVID 19+     NEURO: Neurologically without change, Continue close monitoring for neurologic deterioration, permissive HTN followed by gradual normotension, HgA1C 6.1 %, - continue high intensity statin,  Physical therapy/OT appreciated and recommended AR. SLP eval appreciated.     ANTITHROMBOTIC THERAPY:  Eliquis given elevated D-Dimer (2086) hypercoagulable state secondary to Covid infection    PULMONARY:  CXR (07/18): No focal infiltrates. No pleural effusion or pneumothorax, protecting airway, saturating well     CARDIOVASCULAR: TTE shows EF70%, unremarkable, cardiac monitoring: no events, continue Amlodipine to 10 mg daily and Metoprolol ER 50mg daily for BP management                             SBP goal: 110-160mmHg    GASTROINTESTINAL: passed dysphagia screen , aspiration precautions, s/p MBS on 7/18 with recommendations for minced and moist, seen on 7/21 and diet upgraded, tolerating well      Diet: regular solids with thin liquids    RENAL: s/p straight cath on 07/17 given limited urine output. Will continue to monitor closely.      Na Goal: Greater than 135     Rawls: No    HEMATOLOGY: Will likely need anticoagulation for 1 month due to elevated D-Dimer. LE doppler: negative for DVT     DVT ppx: no need as pt is on Eliquis    ENDO: h/o thyroid CA s/p thyroidectomy, postsurgical hypothyroidism: will continue Levothyroxine 100 mcg daily    ID: Covid +, afebrile, ID following: if fever will send blood cultures, Pt is on airborne/contact precautions, no indication for antivirals.    OTHER:  Depression, Dementia with sundowning at baseline.  Restarted home meds including Seroquel 25 mg q hs, Mirtazepine 7.5 mg qhs, Donepezil 10 mg q hs, Namenda 10 mg BID. Agitated on 07/19/22 better with current medication regimen, no longer on restraints. psych consult appreciated, Plan/goals  of care discussed with pt's daughter, Jordyn, over the phone     DISPOSITION: Acute Rehab once completed covid quarantine    CORE MEASURES:        Admission NIHSS: 11     TPA: YES       LDL/HDL: 108/61     Depression Screen:  P     Statin Therapy: Atorvastatin     Dysphagia Screen: PASS     Smoking  NO      Afib  NO     Stroke Education YES    Obtain screening lower extremity venous ultrasound in patients who meet 1 or more of the following criteria as patient is high risk for DVT/PE on admission:   [] History of DVT/PE  []Hypercoagulable states (Factor V Leiden, Cancer, OCP, etc. )  []Prolonged immobility (hemiplegia/hemiparesis/post operative or any other extended immobilization)  [] Transferred from outside facility (Rehab or Long term care)  [] Age </= to 50

## 2022-07-22 NOTE — PROGRESS NOTE ADULT - SUBJECTIVE AND OBJECTIVE BOX
CC: f/u for Covid    Patient reports: he is alert, restless, offers no complaints    REVIEW OF SYSTEMS:  All other review of systems negative (Comprehensive ROS)    Antimicrobials Day #  :off    Other Medications Reviewed  MEDICATIONS  (STANDING):  amLODIPine   Tablet 10 milliGRAM(s) Oral daily  apixaban 5 milliGRAM(s) Oral two times a day  atorvastatin 80 milliGRAM(s) Oral at bedtime  bisacodyl 5 milliGRAM(s) Oral daily  chlorhexidine 2% Cloths 1 Application(s) Topical daily  donepezil 10 milliGRAM(s) Oral at bedtime  levothyroxine 100 MICROGram(s) Oral daily  melatonin 5 milliGRAM(s) Oral <User Schedule>  memantine 10 milliGRAM(s) Oral two times a day  metoprolol succinate ER 50 milliGRAM(s) Oral daily  mirtazapine 7.5 milliGRAM(s) Oral at bedtime  polyethylene glycol 3350 17 Gram(s) Oral daily  QUEtiapine 25 milliGRAM(s) Oral at bedtime    T(F): 98.4 (07-22-22 @ 07:39), Max: 98.4 (07-22-22 @ 07:39)  HR: 63 (07-22-22 @ 08:00)  BP: 157/75 (07-22-22 @ 08:00)  RR: 16 (07-22-22 @ 08:00)  SpO2: 98% (07-22-22 @ 08:00)  Wt(kg): --    PHYSICAL EXAM:  General: alert, no acute distress  Eyes:  anicteric, no conjunctival injection, no discharge  Oropharynx: no lesions or injection 	  Neck: supple, without adenopathy  Lungs: clear to auscultation  Heart: regular rate and rhythm; no murmur, rubs or gallops  Abdomen: soft, nondistended, nontender, without mass or organomegaly  Skin: no lesions  Extremities: no clubbing, cyanosis, or edema  Neurologic: alert, oriented, moves all extremities    LAB RESULTS:                        11.3   6.65  )-----------( 122      ( 22 Jul 2022 07:23 )             34.3     07-22    135  |  100  |  31<H>  ----------------------------<  100<H>  4.1   |  23  |  1.10    Ca    9.1      22 Jul 2022 07:22          MICROBIOLOGY:  RECENT CULTURES:      RADIOLOGY REVIEWED:    < from: [facility] Duplex Lower Ext Vein Scan, Hilario (07.19.22 @ 14:06) >  IMPRESSION:  No evidence of deep venous thrombosis in either lower extremity.    < end of copied text >

## 2022-07-22 NOTE — PROGRESS NOTE ADULT - ASSESSMENT
Patient is a 82y male with a past history of thyroid cancer/thyroidectomy,  dementia, HTN, who was admitted on 7/15 when he developed left facial droop and left sided weakness and speech difficulty.  He was brought to ER , felt to likely have a Rt MCA CVA, and he received TPA.CT and MRI confirm this.  He has been stable, tested positive for Covid, has not had any fever or respiratory difficulty.  He is alert, has dysarthric speech, dementia limits history.It is unclear if he has been vaccinated.  He appears to have asymptomatic Covid, he is not coughing, there is no report of any fever, and his O2 sats are above 95% on RA.  As per chart, his neurological symptoms were acute, no report of any signs of recent respiratory infections.  His CXR is clear   Elevated D dimer noted, he certainly could have a degree of hypercoagulability from Covid as a cause of CVA  He does not appear to have any active respiratory symptoms, therefor hard to time onset of exposure/infection.  He has not developed any clinical  signs of a respiratory infection.  He first tested positive 7/16-standard 10 day isolation  Suggest:  1.Anticoagulation per neurology  2.supportive care   3.I think we can hold off on antivirals such as RDV, not clear if it would provide any benefit  4.blood culture any fever spikes  5.Disposition per primary service

## 2022-07-22 NOTE — PROGRESS NOTE ADULT - SUBJECTIVE AND OBJECTIVE BOX
THE PATIENT WAS SEEN AND EXAMINED BY ME WITH THE HOUSESTAFF AND STROKE TEAM DURING MORNING ROUNDS.   HPI:  82 y.o. M with PMH of thyroid ca s/p thyroidectomy in 2018, hyperthyroid, HTN, TIA (15-20 years ago), alzheimer's disease, and depression presented to [facility] ED due to L facial droop, L sided weakness and slurring of speech. LKN 22:00 hr on 7/15. Was asked by wife to take out trash and came back into the house ~22:15, started to have sx. Takes aspirin at home. Denies any toxic habits. No recent illnesses. Denies any headache, chest pain, numbness/tingling throughout the extremities. NIHSS 11 on admission. (He did not know month and age, L facial palsy, LUE some effort, LLE drift, mild aphasia, severe dysarthria), mRS 0.    SUBJECTIVE: No events overnight.  No new neurologic complaints.  ROS reported negative unless otherwise noted.    acetaminophen     Tablet .. 650 milliGRAM(s) Oral every 6 hours PRN  aluminum hydroxide/magnesium hydroxide/simethicone Suspension 30 milliLiter(s) Oral every 4 hours PRN  amLODIPine   Tablet 10 milliGRAM(s) Oral daily  apixaban 5 milliGRAM(s) Oral two times a day  atorvastatin 80 milliGRAM(s) Oral at bedtime  bisacodyl 5 milliGRAM(s) Oral daily  chlorhexidine 2% Cloths 1 Application(s) Topical daily  donepezil 10 milliGRAM(s) Oral at bedtime  haloperidol     Tablet 2 milliGRAM(s) Oral every 12 hours PRN  levothyroxine 100 MICROGram(s) Oral daily  melatonin 5 milliGRAM(s) Oral <User Schedule>  memantine 10 milliGRAM(s) Oral two times a day  metoprolol succinate ER 50 milliGRAM(s) Oral daily  mirtazapine 7.5 milliGRAM(s) Oral at bedtime  polyethylene glycol 3350 17 Gram(s) Oral daily  QUEtiapine 12.5 milliGRAM(s) Oral two times a day PRN  QUEtiapine 25 milliGRAM(s) Oral at bedtime      PHYSICAL EXAM:   Vital Signs Last 24 Hrs  T(C): 36.6 (21 Jul 2022 20:00), Max: 37 (21 Jul 2022 10:54)  T(F): 97.8 (21 Jul 2022 20:00), Max: 98.6 (21 Jul 2022 10:54)  HR: 75 (22 Jul 2022 04:00) (55 - 86)  BP: 158/74 (22 Jul 2022 04:00) (106/91 - 174/69)  BP(mean): 99 (22 Jul 2022 04:00) (77 - 99)  RR: 17 (22 Jul 2022 04:00) (17 - 22)  SpO2: 98% (22 Jul 2022 04:00) (97% - 100%)    Parameters below as of 22 Jul 2022 04:00  Patient On (Oxygen Delivery Method): room air    General: No acute distress  HEENT: EOM intact, visual fields full  Abdomen: Soft, nontender, nondistended   Extremities: No edema    NEUROLOGICAL EXAM:  Mental status: Eyes open, awake, alert, oriented to person only, follows simple commands inconsistently, mild aphasia   Cranial Nerves: flattening of left NLF, no nystagmus, mild dysarthria,  tongue midline  Motor exam: Normal tone, no drift, moves all extremities equally  Sensation: Intact to light touch   Coordination/ Gait:  gait unsteady    IMAGING: Reviewed by me.     CT Brain 07.18.22: Right MCA infarct is again seen with some hemorrhagic transformation suspected.    MRI HEAD 07/17/22: Acute right MCA infarct.    CT BRAIN 07.16.22 Developing acute infarct in the right middle cerebral artery vascular territory, involving the ventral aspect of the right frontoparietal convexity.  No hemorrhagic conversion.    CTA Neck and brain 07.15.22 No proximal large vessel intracranial occlusion. Atherosclerotic calcifications contributing to moderate luminal stenosis of the proximal left internal carotid artery by NASCET criteria.    CT Perfusion 07.15.22 No core infarct identified. Penumbra of 10 mL localized to the right MCA territory.    CT BRAIN 07.15.22 No acute intracranial hemorrhage, mass effect, or midline shift.

## 2022-07-22 NOTE — PROGRESS NOTE ADULT - SUBJECTIVE AND OBJECTIVE BOX
Subjective: Patient seen and examined. No new events except as noted.   remains in Stroke unit     REVIEW OF SYSTEMS:    CONSTITUTIONAL:+ weakness, fevers or chills  EYES/ENT: No visual changes;  No vertigo or throat pain   NECK: No pain or stiffness  RESPIRATORY: No cough, wheezing, hemoptysis; No shortness of breath  CARDIOVASCULAR: No chest pain or palpitations  GASTROINTESTINAL: No abdominal or epigastric pain. No nausea, vomiting, or hematemesis; No diarrhea or constipation. No melena or hematochezia.  GENITOURINARY: No dysuria, frequency or hematuria  NEUROLOGICAL:+ numbness or weakness  SKIN: No itching, burning, rashes, or lesions   All other review of systems is negative unless indicated above.    MEDICATIONS:  MEDICATIONS  (STANDING):  amLODIPine   Tablet 10 milliGRAM(s) Oral daily  apixaban 5 milliGRAM(s) Oral two times a day  atorvastatin 80 milliGRAM(s) Oral at bedtime  bisacodyl 5 milliGRAM(s) Oral daily  chlorhexidine 2% Cloths 1 Application(s) Topical daily  donepezil 10 milliGRAM(s) Oral at bedtime  levothyroxine 100 MICROGram(s) Oral daily  melatonin 5 milliGRAM(s) Oral <User Schedule>  memantine 10 milliGRAM(s) Oral two times a day  metoprolol succinate ER 50 milliGRAM(s) Oral daily  mirtazapine 7.5 milliGRAM(s) Oral at bedtime  polyethylene glycol 3350 17 Gram(s) Oral daily  QUEtiapine 25 milliGRAM(s) Oral at bedtime      PHYSICAL EXAM:  T(C): 36.9 (07-22-22 @ 07:39), Max: 37 (07-21-22 @ 10:54)  HR: 63 (07-22-22 @ 08:00) (55 - 86)  BP: 157/75 (07-22-22 @ 08:00) (106/91 - 174/69)  RR: 16 (07-22-22 @ 08:00) (16 - 22)  SpO2: 98% (07-22-22 @ 08:00) (97% - 100%)  Wt(kg): --  I&O's Summary    21 Jul 2022 07:01  -  22 Jul 2022 07:00  --------------------------------------------------------  IN: 990 mL / OUT: 0 mL / NET: 990 mL    22 Jul 2022 07:01  -  22 Jul 2022 10:41  --------------------------------------------------------  IN: 240 mL / OUT: 0 mL / NET: 240 mL          Appearance: NAD  HEENT: Normal oral mucosa, PERRL, EOMI	  Lymphatic: No lymphadenopathy , no edema  Cardiovascular: Normal S1 S2, No JVD, No murmurs , Peripheral pulses palpable 2+ bilaterally  Respiratory: Lungs clear to auscultation, normal effort 	  Gastrointestinal:  Soft, Non-tender, + BS	  Skin: No rashes, No ecchymoses, No cyanosis, warm to touch  NEUROLOGICAL EXAM:  Mental status: Eyes open, awake, alert, oriented to person only, follows simple commands inconsistently, mild aphasia   Cranial Nerves: flattening of left NLF, no nystagmus, mod to severe dysarthria,  tongue midline  Motor exam: Normal tone, no drift, RUE 4/5, RLE 5/5, Left hemiparesis LUE 4/5, LLE 4/5,   Sensation: Intact to light touch   Coordination/ Gait: + dysmetria LUE, gait unsteady  Ext: No edema      LABS:    CARDIAC MARKERS:                                11.3   6.65  )-----------( 122      ( 22 Jul 2022 07:23 )             34.3     07-22    135  |  100  |  31<H>  ----------------------------<  100<H>  4.1   |  23  |  1.10    Ca    9.1      22 Jul 2022 07:22              TELEMETRY: 	 SR    ECG:  	  RADIOLOGY:   DIAGNOSTIC TESTING:  [ ] Echocardiogram:  [ ]  Catheterization:  [ ] Stress Test:    OTHER:

## 2022-07-22 NOTE — PROGRESS NOTE ADULT - ASSESSMENT
82 y.o. M with PMH of thyroid ca s/p thyroidectomy in 2018, hyperthyroid, HTN, TIA (15-20 years ago), alzheimer's disease, and depression presented to [facility] ED due to L facial droop, L sided weakness and slurring of speech.

## 2022-07-23 LAB
ANION GAP SERPL CALC-SCNC: 21 MMOL/L — HIGH (ref 5–17)
BUN SERPL-MCNC: 31 MG/DL — HIGH (ref 7–23)
CALCIUM SERPL-MCNC: 9.6 MG/DL — SIGNIFICANT CHANGE UP (ref 8.4–10.5)
CHLORIDE SERPL-SCNC: 97 MMOL/L — SIGNIFICANT CHANGE UP (ref 96–108)
CO2 SERPL-SCNC: 17 MMOL/L — LOW (ref 22–31)
CREAT SERPL-MCNC: 1.08 MG/DL — SIGNIFICANT CHANGE UP (ref 0.5–1.3)
EGFR: 68 ML/MIN/1.73M2 — SIGNIFICANT CHANGE UP
GLUCOSE SERPL-MCNC: 84 MG/DL — SIGNIFICANT CHANGE UP (ref 70–99)
HCT VFR BLD CALC: 35.9 % — LOW (ref 39–50)
HGB BLD-MCNC: 11.7 G/DL — LOW (ref 13–17)
MCHC RBC-ENTMCNC: 27.5 PG — SIGNIFICANT CHANGE UP (ref 27–34)
MCHC RBC-ENTMCNC: 32.6 GM/DL — SIGNIFICANT CHANGE UP (ref 32–36)
MCV RBC AUTO: 84.3 FL — SIGNIFICANT CHANGE UP (ref 80–100)
NRBC # BLD: 0 /100 WBCS — SIGNIFICANT CHANGE UP (ref 0–0)
PLATELET # BLD AUTO: 134 K/UL — LOW (ref 150–400)
POTASSIUM SERPL-MCNC: 5.3 MMOL/L — SIGNIFICANT CHANGE UP (ref 3.5–5.3)
POTASSIUM SERPL-SCNC: 5.3 MMOL/L — SIGNIFICANT CHANGE UP (ref 3.5–5.3)
RBC # BLD: 4.26 M/UL — SIGNIFICANT CHANGE UP (ref 4.2–5.8)
RBC # FLD: 12.8 % — SIGNIFICANT CHANGE UP (ref 10.3–14.5)
SODIUM SERPL-SCNC: 132 MMOL/L — LOW (ref 135–145)
WBC # BLD: 8.7 K/UL — SIGNIFICANT CHANGE UP (ref 3.8–10.5)
WBC # FLD AUTO: 8.7 K/UL — SIGNIFICANT CHANGE UP (ref 3.8–10.5)

## 2022-07-23 PROCEDURE — 99233 SBSQ HOSP IP/OBS HIGH 50: CPT | Mod: FS

## 2022-07-23 RX ADMIN — APIXABAN 5 MILLIGRAM(S): 2.5 TABLET, FILM COATED ORAL at 08:30

## 2022-07-23 RX ADMIN — HALOPERIDOL DECANOATE 2 MILLIGRAM(S): 100 INJECTION INTRAMUSCULAR at 08:30

## 2022-07-23 RX ADMIN — APIXABAN 5 MILLIGRAM(S): 2.5 TABLET, FILM COATED ORAL at 17:46

## 2022-07-23 RX ADMIN — Medication 50 MILLIGRAM(S): at 08:30

## 2022-07-23 RX ADMIN — MEMANTINE HYDROCHLORIDE 10 MILLIGRAM(S): 10 TABLET ORAL at 08:30

## 2022-07-23 RX ADMIN — Medication 650 MILLIGRAM(S): at 18:17

## 2022-07-23 RX ADMIN — QUETIAPINE FUMARATE 12.5 MILLIGRAM(S): 200 TABLET, FILM COATED ORAL at 17:46

## 2022-07-23 RX ADMIN — Medication 650 MILLIGRAM(S): at 17:47

## 2022-07-23 RX ADMIN — Medication 100 MICROGRAM(S): at 08:34

## 2022-07-23 RX ADMIN — QUETIAPINE FUMARATE 25 MILLIGRAM(S): 200 TABLET, FILM COATED ORAL at 22:00

## 2022-07-23 RX ADMIN — MIRTAZAPINE 7.5 MILLIGRAM(S): 45 TABLET, ORALLY DISINTEGRATING ORAL at 22:00

## 2022-07-23 RX ADMIN — AMLODIPINE BESYLATE 10 MILLIGRAM(S): 2.5 TABLET ORAL at 08:30

## 2022-07-23 RX ADMIN — MEMANTINE HYDROCHLORIDE 10 MILLIGRAM(S): 10 TABLET ORAL at 17:47

## 2022-07-23 RX ADMIN — Medication 5 MILLIGRAM(S): at 19:46

## 2022-07-23 NOTE — PROGRESS NOTE ADULT - SUBJECTIVE AND OBJECTIVE BOX
THE PATIENT WAS SEEN AND EXAMINED BY ME WITH THE HOUSESTAFF AND STROKE TEAM DURING MORNING ROUNDS.   HPI:  82 y.o. M with PMH of thyroid ca s/p thyroidectomy in 2018, hyperthyroid, HTN, TIA (15-20 years ago), alzheimer's disease, and depression presented to [facility] ED due to L facial droop, L sided weakness and slurring of speech. LKN 22:00 hr on 7/15. Was asked by wife to take out trash and came back into the house ~22:15, started to have sx. Takes aspirin at home. Denies any toxic habits. No recent illnesses. Denies any headache, chest pain, numbness/tingling throughout the extremities. NIHSS 11 on admission. (He did not know month and age, L facial palsy, LUE some effort, LLE drift, mild aphasia, severe dysarthria), mRS 0.    SUBJECTIVE: No events overnight.  No new neurologic complaints.  ROS reported negative unless otherwise noted.    acetaminophen     Tablet .. 650 milliGRAM(s) Oral every 6 hours PRN  aluminum hydroxide/magnesium hydroxide/simethicone Suspension 30 milliLiter(s) Oral every 4 hours PRN  amLODIPine   Tablet 10 milliGRAM(s) Oral daily  apixaban 5 milliGRAM(s) Oral two times a day  atorvastatin 80 milliGRAM(s) Oral at bedtime  bisacodyl 5 milliGRAM(s) Oral daily  chlorhexidine 2% Cloths 1 Application(s) Topical daily  donepezil 10 milliGRAM(s) Oral at bedtime  haloperidol     Tablet 2 milliGRAM(s) Oral every 12 hours PRN  levothyroxine 100 MICROGram(s) Oral daily  melatonin 5 milliGRAM(s) Oral <User Schedule>  memantine 10 milliGRAM(s) Oral two times a day  metoprolol succinate ER 50 milliGRAM(s) Oral daily  mirtazapine 7.5 milliGRAM(s) Oral at bedtime  polyethylene glycol 3350 17 Gram(s) Oral daily  QUEtiapine 12.5 milliGRAM(s) Oral two times a day PRN  QUEtiapine 25 milliGRAM(s) Oral at bedtime      PHYSICAL EXAM:   Vital Signs Last 24 Hrs  T(C): 37.1 (23 Jul 2022 01:21), Max: 37.1 (22 Jul 2022 17:30)  T(F): 98.8 (23 Jul 2022 01:21), Max: 98.8 (23 Jul 2022 01:21)  HR: 87 (23 Jul 2022 02:00) (63 - 92)  BP: 168/64 (23 Jul 2022 02:00) (152/70 - 176/77)  BP(mean): 94 (23 Jul 2022 02:00) (94 - 106)  RR: 29 (23 Jul 2022 02:00) (16 - 29)  SpO2: 98% (22 Jul 2022 22:13) (95% - 99%)    Parameters below as of 23 Jul 2022 02:00  Patient On (Oxygen Delivery Method): room air        General: No acute distress  HEENT: EOM intact, visual fields full  Abdomen: Soft, nontender, nondistended   Extremities: No edema    NEUROLOGICAL EXAM:  Mental status: Awake, alert, oriented x3, speech fluent, follows commands, no neglect, normal memory   Cranial Nerves: No facial asymmetry, no nystagmus, no dysarthria,  tongue midline  Motor exam: Normal tone, no drift, 5/5 RUE, 5/5 RLE, 5/5 LUE, 5/5 LLE, normal fine finger movements.  Sensation: Intact to light touch   Coordination/ Gait: No dysmetria, gait not tested    LABS:                        11.7   8.70  )-----------( 134      ( 23 Jul 2022 06:52 )             35.9    07-22    135  |  100  |  31<H>  ----------------------------<  100<H>  4.1   |  23  |  1.10    Ca    9.1      22 Jul 2022 07:22    IMAGING: Reviewed by me.     CT Head No Cont (07.23.2022)  Evolving early subacute infarct in the ventral right frontoparietal   convexity with gyriform petechial hemorrhage is unchanged from CT of   07/18/2022 and MRI of 07/17/2022.  No new intracranial findings.    CT Brain 07.18.22: Right MCA infarct is again seen with some hemorrhagic transformation suspected.    MRI HEAD 07/17/22: Acute right MCA infarct.    CT BRAIN 07.16.22 Developing acute infarct in the right middle cerebral artery vascular territory, involving the ventral aspect of the right frontoparietal convexity.  No hemorrhagic conversion.    CTA Neck and brain 07.15.22 No proximal large vessel intracranial occlusion. Atherosclerotic calcifications contributing to moderate luminal stenosis of the proximal left internal carotid artery by NASCET criteria.    CT Perfusion 07.15.22 No core infarct identified. Penumbra of 10 mL localized to the right MCA territory.    CT BRAIN 07.15.22 No acute intracranial hemorrhage, mass effect, or midline shift.      THE PATIENT WAS SEEN AND EXAMINED BY ME WITH THE HOUSESTAFF AND STROKE TEAM DURING MORNING ROUNDS.   HPI:  82 y.o. M with PMH of thyroid ca s/p thyroidectomy in 2018, hyperthyroid, HTN, TIA (15-20 years ago), alzheimer's disease, and depression presented to [facility] ED due to L facial droop, L sided weakness and slurring of speech. LKN 22:00 hr on 7/15. Was asked by wife to take out trash and came back into the house ~22:15, started to have sx. Takes aspirin at home. Denies any toxic habits. No recent illnesses. Denies any headache, chest pain, numbness/tingling throughout the extremities. NIHSS 11 on admission. (He did not know month and age, L facial palsy, LUE some effort, LLE drift, mild aphasia, severe dysarthria), mRS 0.    SUBJECTIVE: No events overnight.  No new neurologic complaints.  ROS reported negative unless otherwise noted.    acetaminophen     Tablet .. 650 milliGRAM(s) Oral every 6 hours PRN  aluminum hydroxide/magnesium hydroxide/simethicone Suspension 30 milliLiter(s) Oral every 4 hours PRN  amLODIPine   Tablet 10 milliGRAM(s) Oral daily  apixaban 5 milliGRAM(s) Oral two times a day  atorvastatin 80 milliGRAM(s) Oral at bedtime  bisacodyl 5 milliGRAM(s) Oral daily  chlorhexidine 2% Cloths 1 Application(s) Topical daily  donepezil 10 milliGRAM(s) Oral at bedtime  haloperidol     Tablet 2 milliGRAM(s) Oral every 12 hours PRN  levothyroxine 100 MICROGram(s) Oral daily  melatonin 5 milliGRAM(s) Oral <User Schedule>  memantine 10 milliGRAM(s) Oral two times a day  metoprolol succinate ER 50 milliGRAM(s) Oral daily  mirtazapine 7.5 milliGRAM(s) Oral at bedtime  polyethylene glycol 3350 17 Gram(s) Oral daily  QUEtiapine 12.5 milliGRAM(s) Oral two times a day PRN  QUEtiapine 25 milliGRAM(s) Oral at bedtime      PHYSICAL EXAM:   Vital Signs Last 24 Hrs  T(C): 37.1 (23 Jul 2022 01:21), Max: 37.1 (22 Jul 2022 17:30)  T(F): 98.8 (23 Jul 2022 01:21), Max: 98.8 (23 Jul 2022 01:21)  HR: 87 (23 Jul 2022 02:00) (63 - 92)  BP: 168/64 (23 Jul 2022 02:00) (152/70 - 176/77)  BP(mean): 94 (23 Jul 2022 02:00) (94 - 106)  RR: 29 (23 Jul 2022 02:00) (16 - 29)  SpO2: 98% (22 Jul 2022 22:13) (95% - 99%)    Parameters below as of 23 Jul 2022 02:00  Patient On (Oxygen Delivery Method): room air        General: No acute distress  HEENT: EOM intact, visual fields full  Abdomen: Soft, nontender, nondistended   Extremities: No edema    NEUROLOGICAL EXAM:  Mental status: Eyes open, awake, alert, oriented to person only, follows simple commands inconsistently, mild aphasia   Cranial Nerves: flattening of left NLF, no nystagmus, moderate dysarthria,  tongue midline  Motor exam: Normal tone, no drift, moves all extremities equally  Sensation: Intact to light touch   Coordination/ Gait:  gait unsteady      LABS:                        11.7   8.70  )-----------( 134      ( 23 Jul 2022 06:52 )             35.9    07-22    135  |  100  |  31<H>  ----------------------------<  100<H>  4.1   |  23  |  1.10    Ca    9.1      22 Jul 2022 07:22    IMAGING: Reviewed by me.     CT Head No Cont (07.23.2022)  Evolving early subacute infarct in the ventral right frontoparietal   convexity with gyriform petechial hemorrhage is unchanged from CT of   07/18/2022 and MRI of 07/17/2022.  No new intracranial findings.    CT Brain 07.18.22: Right MCA infarct is again seen with some hemorrhagic transformation suspected.    MRI HEAD 07/17/22: Acute right MCA infarct.    CT BRAIN 07.16.22 Developing acute infarct in the right middle cerebral artery vascular territory, involving the ventral aspect of the right frontoparietal convexity.  No hemorrhagic conversion.    CTA Neck and brain 07.15.22 No proximal large vessel intracranial occlusion. Atherosclerotic calcifications contributing to moderate luminal stenosis of the proximal left internal carotid artery by NASCET criteria.    CT Perfusion 07.15.22 No core infarct identified. Penumbra of 10 mL localized to the right MCA territory.    CT BRAIN 07.15.22 No acute intracranial hemorrhage, mass effect, or midline shift.

## 2022-07-23 NOTE — PROGRESS NOTE ADULT - ASSESSMENT
82 y.o. M with PMH of thyroid ca s/p thyroidectomy in 2018, hyperthyroid, HTN, TIA (15-20 years ago), alzheimer's disease, and depression presented to [facility] ED due to L facial droop, L sided weakness and slurring of speech.

## 2022-07-23 NOTE — PROGRESS NOTE ADULT - SUBJECTIVE AND OBJECTIVE BOX
CC: f/u for Covid    Patient reports: he appears comfortable, has been restless and agitated    REVIEW OF SYSTEMS:  All other review of systems negative (Comprehensive ROS): limited by lack of cooperation    Antimicrobials Day #  :off    Other Medications Reviewed  MEDICATIONS  (STANDING):  amLODIPine   Tablet 10 milliGRAM(s) Oral daily  apixaban 5 milliGRAM(s) Oral two times a day  atorvastatin 80 milliGRAM(s) Oral at bedtime  bisacodyl 5 milliGRAM(s) Oral daily  chlorhexidine 2% Cloths 1 Application(s) Topical daily  donepezil 10 milliGRAM(s) Oral at bedtime  levothyroxine 100 MICROGram(s) Oral daily  melatonin 5 milliGRAM(s) Oral <User Schedule>  memantine 10 milliGRAM(s) Oral two times a day  metoprolol succinate ER 50 milliGRAM(s) Oral daily  mirtazapine 7.5 milliGRAM(s) Oral at bedtime  polyethylene glycol 3350 17 Gram(s) Oral daily  QUEtiapine 25 milliGRAM(s) Oral at bedtime      T(F): 98.8 (07-23-22 @ 01:21), Max: 98.8 (07-23-22 @ 01:21)  HR: 87 (07-23-22 @ 02:00)  BP: 168/64 (07-23-22 @ 02:00)  RR: 29 (07-23-22 @ 02:00)  SpO2: 98% (07-22-22 @ 22:13)  Wt(kg): --    PHYSICAL EXAM:  General: alert, no acute distress  Eyes:  anicteric, no conjunctival injection, no discharge  Oropharynx: no lesions or injection 	  Neck: supple, without adenopathy  Lungs: clear to auscultation  Heart: regular rate and rhythm; no murmur, rubs or gallops  Abdomen: soft, nondistended, nontender, without mass or organomegaly  Skin: no lesions  Extremities: no clubbing, cyanosis, or edema  Neurologic: alert, restless moves all extremities    LAB RESULTS:                        11.3   6.65  )-----------( 122      ( 22 Jul 2022 07:23 )             34.3     07-22    135  |  100  |  31<H>  ----------------------------<  100<H>  4.1   |  23  |  1.10    Ca    9.1      22 Jul 2022 07:22          MICROBIOLOGY:  RECENT CULTURES:      RADIOLOGY REVIEWED:    < from: CT Head No Cont (07.22.22 @ 21:26) >  IMPRESSION:  Evolving early subacute infarct in the ventral right frontoparietal   convexity with gyriform petechial hemorrhage is unchanged from CT of   07/18/2022 and MRI of 07/17/2022.  No new intracranial findings.    --- End of Report ---    < end of copied text >

## 2022-07-23 NOTE — PROGRESS NOTE ADULT - SUBJECTIVE AND OBJECTIVE BOX
Subjective: Patient seen and examined. No new events except as noted.   Remains in Stroke Unit.    REVIEW OF SYSTEMS:    CONSTITUTIONAL: + weakness, fevers or chills  EYES/ENT: No visual changes;  No vertigo or throat pain   NECK: No pain or stiffness  RESPIRATORY: No cough, wheezing, hemoptysis; No shortness of breath  CARDIOVASCULAR: No chest pain or palpitations  GASTROINTESTINAL: No abdominal or epigastric pain. No nausea, vomiting, or hematemesis; No diarrhea or constipation. No melena or hematochezia.  GENITOURINARY: No dysuria, frequency or hematuria  NEUROLOGICAL: No numbness or weakness  SKIN: No itching, burning, rashes, or lesions   All other review of systems is negative unless indicated above.    MEDICATIONS:  MEDICATIONS  (STANDING):  amLODIPine   Tablet 10 milliGRAM(s) Oral daily  apixaban 5 milliGRAM(s) Oral two times a day  atorvastatin 80 milliGRAM(s) Oral at bedtime  bisacodyl 5 milliGRAM(s) Oral daily  chlorhexidine 2% Cloths 1 Application(s) Topical daily  donepezil 10 milliGRAM(s) Oral at bedtime  levothyroxine 100 MICROGram(s) Oral daily  melatonin 5 milliGRAM(s) Oral <User Schedule>  memantine 10 milliGRAM(s) Oral two times a day  metoprolol succinate ER 50 milliGRAM(s) Oral daily  mirtazapine 7.5 milliGRAM(s) Oral at bedtime  polyethylene glycol 3350 17 Gram(s) Oral daily  QUEtiapine 25 milliGRAM(s) Oral at bedtime    PHYSICAL EXAM:  T(C): 37.1 (07-23-22 @ 01:21), Max: 37.1 (07-22-22 @ 17:30)  HR: 85 (07-23-22 @ 07:07) (76 - 92)  BP: 156/60 (07-23-22 @ 07:07) (152/70 - 176/77)  RR: 19 (07-23-22 @ 07:07) (18 - 29)  SpO2: 99% (07-23-22 @ 07:07) (95% - 99%)  Wt(kg): --  I&O's Summary    22 Jul 2022 07:01  -  23 Jul 2022 07:00  --------------------------------------------------------  IN: 480 mL / OUT: 0 mL / NET: 480 mL    Appearance: NAD	  HEENT: Normal oral mucosa, PERRL, EOMI	  Lymphatic: No lymphadenopathy , no edema  Cardiovascular: Normal S1 S2, No JVD, No murmurs , Peripheral pulses palpable 2+ bilaterally  Respiratory: Lungs clear to auscultation, normal effort 	  Gastrointestinal:  Soft, Non-tender, + BS	  Skin: No rashes, No ecchymoses, No cyanosis, warm to touch  NEUROLOGICAL EXAM:  Mental status: Awake, alert, oriented x3, speech fluent, follows commands, no neglect, normal memory   Cranial Nerves: No facial asymmetry, no nystagmus, no dysarthria,  tongue midline  Motor exam: Normal tone, no drift, 5/5 RUE, 5/5 RLE, 5/5 LUE, 5/5 LLE, normal fine finger movements.  Sensation: Intact to light touch   Coordination/ Gait: No dysmetria, gait not tested    Ext: No edema    LABS:    CARDIAC MARKERS:                        11.7   8.70  )-----------( 134      ( 23 Jul 2022 06:52 )             35.9     07-23    132<L>  |  97  |  31<H>  ----------------------------<  84  5.3   |  17<L>  |  1.08    Ca    9.6      23 Jul 2022 06:52    proBNP:   Lipid Profile:   HgA1c:   TSH:     TELEMETRY: 	    ECG:  	  RADIOLOGY:   DIAGNOSTIC TESTING:  [ ] Echocardiogram:  [ ]  Catheterization:  [ ] Stress Test:    OTHER:

## 2022-07-24 LAB
ANION GAP SERPL CALC-SCNC: 13 MMOL/L — SIGNIFICANT CHANGE UP (ref 5–17)
BUN SERPL-MCNC: 35 MG/DL — HIGH (ref 7–23)
CALCIUM SERPL-MCNC: 8.6 MG/DL — SIGNIFICANT CHANGE UP (ref 8.4–10.5)
CHLORIDE SERPL-SCNC: 98 MMOL/L — SIGNIFICANT CHANGE UP (ref 96–108)
CO2 SERPL-SCNC: 22 MMOL/L — SIGNIFICANT CHANGE UP (ref 22–31)
CREAT SERPL-MCNC: 1.13 MG/DL — SIGNIFICANT CHANGE UP (ref 0.5–1.3)
EGFR: 64 ML/MIN/1.73M2 — SIGNIFICANT CHANGE UP
GLUCOSE SERPL-MCNC: 104 MG/DL — HIGH (ref 70–99)
HCT VFR BLD CALC: 32.1 % — LOW (ref 39–50)
HGB BLD-MCNC: 10.5 G/DL — LOW (ref 13–17)
MCHC RBC-ENTMCNC: 27.1 PG — SIGNIFICANT CHANGE UP (ref 27–34)
MCHC RBC-ENTMCNC: 32.7 GM/DL — SIGNIFICANT CHANGE UP (ref 32–36)
MCV RBC AUTO: 82.7 FL — SIGNIFICANT CHANGE UP (ref 80–100)
NRBC # BLD: 0 /100 WBCS — SIGNIFICANT CHANGE UP (ref 0–0)
PLATELET # BLD AUTO: 132 K/UL — LOW (ref 150–400)
POTASSIUM SERPL-MCNC: 4 MMOL/L — SIGNIFICANT CHANGE UP (ref 3.5–5.3)
POTASSIUM SERPL-SCNC: 4 MMOL/L — SIGNIFICANT CHANGE UP (ref 3.5–5.3)
RBC # BLD: 3.88 M/UL — LOW (ref 4.2–5.8)
RBC # FLD: 12.9 % — SIGNIFICANT CHANGE UP (ref 10.3–14.5)
SODIUM SERPL-SCNC: 133 MMOL/L — LOW (ref 135–145)
WBC # BLD: 8.82 K/UL — SIGNIFICANT CHANGE UP (ref 3.8–10.5)
WBC # FLD AUTO: 8.82 K/UL — SIGNIFICANT CHANGE UP (ref 3.8–10.5)

## 2022-07-24 PROCEDURE — 93970 EXTREMITY STUDY: CPT | Mod: 26

## 2022-07-24 PROCEDURE — 99233 SBSQ HOSP IP/OBS HIGH 50: CPT | Mod: FS

## 2022-07-24 PROCEDURE — 71045 X-RAY EXAM CHEST 1 VIEW: CPT | Mod: 26

## 2022-07-24 RX ORDER — LIDOCAINE 4 G/100G
1 CREAM TOPICAL ONCE
Refills: 0 | Status: COMPLETED | OUTPATIENT
Start: 2022-07-24 | End: 2022-07-24

## 2022-07-24 RX ORDER — LIDOCAINE 4 G/100G
1 CREAM TOPICAL DAILY
Refills: 0 | Status: DISCONTINUED | OUTPATIENT
Start: 2022-07-24 | End: 2022-07-27

## 2022-07-24 RX ADMIN — QUETIAPINE FUMARATE 25 MILLIGRAM(S): 200 TABLET, FILM COATED ORAL at 22:01

## 2022-07-24 RX ADMIN — MEMANTINE HYDROCHLORIDE 10 MILLIGRAM(S): 10 TABLET ORAL at 17:12

## 2022-07-24 RX ADMIN — APIXABAN 5 MILLIGRAM(S): 2.5 TABLET, FILM COATED ORAL at 17:11

## 2022-07-24 RX ADMIN — Medication 50 MILLIGRAM(S): at 05:05

## 2022-07-24 RX ADMIN — ATORVASTATIN CALCIUM 80 MILLIGRAM(S): 80 TABLET, FILM COATED ORAL at 22:01

## 2022-07-24 RX ADMIN — Medication 650 MILLIGRAM(S): at 11:42

## 2022-07-24 RX ADMIN — Medication 100 MICROGRAM(S): at 05:04

## 2022-07-24 RX ADMIN — Medication 650 MILLIGRAM(S): at 12:12

## 2022-07-24 RX ADMIN — CHLORHEXIDINE GLUCONATE 1 APPLICATION(S): 213 SOLUTION TOPICAL at 22:27

## 2022-07-24 RX ADMIN — DONEPEZIL HYDROCHLORIDE 10 MILLIGRAM(S): 10 TABLET, FILM COATED ORAL at 22:03

## 2022-07-24 RX ADMIN — MEMANTINE HYDROCHLORIDE 10 MILLIGRAM(S): 10 TABLET ORAL at 05:04

## 2022-07-24 RX ADMIN — DONEPEZIL HYDROCHLORIDE 10 MILLIGRAM(S): 10 TABLET, FILM COATED ORAL at 04:11

## 2022-07-24 RX ADMIN — Medication 650 MILLIGRAM(S): at 05:02

## 2022-07-24 RX ADMIN — Medication 5 MILLIGRAM(S): at 20:00

## 2022-07-24 RX ADMIN — AMLODIPINE BESYLATE 10 MILLIGRAM(S): 2.5 TABLET ORAL at 05:03

## 2022-07-24 RX ADMIN — QUETIAPINE FUMARATE 12.5 MILLIGRAM(S): 200 TABLET, FILM COATED ORAL at 11:41

## 2022-07-24 RX ADMIN — LIDOCAINE 1 PATCH: 4 CREAM TOPICAL at 17:19

## 2022-07-24 RX ADMIN — APIXABAN 5 MILLIGRAM(S): 2.5 TABLET, FILM COATED ORAL at 05:04

## 2022-07-24 RX ADMIN — LIDOCAINE 1 PATCH: 4 CREAM TOPICAL at 18:45

## 2022-07-24 RX ADMIN — ATORVASTATIN CALCIUM 80 MILLIGRAM(S): 80 TABLET, FILM COATED ORAL at 04:11

## 2022-07-24 RX ADMIN — MIRTAZAPINE 7.5 MILLIGRAM(S): 45 TABLET, ORALLY DISINTEGRATING ORAL at 22:03

## 2022-07-24 RX ADMIN — HALOPERIDOL DECANOATE 2 MILLIGRAM(S): 100 INJECTION INTRAMUSCULAR at 17:11

## 2022-07-24 NOTE — PROGRESS NOTE ADULT - ASSESSMENT
Patient is a 82y male with a past history of thyroid cancer/thyroidectomy,  dementia, HTN, who was admitted on 7/15 when he developed left facial droop and left sided weakness and speech difficulty.  He was brought to ER , felt to likely have a Rt MCA CVA, and he received TPA.CT and MRI confirm this.  He has been stable, tested positive for Covid, has not had any fever or respiratory difficulty.  He is alert, has dysarthric speech, dementia limits history.It is unclear if he has been vaccinated.  He appears to have asymptomatic Covid, he is not coughing, there is no report of any fever, and his O2 sats are above 95% on RA.  As per chart, his neurological symptoms were acute, no report of any signs of recent respiratory infections.  His CXR is clear   Elevated D dimer noted, he certainly could have a degree of hypercoagulability from Covid as a cause of CVA  He does not appear to have any active respiratory symptoms, therefor hard to time onset of exposure/infection.  He has not developed any clinical  signs of a respiratory infection.  He first tested positive 7/16-standard 10 day isolation  He had a low grade temp of 100 in past 24 hours, this will need to be monitored  Suggest:  1.Anticoagulation per neurology  2.supportive care   3.I think we can hold off on antivirals such as RDV, not clear if it would provide any benefit, now 1 week post initial positive PCR  4.blood culture any fever spikes  5.Disposition per primary service

## 2022-07-24 NOTE — PROGRESS NOTE ADULT - ASSESSMENT
82 y.o. M with PMH of thyroid ca s/p thyroidectomy in 2018, hyperthyroid, HTN, TIA (15-20 years ago), alzheimer's disease, and depression presented to [facility] ED due to L facial droop, L sided weakness and slurring of speech.

## 2022-07-24 NOTE — PROGRESS NOTE ADULT - SUBJECTIVE AND OBJECTIVE BOX
CC: f/u for Covid    Patient reports: he offers no specific complaints, no cough or chest discomfort.Less confused and restless as per RN    REVIEW OF SYSTEMS:  All other review of systems negative (Comprehensive ROS)    Antimicrobials Day #  :off    Other Medications Reviewed  MEDICATIONS  (STANDING):  amLODIPine   Tablet 10 milliGRAM(s) Oral daily  apixaban 5 milliGRAM(s) Oral two times a day  atorvastatin 80 milliGRAM(s) Oral at bedtime  bisacodyl 5 milliGRAM(s) Oral daily  chlorhexidine 2% Cloths 1 Application(s) Topical daily  donepezil 10 milliGRAM(s) Oral at bedtime  levothyroxine 100 MICROGram(s) Oral daily  melatonin 5 milliGRAM(s) Oral <User Schedule>  memantine 10 milliGRAM(s) Oral two times a day  metoprolol succinate ER 50 milliGRAM(s) Oral daily  mirtazapine 7.5 milliGRAM(s) Oral at bedtime  polyethylene glycol 3350 17 Gram(s) Oral daily  QUEtiapine 25 milliGRAM(s) Oral at bedtime    T(F): 98.7 (07-24-22 @ 00:00), Max: 100 (07-23-22 @ 10:19)  HR: 84 (07-24-22 @ 04:00)  BP: 143/63 (07-24-22 @ 04:00)  RR: 24 (07-24-22 @ 04:00)  SpO2: 97% (07-24-22 @ 04:00)  Wt(kg): --    PHYSICAL EXAM:  General: alert, no acute distress  Eyes:  anicteric, no conjunctival injection, no discharge  Oropharynx: no lesions or injection 	  Neck: supple, without adenopathy  Lungs: clear to auscultation  Heart: regular rate and rhythm; no murmur, rubs or gallops  Abdomen: soft, nondistended, nontender, without mass or organomegaly  Skin: no lesions  Extremities: no clubbing, cyanosis, or edema  Neurologic: alert, oriented, moves all extremities    LAB RESULTS:                        10.5   8.82  )-----------( 132      ( 24 Jul 2022 05:19 )             32.1     07-24    133<L>  |  98  |  35<H>  ----------------------------<  104<H>  4.0   |  22  |  1.13    Ca    8.6      24 Jul 2022 05:19          MICROBIOLOGY:  RECENT CULTURES:      RADIOLOGY REVIEWED:    < from: CT Head No Cont (07.22.22 @ 21:26) >  IMPRESSION:  Evolving early subacute infarct in the ventral right frontoparietal   convexity with gyriform petechial hemorrhage is unchanged from CT of   07/18/2022 and MRI of 07/17/2022.  No new intracranial findings.    --- End of Report ---    < end of copied text >  < from: Xray Chest 1 View- PORTABLE-Routine (Xray Chest 1 View- PORTABLE-Routine .) (07.18.22 @ 15:04) >    Impression:  Negative for acute cardiopulmonary process.    < end of copied text >

## 2022-07-24 NOTE — PROGRESS NOTE ADULT - SUBJECTIVE AND OBJECTIVE BOX
Subjective: Patient seen and examined. No new events except as noted.   remains in Stroke unit isolated for COVID       REVIEW OF SYSTEMS:    CONSTITUTIONAL:+ weakness, fevers or chills  EYES/ENT: No visual changes;  No vertigo or throat pain   NECK: No pain or stiffness  RESPIRATORY: No cough, wheezing, hemoptysis; No shortness of breath  CARDIOVASCULAR: No chest pain or palpitations  GASTROINTESTINAL: No abdominal or epigastric pain. No nausea, vomiting, or hematemesis; No diarrhea or constipation. No melena or hematochezia.  GENITOURINARY: No dysuria, frequency or hematuria  NEUROLOGICAL: + numbness or weakness  SKIN: No itching, burning, rashes, or lesions   All other review of systems is negative unless indicated above.    MEDICATIONS:  MEDICATIONS  (STANDING):  amLODIPine   Tablet 10 milliGRAM(s) Oral daily  apixaban 5 milliGRAM(s) Oral two times a day  atorvastatin 80 milliGRAM(s) Oral at bedtime  bisacodyl 5 milliGRAM(s) Oral daily  chlorhexidine 2% Cloths 1 Application(s) Topical daily  donepezil 10 milliGRAM(s) Oral at bedtime  levothyroxine 100 MICROGram(s) Oral daily  melatonin 5 milliGRAM(s) Oral <User Schedule>  memantine 10 milliGRAM(s) Oral two times a day  metoprolol succinate ER 50 milliGRAM(s) Oral daily  mirtazapine 7.5 milliGRAM(s) Oral at bedtime  polyethylene glycol 3350 17 Gram(s) Oral daily  QUEtiapine 25 milliGRAM(s) Oral at bedtime      PHYSICAL EXAM:  T(C): 37.1 (07-24-22 @ 00:00), Max: 37.8 (07-23-22 @ 10:19)  HR: 84 (07-24-22 @ 04:00) (61 - 84)  BP: 143/63 (07-24-22 @ 04:00) (120/46 - 151/58)  RR: 24 (07-24-22 @ 04:00) (15 - 24)  SpO2: 97% (07-24-22 @ 04:00) (96% - 98%)  Wt(kg): --  I&O's Summary    23 Jul 2022 07:01  -  24 Jul 2022 07:00  --------------------------------------------------------  IN: 720 mL / OUT: 0 mL / NET: 720 mL        Appearance: NAD	  HEENT: Normal oral mucosa, PERRL, EOMI	  Lymphatic: No lymphadenopathy , no edema  Cardiovascular: Normal S1 S2, No JVD, No murmurs , Peripheral pulses palpable 2+ bilaterally  Respiratory: Lungs clear to auscultation, normal effort 	  Gastrointestinal:  Soft, Non-tender, + BS	  Skin: No rashes, No ecchymoses, No cyanosis, warm to touch  NEUROLOGICAL EXAM:  Mental status: Awake, alert, oriented x3, speech fluent, follows commands, no neglect, normal memory   Cranial Nerves: No facial asymmetry, no nystagmus, no dysarthria,  tongue midline  Motor exam: Normal tone, no drift, 5/5 RUE, 5/5 RLE, 5/5 LUE, 5/5 LLE, normal fine finger movements.  Sensation: Intact to light touch   Coordination/ Gait: No dysmetria, gait not tested          LABS:    CARDIAC MARKERS:                                10.5   8.82  )-----------( 132      ( 24 Jul 2022 05:19 )             32.1     07-24    133<L>  |  98  |  35<H>  ----------------------------<  104<H>  4.0   |  22  |  1.13    Ca    8.6      24 Jul 2022 05:19      COVID-19 PCR . (07.19.22 @ 17:00)   COVID-19 PCR: Detected: You can help in the fight against COVID-19. [facility] may contact proBNP:             TELEMETRY: SR	    ECG:  	  RADIOLOGY: < from: Xray Cinesophagram Swallow Function w/ Contrast (07.18.22 @ 15:07) >    ACC: [id number] EXAM:  XR SWAL FUNSHANA TJ VID CON STDY                          PROCEDURE DATE:  07/18/2022          INTERPRETATION:  CLINICAL INFORMATION: Dysphagia. Recent stroke with   dysarthria and left-sided hemiparesis.    TECHNIQUE: Modified barium swallow was performed under fluoroscopy in   conjunction with speech pathology.    FLUORO TIME: 119.1 seconds    FINDINGS/  IMPRESSION:    There was evidence of penetration with thin liquids. There was no   aspiration with the tested consistencies.    For further information and recommendations, please refer to the speech   pathologist final report which is available for review in the electronic   medical record.    --- End of Report ---           RACHELLE EAST MD; Resident Radiologist  This document has been electronically signed.  CRISTIANA PALMA MD; Attending Radiologist  This document has been electronically signed. Jul 19 2022  4:17PM    < end of copied text >    DIAGNOSTIC TESTING:  [ ] Echocardiogram:  [ ]  Catheterization:  [ ] Stress Test:    OTHER:

## 2022-07-24 NOTE — PROGRESS NOTE ADULT - ASSESSMENT
82 y.o. M with PMH of thyroid ca s/p thyroidectomy in 2018, hyperthyroid, HTN, TIA (15-20 years ago), alzheimer's disease, and depression presented to [facility] ED due to L facial droop, L sided weakness and slurring of speech. LKN 22:00 hr on 7/15. Neuro exam fluctuating but remains with L hemiparesis and severe dysarthria. NIHSS 11 -> NIHSS 6 (does not know month, age, dysarthric, LUE drift, LUE ataxic). mRS 0. tPA administered 5.4 mg IV x1 @ 23:41-42 hr on 7/15. Bolus 49 mg started at 23:43  hr on 7/15. tPA administered > 30 min due to aggressive HTN management with IV medications. He was not a thrombectomy candidate due to no LVO    Impression: L hemiparesis and dysarthria due to acute ischemic stroke R MCA . Mechanism is unclear, Embolic secondary to ESUS vs. hypercoagulable state secondary to COVID 19+     NEURO: No obvious aphasia appears apathetic, Neurologically without change, will d/c Seroquel PRN dose due to increased lethargy and will start ativan 0.5mg PRN for agitation.  CTH on 7/22 without change, Continue close monitoring for neurologic deterioration, permissive HTN followed by gradual normotension, HgA1C 6.1 %, - continue high intensity statin,  Lidocaine patches PRN for back pain. Physical therapy/OT appreciated and recommended AR. SLP eval appreciated.     ANTITHROMBOTIC THERAPY:  Eliquis given elevated D-Dimer (2086) hypercoagulable state secondary to Covid infection    PULMONARY:  CXR (07/18): No focal infiltrates. No pleural effusion or pneumothorax, will obtain repeat CXR and encourage incentive spirometer protecting airway, saturating well     CARDIOVASCULAR: TTE shows EF70%, unremarkable, cardiac monitoring: no events, continue Amlodipine to 10 mg daily and Metoprolol ER 50mg daily for BP management. Will need ILR to screen for occult arrythmia prior to discharge.,                              SBP goal: 110-160mmHg    GASTROINTESTINAL: passed dysphagia screen , aspiration precautions, s/p MBS on 7/18 with recommendations for minced and moist, seen on 7/21 and diet upgraded, tolerating well      Diet: regular solids with thin liquids    RENAL: s/p straight cath on 07/17 due limited urine output. good urine output, Hyponatremia Will continue to monitor closely.      Na Goal: Greater than 135     Rawls: No    HEMATOLOGY: Will likely need anticoagulation for 1 month due to elevated D-Dimer. LE doppler on 07/19 and on 07/24: negative for DVT     DVT ppx: no need as pt is on Eliquis    ENDO: h/o thyroid CA s/p thyroidectomy, postsurgical hypothyroidism: will continue Levothyroxine 100 mcg daily    ID: Covid +, afebrile, ID following: if fever will send blood cultures, Pt is on airborne/contact precautions, no indication for antivirals.    OTHER:  Depression, Dementia with sundowning at baseline. Restarted home meds including Seroquel 25 mg q hs, Mirtazepine 7.5 mg qhs, Donepezil 10 mg q hs, Namenda 10 mg BID. Agitated on 07/19/22 better with current medication regimen, no longer on restraints. Psych consult appreciated, Plan/goals  of care discussed with pt's daughter, Jordyn.     DISPOSITION: Acute Rehab once completed covid quarantine    CORE MEASURES:        Admission NIHSS: 11     TPA: YES       LDL/HDL: 108/61     Depression Screen:  P     Statin Therapy: Atorvastatin     Dysphagia Screen: PASS     Smoking  NO      Afib  NO     Stroke Education YES    Obtain screening lower extremity venous ultrasound in patients who meet 1 or more of the following criteria as patient is high risk for DVT/PE on admission:   [] History of DVT/PE  []Hypercoagulable states (Factor V Leiden, Cancer, OCP, etc. )  []Prolonged immobility (hemiplegia/hemiparesis/post operative or any other extended immobilization)  [] Transferred from outside facility (Rehab or Long term care)  [] Age </= to 50

## 2022-07-24 NOTE — PROGRESS NOTE ADULT - SUBJECTIVE AND OBJECTIVE BOX
THE PATIENT WAS SEEN AND EXAMINED BY ME WITH THE HOUSESTAFF AND STROKE TEAM DURING MORNING ROUNDS.   HPI:  82 y.o. M with PMH of thyroid ca s/p thyroidectomy in 2018, hyperthyroid, HTN, TIA (15-20 years ago), alzheimer's disease, and depression presented to [facility] ED due to L facial droop, L sided weakness and slurring of speech. LKN 22:00 hr on 7/15. Was asked by wife to take out trash and came back into the house ~22:15, started to have sx. Takes aspirin at home. Denies any toxic habits. No recent illnesses. Denies any headache, chest pain, numbness/tingling throughout the extremities. NIHSS 11 on admission. (He did not know month and age, L facial palsy, LUE some effort, LLE drift, mild aphasia, severe dysarthria), mRS 0.    SUBJECTIVE: C/O back pain.  No new neurologic complaints, ROS reported negative unless otherwise noted.      acetaminophen     Tablet .. 650 milliGRAM(s) Oral every 6 hours PRN  aluminum hydroxide/magnesium hydroxide/simethicone Suspension 30 milliLiter(s) Oral every 4 hours PRN  amLODIPine   Tablet 10 milliGRAM(s) Oral daily  apixaban 5 milliGRAM(s) Oral two times a day  atorvastatin 80 milliGRAM(s) Oral at bedtime  bisacodyl 5 milliGRAM(s) Oral daily  chlorhexidine 2% Cloths 1 Application(s) Topical daily  donepezil 10 milliGRAM(s) Oral at bedtime  haloperidol     Tablet 2 milliGRAM(s) Oral every 12 hours PRN  levothyroxine 100 MICROGram(s) Oral daily  LORazepam     Tablet 0.5 milliGRAM(s) Oral daily PRN  melatonin 5 milliGRAM(s) Oral <User Schedule>  memantine 10 milliGRAM(s) Oral two times a day  metoprolol succinate ER 50 milliGRAM(s) Oral daily  mirtazapine 7.5 milliGRAM(s) Oral at bedtime  polyethylene glycol 3350 17 Gram(s) Oral daily  QUEtiapine 25 milliGRAM(s) Oral at bedtime      PHYSICAL EXAM:   Vital Signs Last 24 Hrs  T(C): 37.1 (24 Jul 2022 08:00), Max: 37.1 (24 Jul 2022 00:00)  T(F): 98.7 (24 Jul 2022 08:00), Max: 98.7 (24 Jul 2022 00:00)  HR: 79 (24 Jul 2022 16:00) (61 - 84)  BP: 143/73 (24 Jul 2022 16:00) (120/46 - 143/73)  BP(mean): 87 (24 Jul 2022 16:00) (65 - 89)  RR: 20 (24 Jul 2022 16:00) (15 - 24)  SpO2: 97% (24 Jul 2022 16:00) (96% - 98%)    Parameters below as of 24 Jul 2022 16:00  Patient On (Oxygen Delivery Method): room air        General: No acute distress  HEENT: EOM intact, visual fields full  Abdomen: Soft, nontender, nondistended   Extremities: No edema    NEUROLOGICAL EXAM:  Mental status: Eyes open, awake, apathetic, inattentive, oriented to person only, generates a few words only, long pauses when generating speech, requires repetitive prompting to generate single words, follows simple commands.  Cranial Nerves: flattening of left NLF, no nystagmus, moderate dysarthria,  tongue midline  Motor exam: Normal tone, no drift, moves all extremities equally  Sensation: Intact to light touch   Coordination/ Gait:  gait unsteady     LABS:                        10.5   8.82  )-----------( 132      ( 24 Jul 2022 05:19 )             32.1    07-24    133<L>  |  98  |  35<H>  ----------------------------<  104<H>  4.0   |  22  |  1.13    Ca    8.6      24 Jul 2022 05:19          IMAGING: Reviewed by me.     CT Head No Cont (07.23.2022)  Evolving early subacute infarct in the ventral right frontoparietal   convexity with gyriform petechial hemorrhage is unchanged from CT of   07/18/2022 and MRI of 07/17/2022.  No new intracranial findings.    CT Brain 07.18.22: Right MCA infarct is again seen with some hemorrhagic transformation suspected.    MRI HEAD 07/17/22: Acute right MCA infarct.    CT BRAIN 07.16.22 Developing acute infarct in the right middle cerebral artery vascular territory, involving the ventral aspect of the right frontoparietal convexity.  No hemorrhagic conversion.    CTA Neck and brain 07.15.22 No proximal large vessel intracranial occlusion. Atherosclerotic calcifications contributing to moderate luminal stenosis of the proximal left internal carotid artery by NASCET criteria.    CT Perfusion 07.15.22 No core infarct identified. Penumbra of 10 mL localized to the right MCA territory.    CT BRAIN 07.15.22 No acute intracranial hemorrhage, mass effect, or midline shift.

## 2022-07-25 LAB
ANION GAP SERPL CALC-SCNC: 13 MMOL/L — SIGNIFICANT CHANGE UP (ref 5–17)
APPEARANCE UR: CLEAR — SIGNIFICANT CHANGE UP
BACTERIA # UR AUTO: 0 — SIGNIFICANT CHANGE UP
BILIRUB UR-MCNC: NEGATIVE — SIGNIFICANT CHANGE UP
BUN SERPL-MCNC: 26 MG/DL — HIGH (ref 7–23)
CALCIUM SERPL-MCNC: 9 MG/DL — SIGNIFICANT CHANGE UP (ref 8.4–10.5)
CHLORIDE SERPL-SCNC: 97 MMOL/L — SIGNIFICANT CHANGE UP (ref 96–108)
CO2 SERPL-SCNC: 22 MMOL/L — SIGNIFICANT CHANGE UP (ref 22–31)
COLOR SPEC: COLORLESS — SIGNIFICANT CHANGE UP
CREAT SERPL-MCNC: 0.97 MG/DL — SIGNIFICANT CHANGE UP (ref 0.5–1.3)
DIFF PNL FLD: NEGATIVE — SIGNIFICANT CHANGE UP
EGFR: 77 ML/MIN/1.73M2 — SIGNIFICANT CHANGE UP
EPI CELLS # UR: 0 /HPF — SIGNIFICANT CHANGE UP
GLUCOSE SERPL-MCNC: 98 MG/DL — SIGNIFICANT CHANGE UP (ref 70–99)
GLUCOSE UR QL: NEGATIVE — SIGNIFICANT CHANGE UP
HCT VFR BLD CALC: 30.5 % — LOW (ref 39–50)
HGB BLD-MCNC: 10.3 G/DL — LOW (ref 13–17)
HYALINE CASTS # UR AUTO: 0 /LPF — SIGNIFICANT CHANGE UP (ref 0–2)
KETONES UR-MCNC: NEGATIVE — SIGNIFICANT CHANGE UP
LEUKOCYTE ESTERASE UR-ACNC: NEGATIVE — SIGNIFICANT CHANGE UP
MCHC RBC-ENTMCNC: 27.8 PG — SIGNIFICANT CHANGE UP (ref 27–34)
MCHC RBC-ENTMCNC: 33.8 GM/DL — SIGNIFICANT CHANGE UP (ref 32–36)
MCV RBC AUTO: 82.2 FL — SIGNIFICANT CHANGE UP (ref 80–100)
NITRITE UR-MCNC: NEGATIVE — SIGNIFICANT CHANGE UP
NRBC # BLD: 0 /100 WBCS — SIGNIFICANT CHANGE UP (ref 0–0)
PH UR: 5.5 — SIGNIFICANT CHANGE UP (ref 5–8)
PLATELET # BLD AUTO: 127 K/UL — LOW (ref 150–400)
POTASSIUM SERPL-MCNC: 3.7 MMOL/L — SIGNIFICANT CHANGE UP (ref 3.5–5.3)
POTASSIUM SERPL-SCNC: 3.7 MMOL/L — SIGNIFICANT CHANGE UP (ref 3.5–5.3)
PROT UR-MCNC: NEGATIVE — SIGNIFICANT CHANGE UP
RBC # BLD: 3.71 M/UL — LOW (ref 4.2–5.8)
RBC # FLD: 12.6 % — SIGNIFICANT CHANGE UP (ref 10.3–14.5)
RBC CASTS # UR COMP ASSIST: 0 /HPF — SIGNIFICANT CHANGE UP (ref 0–4)
SODIUM SERPL-SCNC: 132 MMOL/L — LOW (ref 135–145)
SP GR SPEC: 1.01 — LOW (ref 1.01–1.02)
UROBILINOGEN FLD QL: NEGATIVE — SIGNIFICANT CHANGE UP
WBC # BLD: 5.79 K/UL — SIGNIFICANT CHANGE UP (ref 3.8–10.5)
WBC # FLD AUTO: 5.79 K/UL — SIGNIFICANT CHANGE UP (ref 3.8–10.5)
WBC UR QL: 0 /HPF — SIGNIFICANT CHANGE UP (ref 0–5)

## 2022-07-25 PROCEDURE — 99233 SBSQ HOSP IP/OBS HIGH 50: CPT | Mod: FS

## 2022-07-25 RX ORDER — CYCLOBENZAPRINE HYDROCHLORIDE 10 MG/1
5 TABLET, FILM COATED ORAL ONCE
Refills: 0 | Status: COMPLETED | OUTPATIENT
Start: 2022-07-25 | End: 2022-07-25

## 2022-07-25 RX ADMIN — Medication 650 MILLIGRAM(S): at 14:50

## 2022-07-25 RX ADMIN — Medication 5 MILLIGRAM(S): at 19:48

## 2022-07-25 RX ADMIN — AMLODIPINE BESYLATE 10 MILLIGRAM(S): 2.5 TABLET ORAL at 05:59

## 2022-07-25 RX ADMIN — Medication 650 MILLIGRAM(S): at 14:18

## 2022-07-25 RX ADMIN — Medication 650 MILLIGRAM(S): at 20:46

## 2022-07-25 RX ADMIN — LIDOCAINE 1 PATCH: 4 CREAM TOPICAL at 18:44

## 2022-07-25 RX ADMIN — QUETIAPINE FUMARATE 25 MILLIGRAM(S): 200 TABLET, FILM COATED ORAL at 21:33

## 2022-07-25 RX ADMIN — Medication 30 MILLILITER(S): at 14:20

## 2022-07-25 RX ADMIN — Medication 650 MILLIGRAM(S): at 21:30

## 2022-07-25 RX ADMIN — MIRTAZAPINE 7.5 MILLIGRAM(S): 45 TABLET, ORALLY DISINTEGRATING ORAL at 21:33

## 2022-07-25 RX ADMIN — APIXABAN 5 MILLIGRAM(S): 2.5 TABLET, FILM COATED ORAL at 17:22

## 2022-07-25 RX ADMIN — CYCLOBENZAPRINE HYDROCHLORIDE 5 MILLIGRAM(S): 10 TABLET, FILM COATED ORAL at 22:33

## 2022-07-25 RX ADMIN — MEMANTINE HYDROCHLORIDE 10 MILLIGRAM(S): 10 TABLET ORAL at 05:59

## 2022-07-25 RX ADMIN — MEMANTINE HYDROCHLORIDE 10 MILLIGRAM(S): 10 TABLET ORAL at 17:21

## 2022-07-25 RX ADMIN — LIDOCAINE 1 PATCH: 4 CREAM TOPICAL at 14:19

## 2022-07-25 RX ADMIN — Medication 0.5 MILLIGRAM(S): at 05:59

## 2022-07-25 RX ADMIN — Medication 100 MICROGRAM(S): at 05:59

## 2022-07-25 RX ADMIN — Medication 50 MILLIGRAM(S): at 05:59

## 2022-07-25 RX ADMIN — ATORVASTATIN CALCIUM 80 MILLIGRAM(S): 80 TABLET, FILM COATED ORAL at 21:33

## 2022-07-25 RX ADMIN — HALOPERIDOL DECANOATE 2 MILLIGRAM(S): 100 INJECTION INTRAMUSCULAR at 14:18

## 2022-07-25 RX ADMIN — DONEPEZIL HYDROCHLORIDE 10 MILLIGRAM(S): 10 TABLET, FILM COATED ORAL at 21:33

## 2022-07-25 RX ADMIN — APIXABAN 5 MILLIGRAM(S): 2.5 TABLET, FILM COATED ORAL at 05:59

## 2022-07-25 NOTE — PROGRESS NOTE ADULT - SUBJECTIVE AND OBJECTIVE BOX
Subjective: Patient seen and examined. No new events except as noted.   Remains in Stroke Unit.     REVIEW OF SYSTEMS:    CONSTITUTIONAL: + weakness, fevers or chills  EYES/ENT: No visual changes;  No vertigo or throat pain   NECK: No pain or stiffness  RESPIRATORY: No cough, wheezing, hemoptysis; No shortness of breath  CARDIOVASCULAR: No chest pain or palpitations  GASTROINTESTINAL: No abdominal or epigastric pain. No nausea, vomiting, or hematemesis; No diarrhea or constipation. No melena or hematochezia.  GENITOURINARY: No dysuria, frequency or hematuria  NEUROLOGICAL: No numbness or weakness  SKIN: No itching, burning, rashes, or lesions   All other review of systems is negative unless indicated above.    MEDICATIONS:  MEDICATIONS  (STANDING):  amLODIPine   Tablet 10 milliGRAM(s) Oral daily  apixaban 5 milliGRAM(s) Oral two times a day  atorvastatin 80 milliGRAM(s) Oral at bedtime  bisacodyl 5 milliGRAM(s) Oral daily  chlorhexidine 2% Cloths 1 Application(s) Topical daily  donepezil 10 milliGRAM(s) Oral at bedtime  levothyroxine 100 MICROGram(s) Oral daily  melatonin 5 milliGRAM(s) Oral <User Schedule>  memantine 10 milliGRAM(s) Oral two times a day  metoprolol succinate ER 50 milliGRAM(s) Oral daily  mirtazapine 7.5 milliGRAM(s) Oral at bedtime  polyethylene glycol 3350 17 Gram(s) Oral daily  QUEtiapine 25 milliGRAM(s) Oral at bedtime    PHYSICAL EXAM:  T(C): 36.6 (07-25-22 @ 08:00), Max: 37.8 (07-25-22 @ 00:00)  HR: 52 (07-25-22 @ 08:00) (52 - 92)  BP: 136/61 (07-25-22 @ 08:00) (136/61 - 177/84)  RR: 20 (07-25-22 @ 08:00) (19 - 25)  SpO2: 97% (07-25-22 @ 08:00) (97% - 99%)  Wt(kg): --  I&O's Summary    24 Jul 2022 07:01  -  25 Jul 2022 07:00  --------------------------------------------------------  IN: 780 mL / OUT: 0 mL / NET: 780 mL    25 Jul 2022 07:01  -  25 Jul 2022 12:35  --------------------------------------------------------  IN: 240 mL / OUT: 0 mL / NET: 240 mL    Appearance: NAD  HEENT: Normal oral mucosa, PERRL, EOMI	  Lymphatic: No lymphadenopathy , no edema  Cardiovascular: Normal S1 S2, No JVD, No murmurs , Peripheral pulses palpable 2+ bilaterally  Respiratory: Lungs clear to auscultation, normal effort 	  Gastrointestinal:  Soft, Non-tender, + BS	  Skin: No rashes, No ecchymoses, No cyanosis, warm to touch  NEUROLOGICAL EXAM:  Mental status: Eyes open, awake, apathetic, inattentive, oriented to person only, generates a few words only, long pauses when generating speech, requires repetitive prompting to generate single words, follows simple commands.  Cranial Nerves: flattening of left NLF, no nystagmus, moderate dysarthria,  tongue midline  Motor exam: Normal tone, no drift, moves all extremities equally  Sensation: Intact to light touch   Coordination/ Gait:  gait unsteady   Ext: No edema    LABS:    CARDIAC MARKERS:                       10.3   5.79  )-----------( 127      ( 25 Jul 2022 06:07 )             30.5     07-25    132<L>  |  97  |  26<H>  ----------------------------<  98  3.7   |  22  |  0.97    Ca    9.0      25 Jul 2022 06:06    proBNP:   Lipid Profile:   HgA1c:   TSH:     TELEMETRY: 	    ECG:  	  RADIOLOGY:   DIAGNOSTIC TESTING:  [ ] Echocardiogram:  [ ]  Catheterization:  [ ] Stress Test:    OTHER:

## 2022-07-25 NOTE — DIETITIAN INITIAL EVALUATION ADULT - OTHER INFO
Per chart, pt currently ordered for synthroid, Remeron, and atorvastatin in-house.    Reports  lbs. Denies recent wt changes.   Dosing wt: 133 lbs (07-15)  Wt history per chart: 147 lbs (04/20/2017). Suspect 7 lb wt loss - unknown time frame of wt loss. RD to continue to monitor weight trends as able/available.     Pt made aware RD remains available.

## 2022-07-25 NOTE — DIETITIAN INITIAL EVALUATION ADULT - PERTINENT LABORATORY DATA
07-25    132<L>  |  97  |  26<H>  ----------------------------<  98  3.7   |  22  |  0.97    Ca    9.0      25 Jul 2022 06:06    A1C with Estimated Average Glucose Result: 6.1 % (07-16-22 @ 06:05)

## 2022-07-25 NOTE — DIETITIAN INITIAL EVALUATION ADULT - REASON
Nutrition-focused physical examination deferred at this time - pt confused, with Alzheimer's disease, unable to provide proper verbal consent. No overt signs of fat/muscle wasting visually observed.

## 2022-07-25 NOTE — PROGRESS NOTE ADULT - SUBJECTIVE AND OBJECTIVE BOX
CC: f/u for  covid  Patient reports he wants to be in a chair    REVIEW OF SYSTEMS:  All other review of systems negative (Comprehensive ROS)    Antimicrobials Day #  :    Other Medications Reviewed    T(F): 97.8 (07-25-22 @ 08:00), Max: 100 (07-25-22 @ 00:00)  HR: 72 (07-25-22 @ 16:00)  BP: 140/61 (07-25-22 @ 16:00)  RR: 16 (07-25-22 @ 16:00)  SpO2: 98% (07-25-22 @ 16:00)  Wt(kg): --    PHYSICAL EXAM:  General: alert, no acute distress  Eyes:  anicteric, no conjunctival injection, no discharge  Oropharynx: no lesions or injection 	  Neck: supple, without adenopathy  Lungs: clear to auscultation  Heart: regular rate and rhythm; no murmur, rubs or gallops  Abdomen: soft, nondistended, nontender, without mass or organomegaly  Skin: no lesions  Extremities: no clubbing, cyanosis, or edema  Neurologic: alert, oriented, moves all extremities    LAB RESULTS:                        10.3   5.79  )-----------( 127      ( 25 Jul 2022 06:07 )             30.5     07-25    132<L>  |  97  |  26<H>  ----------------------------<  98  3.7   |  22  |  0.97    Ca    9.0      25 Jul 2022 06:06          MICROBIOLOGY:  RECENT CULTURES:      RADIOLOGY REVIEWED:  < from: Xray Chest 1 View- PORTABLE-Routine (Xray Chest 1 View- PORTABLE-Routine .) (07.24.22 @ 19:38) >    ACC: [id number] EXAM:  XR CHEST PORTABLE ROUTINE 1V                          PROCEDURE DATE:  07/24/2022          INTERPRETATION:  Chest one view    HISTORY: Pneumonia    COMPARISON STUDY: 7/18/2022    Frontal expiratory view of the chest shows the heart to be normal in   size. The lungs are clear and there is no evidence of pneumothorax nor   pleural effusion.    IMPRESSION:  No active pulmonary disease.      < end of copied text >              < from: CT Head No Cont (07.22.22 @ 21:26) >    ACC: [id number] EXAM:  CT BRAIN                          PROCEDURE DATE:  07/22/2022          INTERPRETATION:  CLINICAL INFORMATION: Left upper extremity weakness.    Follow-up right MCA infarct    TECHNIQUE:  Axial CT images were acquired through the head.  Intravenous contrast: None  Two-dimensional reformats were generated.    COMPARISON STUDY: Head CT scans from 07/16/2022 and 07/18/2020.  MRI   brain from 07/17/2022.    FINDINGS:  Evolving early subacute infarct in the ventral right frontoparietal   convexity with gyriform petechial hemorrhage is unchanged from CT of   07/18/2022 and MRI of 07/17/2022.    There is mild generalized cerebral volume loss and mild periventricular   white matter hypoattenuation compatible chronic microvascular ischemic   disease.  There is a small chronic transcortical infarct in the right   frontal convexity.  There are chronic lacunar infarcts in the right basal   ganglia.    There is patchy mucosal thickening in the paranasal sinuses without   air-fluid level.    The mastoids are underpneumatized bilaterally.  No clinically significant   mastoid or middle ear effusion is visualized.    The patient is status post cataract lens replacement surgery bilaterally.    The calvarium and skull base are intact.      IMPRESSION:  Evolving early subacute infarct in the ventral right frontoparietal   convexity with gyriform petechial hemorrhage is unchanged from CT of   07/18/2022 and MRI of 07/17/2022.  No new intracranial findings.    --- End of Report ---    < end of copied text >  Assessment:  Elderly man with history of dementia, thyroid ca s/p thyroidectomy  admitted with left sided weakness and slurred speech, found to have right mca cva s/p tpa. Tested positive for covid, no hypoxia and no jacob fever over 100. He has been positive back to 7/16 so rdv would not likely be helpful at this point anyway. Dopplers are neg, exam is unrevealing for infection source, he has no hypoxia.   Plan:  monitor off antimicrobics  do urinalysis and blood cultures

## 2022-07-25 NOTE — DIETITIAN INITIAL EVALUATION ADULT - ORAL INTAKE PTA/DIET HISTORY
Pt interviewed at bedside, though noted to be confused with hx of Alzheimer's disease, therefore ? accuracy of information obtained. Reports good appetite PTA, not following any therapeutic diet. Confirms NKFA.

## 2022-07-25 NOTE — DIETITIAN INITIAL EVALUATION ADULT - NSFNSGIIOFT_GEN_A_CORE
Denies nausea, vomiting, diarrhea. Pt reports constipation, however pt with 5 BMs today thus far. Pt currently on bowel regimen (dulcolax, miralax).

## 2022-07-25 NOTE — DIETITIAN INITIAL EVALUATION ADULT - PERTINENT MEDS FT
MEDICATIONS  (STANDING):  amLODIPine   Tablet 10 milliGRAM(s) Oral daily  apixaban 5 milliGRAM(s) Oral two times a day  atorvastatin 80 milliGRAM(s) Oral at bedtime  bisacodyl 5 milliGRAM(s) Oral daily  chlorhexidine 2% Cloths 1 Application(s) Topical daily  donepezil 10 milliGRAM(s) Oral at bedtime  levothyroxine 100 MICROGram(s) Oral daily  melatonin 5 milliGRAM(s) Oral <User Schedule>  memantine 10 milliGRAM(s) Oral two times a day  metoprolol succinate ER 50 milliGRAM(s) Oral daily  mirtazapine 7.5 milliGRAM(s) Oral at bedtime  polyethylene glycol 3350 17 Gram(s) Oral daily  QUEtiapine 25 milliGRAM(s) Oral at bedtime    MEDICATIONS  (PRN):  acetaminophen     Tablet .. 650 milliGRAM(s) Oral every 6 hours PRN Temp greater or equal to 38C (100.4F), Mild Pain (1 - 3), Moderate Pain (4 - 6), Severe Pain (7 - 10)  aluminum hydroxide/magnesium hydroxide/simethicone Suspension 30 milliLiter(s) Oral every 4 hours PRN Dyspepsia  haloperidol     Tablet 2 milliGRAM(s) Oral every 12 hours PRN agitation  lidocaine   4% Patch 1 Patch Transdermal daily PRN pain  LORazepam     Tablet 0.5 milliGRAM(s) Oral daily PRN Agitation

## 2022-07-25 NOTE — PROGRESS NOTE ADULT - PROBLEM SELECTOR PLAN 1
acute ischemic stroke R MCA    - Mechanism is unclear, Embolic secondary to ESUS vs. hypercoagulable state secondary to COVID 19+   sp tPA   Eliquis/Statin  neuro checks  TTE - EF 70%, minimal AR   monitor on tele  orders per Stroke Team  PT/OT acute ischemic stroke R MCA    - Mechanism is unclear, Embolic secondary to ESUS vs. hypercoagulable state secondary to COVID 19+   sp tPA   Eliquis/Statin  neuro checks  TTE - EF 70%, minimal AR   monitor on tele - possible ILR as per neuro recs  orders per Stroke Team  PT/OT

## 2022-07-25 NOTE — PROGRESS NOTE ADULT - ASSESSMENT
82 y.o. M with PMH of thyroid ca s/p thyroidectomy in 2018, hyperthyroid, HTN, TIA (15-20 years ago), alzheimer's disease, and depression presented to [facility] ED due to L facial droop, L sided weakness and slurring of speech.

## 2022-07-25 NOTE — PROGRESS NOTE ADULT - ASSESSMENT
82 y.o. M with PMH of thyroid ca s/p thyroidectomy in 2018, hyperthyroid, HTN, TIA (15-20 years ago), alzheimer's disease, and depression presented to [facility] ED due to L facial droop, L sided weakness and slurring of speech. LKN 22:00 hr on 7/15. Neuro exam fluctuating but remains with L hemiparesis and severe dysarthria. NIHSS 11 -> NIHSS 6 (does not know month, age, dysarthric, LUE drift, LUE ataxic). mRS 0. tPA administered 5.4 mg IV x1 @ 23:41-42 hr on 7/15. Bolus 49 mg started at 23:43  hr on 7/15. tPA administered > 30 min due to aggressive HTN management with IV medications. He was not a thrombectomy candidate due to no LVO    Impression: L hemiparesis and dysarthria due to acute ischemic stroke R MCA . Mechanism is unclear, Embolic secondary to ESUS vs. hypercoagulable state secondary to COVID 19+     NEURO: No obvious aphasia appears apathetic, Neurologically without change, will d/c Seroquel PRN dose due to increased lethargy and will start ativan 0.5mg PRN for agitation.  CTH on 7/22 without change, Continue close monitoring for neurologic deterioration, permissive HTN followed by gradual normotension, HgA1C 6.1 %, - continue high intensity statin,  Lidocaine patches PRN for back pain. Physical therapy/OT appreciated and recommended AR. SLP eval appreciated.     ANTITHROMBOTIC THERAPY:  Eliquis given elevated D-Dimer (2086) hypercoagulable state secondary to Covid infection    PULMONARY:  CXR (07/18): No focal infiltrates. No pleural effusion or pneumothorax, will obtain repeat CXR and encourage incentive spirometer protecting airway, saturating well     CARDIOVASCULAR: TTE shows EF70%, unremarkable, cardiac monitoring: no events, continue Amlodipine to 10 mg daily and Metoprolol ER 50mg daily for BP management. Will need ILR to screen for occult arrythmia prior to discharge.,                              SBP goal: 110-160mmHg    GASTROINTESTINAL: passed dysphagia screen , aspiration precautions, s/p MBS on 7/18 with recommendations for minced and moist, seen on 7/21 and diet upgraded, tolerating well      Diet: regular solids with thin liquids    RENAL: s/p straight cath on 07/17 due limited urine output. good urine output, Hyponatremia Will continue to monitor closely.      Na Goal: Greater than 135     Rawls: No    HEMATOLOGY: Will likely need anticoagulation for 1 month due to elevated D-Dimer. LE doppler on 07/19 and on 07/24: negative for DVT     DVT ppx: no need as pt is on Eliquis    ENDO: h/o thyroid CA s/p thyroidectomy, postsurgical hypothyroidism: will continue Levothyroxine 100 mcg daily    ID: Covid +, afebrile, ID following: if fever will send blood cultures, Pt is on airborne/contact precautions, no indication for antivirals.    OTHER:  Depression, Dementia with sundowning at baseline. Restarted home meds including Seroquel 25 mg q hs, Mirtazepine 7.5 mg qhs, Donepezil 10 mg q hs, Namenda 10 mg BID. Agitated on 07/19/22 better with current medication regimen, no longer on restraints. Psych consult appreciated, Plan/goals  of care discussed with pt's daughter, Jordyn.     DISPOSITION: Acute Rehab once completed covid quarantine    CORE MEASURES:        Admission NIHSS: 11     TPA: YES       LDL/HDL: 108/61     Depression Screen:  P     Statin Therapy: Atorvastatin     Dysphagia Screen: PASS     Smoking  NO      Afib  NO     Stroke Education YES    Obtain screening lower extremity venous ultrasound in patients who meet 1 or more of the following criteria as patient is high risk for DVT/PE on admission:   [] History of DVT/PE  []Hypercoagulable states (Factor V Leiden, Cancer, OCP, etc. )  []Prolonged immobility (hemiplegia/hemiparesis/post operative or any other extended immobilization)  [] Transferred from outside facility (Rehab or Long term care)  [] Age </= to 50

## 2022-07-25 NOTE — DIETITIAN INITIAL EVALUATION ADULT - REASON FOR ADMISSION
Cerebral infarction    "82 y.o. M with PMH of thyroid ca s/p thyroidectomy in 2018, hyperthyroid, HTN, TIA (15-20 years ago), alzheimer's disease, and depression presented to [facility] ED due to L facial droop, L sided weakness and slurring of speech."

## 2022-07-25 NOTE — DIETITIAN INITIAL EVALUATION ADULT - ADD RECOMMEND
1) Continue diet free of therapeutic restrictions - advance texture as appropriate per SLP recommendations.   2) RD to add Diet Mighty Shakes 2x/day to optimize PO intake.   3) Monitor PO intake, GI tolerance, skin integrity, labs, weight, and bowel movement regularity.   4) Honor food preferences as feasible. Assist with meals PRN and encourage PO intake.  5) RD remains available upon request and will follow-up per protocol.

## 2022-07-25 NOTE — DIETITIAN INITIAL EVALUATION ADULT - NSFNSNUTRCHEWSWALLOWFT_GEN_A_CORE
Pt denies chewing/swallowing difficulty. Noted pt admitted for R MCA acute ischemic stroke. Seen by SLP, initially recommended for puree and moderately thick liquids 7/16. S/p MBS 7/18 with recommendations to upgrade to minced and moist with thin liquids. Pt most recently seen 7/20 with recommendation to upgrade to Regular texture and thin liquids.

## 2022-07-25 NOTE — PROGRESS NOTE ADULT - SUBJECTIVE AND OBJECTIVE BOX
THE PATIENT WAS SEEN AND EXAMINED BY ME WITH THE HOUSESTAFF AND STROKE TEAM DURING MORNING ROUNDS.   HPI:  82 y.o. M with PMH of thyroid ca s/p thyroidectomy in 2018, hyperthyroid, HTN, TIA (15-20 years ago), alzheimer's disease, and depression presented to [facility] ED due to L facial droop, L sided weakness and slurring of speech. LKN 22:00 hr on 7/15. Was asked by wife to take out trash and came back into the house ~22:15, started to have sx. Takes aspirin at home. Denies any toxic habits. No recent illnesses. Denies any headache, chest pain, numbness/tingling throughout the extremities. NIHSS 11 on admission. (He did not know month and age, L facial palsy, LUE some effort, LLE drift, mild aphasia, severe dysarthria), mRS 0.      SUBJECTIVE: No events overnight.  No new neurologic complaints.      acetaminophen     Tablet .. 650 milliGRAM(s) Oral every 6 hours PRN  aluminum hydroxide/magnesium hydroxide/simethicone Suspension 30 milliLiter(s) Oral every 4 hours PRN  amLODIPine   Tablet 10 milliGRAM(s) Oral daily  apixaban 5 milliGRAM(s) Oral two times a day  atorvastatin 80 milliGRAM(s) Oral at bedtime  bisacodyl 5 milliGRAM(s) Oral daily  chlorhexidine 2% Cloths 1 Application(s) Topical daily  donepezil 10 milliGRAM(s) Oral at bedtime  haloperidol     Tablet 2 milliGRAM(s) Oral every 12 hours PRN  levothyroxine 100 MICROGram(s) Oral daily  lidocaine   4% Patch 1 Patch Transdermal daily PRN  LORazepam     Tablet 0.5 milliGRAM(s) Oral daily PRN  melatonin 5 milliGRAM(s) Oral <User Schedule>  memantine 10 milliGRAM(s) Oral two times a day  metoprolol succinate ER 50 milliGRAM(s) Oral daily  mirtazapine 7.5 milliGRAM(s) Oral at bedtime  polyethylene glycol 3350 17 Gram(s) Oral daily  QUEtiapine 25 milliGRAM(s) Oral at bedtime      PHYSICAL EXAM:   Vital Signs Last 24 Hrs  T(C): 36.7 (25 Jul 2022 04:00), Max: 37.8 (25 Jul 2022 00:00)  T(F): 98 (25 Jul 2022 04:00), Max: 100 (25 Jul 2022 00:00)  HR: 52 (25 Jul 2022 08:00) (52 - 92)  BP: 136/61 (25 Jul 2022 08:00) (136/61 - 177/84)  BP(mean): 82 (25 Jul 2022 08:00) (82 - 111)  RR: 20 (25 Jul 2022 08:00) (19 - 25)  SpO2: 97% (25 Jul 2022 08:00) (97% - 99%)    Parameters below as of 25 Jul 2022 08:00  Patient On (Oxygen Delivery Method): room air        General: No acute distress  HEENT: EOM intact, visual fields full  Abdomen: Soft, nontender, nondistended   Extremities: No edema    NEUROLOGICAL EXAM:  Mental status: Eyes open, awake, apathetic, inattentive, oriented to person only, generates a few words only, long pauses when generating speech, requires repetitive prompting to generate single words, follows simple commands.  Cranial Nerves: flattening of left NLF, no nystagmus, moderate dysarthria,  tongue midline  Motor exam: Normal tone, no drift, moves all extremities equally  Sensation: Intact to light touch   Coordination/ Gait:  gait unsteady       LABS:                        10.3   5.79  )-----------( 127      ( 25 Jul 2022 06:07 )             30.5    07-25    132<L>  |  97  |  26<H>  ----------------------------<  98  3.7   |  22  |  0.97    Ca    9.0      25 Jul 2022 06:06          IMAGING: Reviewed by me.     CT Head No Cont (07.23.2022)  Evolving early subacute infarct in the ventral right frontoparietal   convexity with gyriform petechial hemorrhage is unchanged from CT of   07/18/2022 and MRI of 07/17/2022.  No new intracranial findings.    CT Brain 07.18.22: Right MCA infarct is again seen with some hemorrhagic transformation suspected.    MRI HEAD 07/17/22: Acute right MCA infarct.    CT BRAIN 07.16.22 Developing acute infarct in the right middle cerebral artery vascular territory, involving the ventral aspect of the right frontoparietal convexity.  No hemorrhagic conversion.    CTA Neck and brain 07.15.22 No proximal large vessel intracranial occlusion. Atherosclerotic calcifications contributing to moderate luminal stenosis of the proximal left internal carotid artery by NASCET criteria.    CT Perfusion 07.15.22 No core infarct identified. Penumbra of 10 mL localized to the right MCA territory.    CT BRAIN 07.15.22 No acute intracranial hemorrhage, mass effect, or midline shift.

## 2022-07-26 LAB — SARS-COV-2 RNA SPEC QL NAA+PROBE: SIGNIFICANT CHANGE UP

## 2022-07-26 PROCEDURE — 99233 SBSQ HOSP IP/OBS HIGH 50: CPT | Mod: FS

## 2022-07-26 RX ORDER — PHENYLEPHRINE-SHARK LIVER OIL-MINERAL OIL-PETROLATUM RECTAL OINTMENT
1 OINTMENT (GRAM) RECTAL DAILY
Refills: 0 | Status: DISCONTINUED | OUTPATIENT
Start: 2022-07-26 | End: 2022-07-27

## 2022-07-26 RX ADMIN — LIDOCAINE 1 PATCH: 4 CREAM TOPICAL at 02:00

## 2022-07-26 RX ADMIN — APIXABAN 5 MILLIGRAM(S): 2.5 TABLET, FILM COATED ORAL at 05:49

## 2022-07-26 RX ADMIN — Medication 650 MILLIGRAM(S): at 20:19

## 2022-07-26 RX ADMIN — POLYETHYLENE GLYCOL 3350 17 GRAM(S): 17 POWDER, FOR SOLUTION ORAL at 12:34

## 2022-07-26 RX ADMIN — PHENYLEPHRINE-SHARK LIVER OIL-MINERAL OIL-PETROLATUM RECTAL OINTMENT 1 APPLICATION(S): at 12:34

## 2022-07-26 RX ADMIN — AMLODIPINE BESYLATE 10 MILLIGRAM(S): 2.5 TABLET ORAL at 05:49

## 2022-07-26 RX ADMIN — HALOPERIDOL DECANOATE 2 MILLIGRAM(S): 100 INJECTION INTRAMUSCULAR at 10:44

## 2022-07-26 RX ADMIN — Medication 650 MILLIGRAM(S): at 04:58

## 2022-07-26 RX ADMIN — Medication 5 MILLIGRAM(S): at 12:34

## 2022-07-26 RX ADMIN — Medication 5 MILLIGRAM(S): at 20:19

## 2022-07-26 RX ADMIN — ATORVASTATIN CALCIUM 80 MILLIGRAM(S): 80 TABLET, FILM COATED ORAL at 22:11

## 2022-07-26 RX ADMIN — Medication 650 MILLIGRAM(S): at 04:25

## 2022-07-26 RX ADMIN — Medication 0.5 MILLIGRAM(S): at 05:50

## 2022-07-26 RX ADMIN — Medication 650 MILLIGRAM(S): at 21:00

## 2022-07-26 RX ADMIN — Medication 100 MICROGRAM(S): at 05:49

## 2022-07-26 RX ADMIN — QUETIAPINE FUMARATE 25 MILLIGRAM(S): 200 TABLET, FILM COATED ORAL at 22:11

## 2022-07-26 RX ADMIN — Medication 50 MILLIGRAM(S): at 05:49

## 2022-07-26 RX ADMIN — LIDOCAINE 1 PATCH: 4 CREAM TOPICAL at 05:54

## 2022-07-26 RX ADMIN — DONEPEZIL HYDROCHLORIDE 10 MILLIGRAM(S): 10 TABLET, FILM COATED ORAL at 22:12

## 2022-07-26 RX ADMIN — APIXABAN 5 MILLIGRAM(S): 2.5 TABLET, FILM COATED ORAL at 17:44

## 2022-07-26 RX ADMIN — MEMANTINE HYDROCHLORIDE 10 MILLIGRAM(S): 10 TABLET ORAL at 05:50

## 2022-07-26 RX ADMIN — MEMANTINE HYDROCHLORIDE 10 MILLIGRAM(S): 10 TABLET ORAL at 17:44

## 2022-07-26 RX ADMIN — MIRTAZAPINE 7.5 MILLIGRAM(S): 45 TABLET, ORALLY DISINTEGRATING ORAL at 22:11

## 2022-07-26 RX ADMIN — LIDOCAINE 1 PATCH: 4 CREAM TOPICAL at 17:27

## 2022-07-26 RX ADMIN — CHLORHEXIDINE GLUCONATE 1 APPLICATION(S): 213 SOLUTION TOPICAL at 22:46

## 2022-07-26 NOTE — PROVIDER CONTACT NOTE (OTHER) - ACTION/TREATMENT ORDERED:
As per PA give Haldol 1 mg IVPB .
Posey vest restraint and B/L wrist restraint in place.  Medicated for agitations.  placed on constant observation.  Pt safety maintained.
Pt medicated for aggression / agitation; See EMAR.  Apply posey vest restraint and B/L wrist restraints  to prevent harm.  Pts Daughter called to calm pt
pt daughter called with 3 attempts with no response, Haldol 1mg IV push ordered
NP will order Dopplers
[place]ques restraint  Hold Cardene for now
Prep H and continue to monitor, will pass on to day team.

## 2022-07-26 NOTE — PROGRESS NOTE ADULT - ASSESSMENT
82 y.o. M with PMH of thyroid ca s/p thyroidectomy in 2018, hyperthyroid, HTN, TIA (15-20 years ago), alzheimer's disease, and depression presented to [facility] ED due to L facial droop, L sided weakness and slurring of speech. LKN 22:00 hr on 7/15. Neuro exam fluctuating but remains with L hemiparesis and severe dysarthria. NIHSS 11 -> NIHSS 6 (does not know month, age, dysarthric, LUE drift, LUE ataxic). mRS 0. tPA administered 5.4 mg IV x1 @ 23:41-42 hr on 7/15. Bolus 49 mg started at 23:43  hr on 7/15. tPA administered > 30 min due to aggressive HTN management with IV medications. He was not a thrombectomy candidate due to no LVO    Impression: L hemiparesis and dysarthria due to acute ischemic stroke R MCA . Mechanism is unclear, Embolic secondary to ESUS vs. hypercoagulable state secondary to COVID 19+     NEURO: No obvious aphasia appears apathetic, Neurologically without change, will d/c Seroquel PRN dose due to increased lethargy and will start ativan 0.5mg PRN for agitation.  CTH on 7/22 without change, Continue close monitoring for neurologic deterioration, permissive HTN followed by gradual normotension, HgA1C 6.1 %, - continue high intensity statin,  Lidocaine patches PRN for back pain. Physical therapy/OT appreciated and recommended AR. SLP eval appreciated.     ANTITHROMBOTIC THERAPY:  Eliquis given elevated D-Dimer (2086) hypercoagulable state secondary to Covid infection    PULMONARY:  CXR (07/18): No focal infiltrates. No pleural effusion or pneumothorax, will obtain repeat CXR and encourage incentive spirometer protecting airway, saturating well     CARDIOVASCULAR: TTE shows EF70%, unremarkable, cardiac monitoring: no events, continue Amlodipine to 10 mg daily and Metoprolol ER 50mg daily for BP management. Will need ILR to screen for occult arrythmia prior to discharge.                              SBP goal: 110-160mmHg    GASTROINTESTINAL: passed dysphagia screen , aspiration precautions, s/p MBS on 7/18 with recommendations for minced and moist, seen on 7/21 and diet upgraded, tolerating well      Diet: regular solids with thin liquids    RENAL: s/p straight cath on 07/17 due limited urine output. good urine output, Hyponatremia Will continue to monitor closely.      Na Goal: Greater than 135     Rawls: No    HEMATOLOGY: Will likely need anticoagulation for 1 month due to elevated D-Dimer. LE doppler on 07/19 and on 07/24: negative for DVT     DVT ppx: no need as pt is on Eliquis    ENDO: h/o thyroid CA s/p thyroidectomy, postsurgical hypothyroidism: will continue Levothyroxine 100 mcg daily    ID: Covid +, afebrile, ID following: if fever will send blood cultures, Pt is on airborne/contact precautions, no indication for antivirals.    OTHER:  Depression, Dementia with sundowning at baseline. Restarted home meds including Seroquel 25 mg q hs, Mirtazepine 7.5 mg qhs, Donepezil 10 mg q hs, Namenda 10 mg BID. Agitated on 07/19/22 better with current medication regimen, no longer on restraints. Psych consult appreciated, Plan/goals  of care discussed with pt's daughter, Jordyn.     DISPOSITION: Acute Rehab once completed covid quarantine    CORE MEASURES:        Admission NIHSS: 11     TPA: YES       LDL/HDL: 108/61     Depression Screen:  P     Statin Therapy: Atorvastatin     Dysphagia Screen: PASS     Smoking  NO      Afib  NO     Stroke Education YES    Obtain screening lower extremity venous ultrasound in patients who meet 1 or more of the following criteria as patient is high risk for DVT/PE on admission:   [] History of DVT/PE  []Hypercoagulable states (Factor V Leiden, Cancer, OCP, etc. )  []Prolonged immobility (hemiplegia/hemiparesis/post operative or any other extended immobilization)  [] Transferred from outside facility (Rehab or Long term care)  [] Age </= to 50

## 2022-07-26 NOTE — PROVIDER CONTACT NOTE (OTHER) - SITUATION
pt is agitated, restless, fighting with staff
Pt is having bloody bowel movements
Pt very agitated wandering in  room unsteady gait. Pt refusing po meds and  cardiac monitoring
Pt Aox1-2, restless, constantly trying to get OOB. Neuro exam pt dysarthria fluctuating from mild to severe
Pt admitted for stroke.  Pt noted to be becoming increasingly aggressive towards staff member.
Pt admitted for stroke.  Pt noted to be becoming increasingly aggressive towards staff member.  Pt yelling at staff and attempting to hit staff.
pt complain of left calf pain 4/10

## 2022-07-26 NOTE — PROVIDER CONTACT NOTE (OTHER) - ASSESSMENT
Pt Aox1-2, restless, constantly trying to get OOB. Neuro exam pt dysarthria fluctuating from mild to severe
Pt admitted for stroke.  Pt noted to be becoming increasingly aggressive towards staff member.  Pt yelling at staff and attempting to hit staff.  Pt running out of room and attempting to elope.  Pt attempting to go to roommates side of room and disturbing other patients.
Pt admitted for stroke.  Pt noted to be becoming increasingly aggressive towards staff member.  Pt yelling at staff and attempting to hit staff.  Pt running out of room and attempting to elope.  Pt stating that RN "stole his pants" and screaming to call 911.  Pt uncooperative.
pt is agitated, restless, fighting with staff
Pt very agitated wandering in  room unsteady gait. Pt refusing po meds and  cardiac monitoring
pt complain of left calf pain 4/10
Pt is AxOx3 VSS and is having bloody bowel movements.

## 2022-07-26 NOTE — PROGRESS NOTE ADULT - PROBLEM SELECTOR PLAN 1
acute ischemic stroke R MCA    - Mechanism is unclear, Embolic secondary to ESUS vs. hypercoagulable state secondary to COVID 19+   sp tPA   Eliquis/Statin  neuro checks  TTE - EF 70%, minimal AR   monitor on tele - ILR as per neuro recs  orders per Stroke Team  PT/OT

## 2022-07-26 NOTE — PROVIDER CONTACT NOTE (OTHER) - BACKGROUND
Pt admitted for stroke
Pt admitted for stroke.
s/p TPA, Hx dementia
pt admitted with left side weakness, slurred speech, dx, Right MCA infarct
Pt very agitated wandering in  room unsteady gait. Pt refusing po meds and  cardiac monitoring
pt admitted with left side weakness and slurring speech
Pt admitted for cerebral infarction.

## 2022-07-26 NOTE — PROGRESS NOTE ADULT - SUBJECTIVE AND OBJECTIVE BOX
THE PATIENT WAS SEEN AND EXAMINED BY ME WITH THE HOUSESTAFF AND STROKE TEAM DURING MORNING ROUNDS.   HPI:  82 y.o. M with PMH of thyroid ca s/p thyroidectomy in 2018, hyperthyroid, HTN, TIA (15-20 years ago), alzheimer's disease, and depression presented to [facility] ED due to L facial droop, L sided weakness and slurring of speech. LKN 22:00 hr on 7/15. Was asked by wife to take out trash and came back into the house ~22:15, started to have sx. Takes aspirin at home. Denies any toxic habits. No recent illnesses. Denies any headache, chest pain, numbness/tingling throughout the extremities. NIHSS 11 on admission. (He did not know month and age, L facial palsy, LUE some effort, LLE drift, mild aphasia, severe dysarthria), mRS 0.        SUBJECTIVE: No events overnight.  No new neurologic complaints.      acetaminophen     Tablet .. 650 milliGRAM(s) Oral every 6 hours PRN  aluminum hydroxide/magnesium hydroxide/simethicone Suspension 30 milliLiter(s) Oral every 4 hours PRN  amLODIPine   Tablet 10 milliGRAM(s) Oral daily  apixaban 5 milliGRAM(s) Oral two times a day  atorvastatin 80 milliGRAM(s) Oral at bedtime  bisacodyl 5 milliGRAM(s) Oral daily  chlorhexidine 2% Cloths 1 Application(s) Topical daily  donepezil 10 milliGRAM(s) Oral at bedtime  haloperidol     Tablet 2 milliGRAM(s) Oral every 12 hours PRN  hemorrhoidal Ointment 1 Application(s) Rectal daily  levothyroxine 100 MICROGram(s) Oral daily  lidocaine   4% Patch 1 Patch Transdermal daily PRN  LORazepam     Tablet 0.5 milliGRAM(s) Oral daily PRN  melatonin 5 milliGRAM(s) Oral <User Schedule>  memantine 10 milliGRAM(s) Oral two times a day  metoprolol succinate ER 50 milliGRAM(s) Oral daily  mirtazapine 7.5 milliGRAM(s) Oral at bedtime  polyethylene glycol 3350 17 Gram(s) Oral daily  QUEtiapine 25 milliGRAM(s) Oral at bedtime      PHYSICAL EXAM:   Vital Signs Last 24 Hrs  T(C): 37.2 (26 Jul 2022 05:23), Max: 37.2 (26 Jul 2022 05:23)  T(F): 99 (26 Jul 2022 05:23), Max: 99 (26 Jul 2022 05:23)  HR: 69 (26 Jul 2022 05:23) (52 - 79)  BP: 144/60 (26 Jul 2022 05:23) (120/81 - 170/66)  BP(mean): 96 (25 Jul 2022 20:00) (82 - 96)  RR: 18 (26 Jul 2022 05:23) (16 - 23)  SpO2: 99% (26 Jul 2022 05:23) (97% - 100%)    Parameters below as of 26 Jul 2022 05:23  Patient On (Oxygen Delivery Method): room air        General: No acute distress  HEENT: EOM intact, visual fields full  Abdomen: Soft, nontender, nondistended   Extremities: No edema    NEUROLOGICAL EXAM:  Mental status: Eyes open, awake, apathetic, inattentive, oriented to person only, generates a few words only, long pauses when generating speech, requires repetitive prompting to generate single words, follows simple commands.  Cranial Nerves: flattening of left NLF, no nystagmus, moderate dysarthria,  tongue midline  Motor exam: Normal tone, no drift, moves all extremities equally  Sensation: Intact to light touch   Coordination/ Gait:  gait unsteady   LABS:                        10.3   5.79  )-----------( 127      ( 25 Jul 2022 06:07 )             30.5    07-25    132<L>  |  97  |  26<H>  ----------------------------<  98  3.7   |  22  |  0.97    Ca    9.0      25 Jul 2022 06:06          IMAGING: Reviewed by me.     CT Head No Cont (07.23.2022)  Evolving early subacute infarct in the ventral right frontoparietal   convexity with gyriform petechial hemorrhage is unchanged from CT of   07/18/2022 and MRI of 07/17/2022.  No new intracranial findings.    CT Brain 07.18.22: Right MCA infarct is again seen with some hemorrhagic transformation suspected.    MRI HEAD 07/17/22: Acute right MCA infarct.    CT BRAIN 07.16.22 Developing acute infarct in the right middle cerebral artery vascular territory, involving the ventral aspect of the right frontoparietal convexity.  No hemorrhagic conversion.    CTA Neck and brain 07.15.22 No proximal large vessel intracranial occlusion. Atherosclerotic calcifications contributing to moderate luminal stenosis of the proximal left internal carotid artery by NASCET criteria.    CT Perfusion 07.15.22 No core infarct identified. Penumbra of 10 mL localized to the right MCA territory.    CT BRAIN 07.15.22 No acute intracranial hemorrhage, mass effect, or midline shift.  THE PATIENT WAS SEEN AND EXAMINED BY ME WITH THE HOUSESTAFF AND STROKE TEAM DURING MORNING ROUNDS.   HPI:  82 y.o. M with PMH of thyroid ca s/p thyroidectomy in 2018, hyperthyroid, HTN, TIA (15-20 years ago), alzheimer's disease, and depression presented to [facility] ED due to L facial droop, L sided weakness and slurring of speech. LKN 22:00 hr on 7/15. Was asked by wife to take out trash and came back into the house ~22:15, started to have sx. Takes aspirin at home. Denies any toxic habits. No recent illnesses. Denies any headache, chest pain, numbness/tingling throughout the extremities. NIHSS 11 on admission. (He did not know month and age, L facial palsy, LUE some effort, LLE drift, mild aphasia, severe dysarthria), mRS 0.        SUBJECTIVE: No events overnight.  No new neurologic complaints.      acetaminophen     Tablet .. 650 milliGRAM(s) Oral every 6 hours PRN  aluminum hydroxide/magnesium hydroxide/simethicone Suspension 30 milliLiter(s) Oral every 4 hours PRN  amLODIPine   Tablet 10 milliGRAM(s) Oral daily  apixaban 5 milliGRAM(s) Oral two times a day  atorvastatin 80 milliGRAM(s) Oral at bedtime  bisacodyl 5 milliGRAM(s) Oral daily  chlorhexidine 2% Cloths 1 Application(s) Topical daily  donepezil 10 milliGRAM(s) Oral at bedtime  haloperidol     Tablet 2 milliGRAM(s) Oral every 12 hours PRN  hemorrhoidal Ointment 1 Application(s) Rectal daily  levothyroxine 100 MICROGram(s) Oral daily  lidocaine   4% Patch 1 Patch Transdermal daily PRN  LORazepam     Tablet 0.5 milliGRAM(s) Oral daily PRN  melatonin 5 milliGRAM(s) Oral <User Schedule>  memantine 10 milliGRAM(s) Oral two times a day  metoprolol succinate ER 50 milliGRAM(s) Oral daily  mirtazapine 7.5 milliGRAM(s) Oral at bedtime  polyethylene glycol 3350 17 Gram(s) Oral daily  QUEtiapine 25 milliGRAM(s) Oral at bedtime      PHYSICAL EXAM:   Vital Signs Last 24 Hrs  T(C): 37.2 (26 Jul 2022 05:23), Max: 37.2 (26 Jul 2022 05:23)  T(F): 99 (26 Jul 2022 05:23), Max: 99 (26 Jul 2022 05:23)  HR: 69 (26 Jul 2022 05:23) (52 - 79)  BP: 144/60 (26 Jul 2022 05:23) (120/81 - 170/66)  BP(mean): 96 (25 Jul 2022 20:00) (82 - 96)  RR: 18 (26 Jul 2022 05:23) (16 - 23)  SpO2: 99% (26 Jul 2022 05:23) (97% - 100%)    Parameters below as of 26 Jul 2022 05:23  Patient On (Oxygen Delivery Method): room air        General: No acute distress  HEENT: EOM intact, visual fields full  Abdomen: Soft, nontender, nondistended   Extremities: No edema    NEUROLOGICAL EXAM:  Mental status: Eyes open, awake, apathetic,oriented to person and age, generates a few words only, long pauses when generating speech, requires repetitive prompting to generate single words, follows simple commands.  Cranial Nerves: flattening of left NLF, no nystagmus, moderate dysarthria,  tongue midline  Motor exam: Normal tone, no drift, moves all extremities equally  Sensation: Intact to light touch   Coordination/ Gait:  gait unsteady   LABS:                        10.3   5.79  )-----------( 127      ( 25 Jul 2022 06:07 )             30.5    07-25    132<L>  |  97  |  26<H>  ----------------------------<  98  3.7   |  22  |  0.97    Ca    9.0      25 Jul 2022 06:06          IMAGING: Reviewed by me.     CT Head No Cont (07.23.2022)  Evolving early subacute infarct in the ventral right frontoparietal   convexity with gyriform petechial hemorrhage is unchanged from CT of   07/18/2022 and MRI of 07/17/2022.  No new intracranial findings.    CT Brain 07.18.22: Right MCA infarct is again seen with some hemorrhagic transformation suspected.    MRI HEAD 07/17/22: Acute right MCA infarct.    CT BRAIN 07.16.22 Developing acute infarct in the right middle cerebral artery vascular territory, involving the ventral aspect of the right frontoparietal convexity.  No hemorrhagic conversion.    CTA Neck and brain 07.15.22 No proximal large vessel intracranial occlusion. Atherosclerotic calcifications contributing to moderate luminal stenosis of the proximal left internal carotid artery by NASCET criteria.    CT Perfusion 07.15.22 No core infarct identified. Penumbra of 10 mL localized to the right MCA territory.    CT BRAIN 07.15.22 No acute intracranial hemorrhage, mass effect, or midline shift.

## 2022-07-26 NOTE — PROGRESS NOTE ADULT - SUBJECTIVE AND OBJECTIVE BOX
Subjective: Patient seen and examined. No new events except as noted.     REVIEW OF SYSTEMS:    CONSTITUTIONAL: + weakness, fevers or chills  EYES/ENT: No visual changes;  No vertigo or throat pain   NECK: No pain or stiffness  RESPIRATORY: No cough, wheezing, hemoptysis; No shortness of breath  CARDIOVASCULAR: No chest pain or palpitations  GASTROINTESTINAL: No abdominal or epigastric pain. No nausea, vomiting, or hematemesis; No diarrhea or constipation. No melena or hematochezia.  GENITOURINARY: No dysuria, frequency or hematuria  NEUROLOGICAL: No numbness or weakness  SKIN: No itching, burning, rashes, or lesions   All other review of systems is negative unless indicated above.    MEDICATIONS:  MEDICATIONS  (STANDING):  amLODIPine   Tablet 10 milliGRAM(s) Oral daily  apixaban 5 milliGRAM(s) Oral two times a day  atorvastatin 80 milliGRAM(s) Oral at bedtime  bisacodyl 5 milliGRAM(s) Oral daily  chlorhexidine 2% Cloths 1 Application(s) Topical daily  donepezil 10 milliGRAM(s) Oral at bedtime  hemorrhoidal Ointment 1 Application(s) Rectal daily  levothyroxine 100 MICROGram(s) Oral daily  melatonin 5 milliGRAM(s) Oral <User Schedule>  memantine 10 milliGRAM(s) Oral two times a day  metoprolol succinate ER 50 milliGRAM(s) Oral daily  mirtazapine 7.5 milliGRAM(s) Oral at bedtime  polyethylene glycol 3350 17 Gram(s) Oral daily  QUEtiapine 25 milliGRAM(s) Oral at bedtime    PHYSICAL EXAM:  T(C): 37.2 (07-26-22 @ 05:23), Max: 37.2 (07-26-22 @ 05:23)  HR: 69 (07-26-22 @ 05:23) (60 - 79)  BP: 144/60 (07-26-22 @ 05:23) (120/81 - 170/66)  RR: 18 (07-26-22 @ 05:23) (16 - 23)  SpO2: 99% (07-26-22 @ 05:23) (98% - 100%)  Wt(kg): --  I&O's Summary    25 Jul 2022 07:01  -  26 Jul 2022 07:00  --------------------------------------------------------  IN: 690 mL / OUT: 450 mL / NET: 240 mL    Appearance: NAD  HEENT: Normal oral mucosa, PERRL, EOMI	  Lymphatic: No lymphadenopathy , no edema  Cardiovascular: Normal S1 S2, No JVD, No murmurs , Peripheral pulses palpable 2+ bilaterally  Respiratory: Lungs clear to auscultation, normal effort 	  Gastrointestinal:  Soft, Non-tender, + BS	  Skin: No rashes, No ecchymoses, No cyanosis, warm to touch  NEUROLOGICAL EXAM:  Mental status: Eyes open, awake, apathetic, inattentive, oriented to person only, generates a few words only, long pauses when generating speech, requires repetitive prompting to generate single words, follows simple commands.  Cranial Nerves: flattening of left NLF, no nystagmus, moderate dysarthria,  tongue midline  Motor exam: Normal tone, no drift, moves all extremities equally  Sensation: Intact to light touch   Coordination/ Gait:  gait unsteady   Ext: No edema    LABS:    CARDIAC MARKERS:                       10.3   5.79  )-----------( 127      ( 25 Jul 2022 06:07 )             30.5     07-25    132<L>  |  97  |  26<H>  ----------------------------<  98  3.7   |  22  |  0.97    Ca    9.0      25 Jul 2022 06:06    proBNP:   Lipid Profile:   HgA1c:   TSH:     TELEMETRY:  SR 60-80    ECG:  	  RADIOLOGY:   DIAGNOSTIC TESTING:  [ ] Echocardiogram:  [ ]  Catheterization:  [ ] Stress Test:    OTHER:

## 2022-07-26 NOTE — PROGRESS NOTE ADULT - ASSESSMENT
82 y.o. M with PMH of thyroid ca s/p thyroidectomy in 2018, hyperthyroid, HTN, TIA (15-20 years ago), alzheimer's disease, and depression presented to [facility] ED due to L facial droop, L sided weakness and slurring of speech.

## 2022-07-27 ENCOUNTER — TRANSCRIPTION ENCOUNTER (OUTPATIENT)
Age: 82
End: 2022-07-27

## 2022-07-27 ENCOUNTER — INPATIENT (INPATIENT)
Facility: HOSPITAL | Age: 82
LOS: 9 days | Discharge: HOME HEALTH | DRG: 949 | End: 2022-08-06
Attending: STUDENT IN AN ORGANIZED HEALTH CARE EDUCATION/TRAINING PROGRAM | Admitting: STUDENT IN AN ORGANIZED HEALTH CARE EDUCATION/TRAINING PROGRAM
Payer: MEDICARE

## 2022-07-27 VITALS
DIASTOLIC BLOOD PRESSURE: 65 MMHG | OXYGEN SATURATION: 99 % | TEMPERATURE: 98 F | HEART RATE: 66 BPM | SYSTOLIC BLOOD PRESSURE: 158 MMHG | WEIGHT: 134.92 LBS | HEIGHT: 64 IN | RESPIRATION RATE: 17 BRPM

## 2022-07-27 VITALS
DIASTOLIC BLOOD PRESSURE: 58 MMHG | SYSTOLIC BLOOD PRESSURE: 124 MMHG | RESPIRATION RATE: 18 BRPM | TEMPERATURE: 98 F | OXYGEN SATURATION: 98 % | HEART RATE: 68 BPM

## 2022-07-27 DIAGNOSIS — I69.318 OTHER SYMPTOMS AND SIGNS INVOLVING COGNITIVE FUNCTIONS FOLLOWING CEREBRAL INFARCTION: ICD-10-CM

## 2022-07-27 DIAGNOSIS — Z86.16 PERSONAL HISTORY OF COVID-19: ICD-10-CM

## 2022-07-27 DIAGNOSIS — G30.9 ALZHEIMER'S DISEASE, UNSPECIFIED: ICD-10-CM

## 2022-07-27 DIAGNOSIS — E87.1 HYPO-OSMOLALITY AND HYPONATREMIA: ICD-10-CM

## 2022-07-27 DIAGNOSIS — I63.411 CEREBRAL INFARCTION DUE TO EMBOLISM OF RIGHT MIDDLE CEREBRAL ARTERY: ICD-10-CM

## 2022-07-27 DIAGNOSIS — G47.00 INSOMNIA, UNSPECIFIED: ICD-10-CM

## 2022-07-27 DIAGNOSIS — F02.80 DEMENTIA IN OTHER DISEASES CLASSIFIED ELSEWHERE, UNSPECIFIED SEVERITY, WITHOUT BEHAVIORAL DISTURBANCE, PSYCHOTIC DISTURBANCE, MOOD DISTURBANCE, AND ANXIETY: ICD-10-CM

## 2022-07-27 DIAGNOSIS — I63.9 CEREBRAL INFARCTION, UNSPECIFIED: ICD-10-CM

## 2022-07-27 DIAGNOSIS — F32.9 MAJOR DEPRESSIVE DISORDER, SINGLE EPISODE, UNSPECIFIED: ICD-10-CM

## 2022-07-27 DIAGNOSIS — R26.9 UNSPECIFIED ABNORMALITIES OF GAIT AND MOBILITY: ICD-10-CM

## 2022-07-27 DIAGNOSIS — I69.391 DYSPHAGIA FOLLOWING CEREBRAL INFARCTION: ICD-10-CM

## 2022-07-27 DIAGNOSIS — Z51.89 ENCOUNTER FOR OTHER SPECIFIED AFTERCARE: ICD-10-CM

## 2022-07-27 DIAGNOSIS — Z85.850 PERSONAL HISTORY OF MALIGNANT NEOPLASM OF THYROID: ICD-10-CM

## 2022-07-27 DIAGNOSIS — Z79.01 LONG TERM (CURRENT) USE OF ANTICOAGULANTS: ICD-10-CM

## 2022-07-27 DIAGNOSIS — I69.322 DYSARTHRIA FOLLOWING CEREBRAL INFARCTION: ICD-10-CM

## 2022-07-27 DIAGNOSIS — I10 ESSENTIAL (PRIMARY) HYPERTENSION: ICD-10-CM

## 2022-07-27 DIAGNOSIS — M10.9 GOUT, UNSPECIFIED: ICD-10-CM

## 2022-07-27 DIAGNOSIS — D68.69 OTHER THROMBOPHILIA: ICD-10-CM

## 2022-07-27 LAB
ANION GAP SERPL CALC-SCNC: 15 MMOL/L — SIGNIFICANT CHANGE UP (ref 5–17)
BUN SERPL-MCNC: 22 MG/DL — SIGNIFICANT CHANGE UP (ref 7–23)
CALCIUM SERPL-MCNC: 9.3 MG/DL — SIGNIFICANT CHANGE UP (ref 8.4–10.5)
CHLORIDE SERPL-SCNC: 97 MMOL/L — SIGNIFICANT CHANGE UP (ref 96–108)
CO2 SERPL-SCNC: 21 MMOL/L — LOW (ref 22–31)
CREAT SERPL-MCNC: 0.83 MG/DL — SIGNIFICANT CHANGE UP (ref 0.5–1.3)
EGFR: 86 ML/MIN/1.73M2 — SIGNIFICANT CHANGE UP
GLUCOSE SERPL-MCNC: 101 MG/DL — HIGH (ref 70–99)
POTASSIUM SERPL-MCNC: 4 MMOL/L — SIGNIFICANT CHANGE UP (ref 3.5–5.3)
POTASSIUM SERPL-SCNC: 4 MMOL/L — SIGNIFICANT CHANGE UP (ref 3.5–5.3)
SODIUM SERPL-SCNC: 133 MMOL/L — LOW (ref 135–145)

## 2022-07-27 PROCEDURE — 80048 BASIC METABOLIC PNL TOTAL CA: CPT

## 2022-07-27 PROCEDURE — 97535 SELF CARE MNGMENT TRAINING: CPT

## 2022-07-27 PROCEDURE — 82435 ASSAY OF BLOOD CHLORIDE: CPT

## 2022-07-27 PROCEDURE — 92526 ORAL FUNCTION THERAPY: CPT

## 2022-07-27 PROCEDURE — 82962 GLUCOSE BLOOD TEST: CPT

## 2022-07-27 PROCEDURE — 82947 ASSAY GLUCOSE BLOOD QUANT: CPT

## 2022-07-27 PROCEDURE — 85025 COMPLETE CBC W/AUTO DIFF WBC: CPT

## 2022-07-27 PROCEDURE — 74230 X-RAY XM SWLNG FUNCJ C+: CPT

## 2022-07-27 PROCEDURE — 93005 ELECTROCARDIOGRAM TRACING: CPT

## 2022-07-27 PROCEDURE — 97530 THERAPEUTIC ACTIVITIES: CPT

## 2022-07-27 PROCEDURE — 85014 HEMATOCRIT: CPT

## 2022-07-27 PROCEDURE — 81001 URINALYSIS AUTO W/SCOPE: CPT

## 2022-07-27 PROCEDURE — 87040 BLOOD CULTURE FOR BACTERIA: CPT

## 2022-07-27 PROCEDURE — 84132 ASSAY OF SERUM POTASSIUM: CPT

## 2022-07-27 PROCEDURE — 80053 COMPREHEN METABOLIC PANEL: CPT

## 2022-07-27 PROCEDURE — 33285 INSJ SUBQ CAR RHYTHM MNTR: CPT

## 2022-07-27 PROCEDURE — 85730 THROMBOPLASTIN TIME PARTIAL: CPT

## 2022-07-27 PROCEDURE — 82803 BLOOD GASES ANY COMBINATION: CPT

## 2022-07-27 PROCEDURE — 93970 EXTREMITY STUDY: CPT

## 2022-07-27 PROCEDURE — 85027 COMPLETE CBC AUTOMATED: CPT

## 2022-07-27 PROCEDURE — 93321 DOPPLER ECHO F-UP/LMTD STD: CPT

## 2022-07-27 PROCEDURE — 85610 PROTHROMBIN TIME: CPT

## 2022-07-27 PROCEDURE — 84484 ASSAY OF TROPONIN QUANT: CPT

## 2022-07-27 PROCEDURE — 70551 MRI BRAIN STEM W/O DYE: CPT | Mod: QQ

## 2022-07-27 PROCEDURE — 92610 EVALUATE SWALLOWING FUNCTION: CPT

## 2022-07-27 PROCEDURE — 0042T: CPT | Mod: MA

## 2022-07-27 PROCEDURE — 87640 STAPH A DNA AMP PROBE: CPT

## 2022-07-27 PROCEDURE — 99232 SBSQ HOSP IP/OBS MODERATE 35: CPT

## 2022-07-27 PROCEDURE — 84295 ASSAY OF SERUM SODIUM: CPT

## 2022-07-27 PROCEDURE — 83036 HEMOGLOBIN GLYCOSYLATED A1C: CPT

## 2022-07-27 PROCEDURE — 97161 PT EVAL LOW COMPLEX 20 MIN: CPT

## 2022-07-27 PROCEDURE — 92611 MOTION FLUOROSCOPY/SWALLOW: CPT

## 2022-07-27 PROCEDURE — 99233 SBSQ HOSP IP/OBS HIGH 50: CPT | Mod: FS

## 2022-07-27 PROCEDURE — U0003: CPT

## 2022-07-27 PROCEDURE — 96374 THER/PROPH/DIAG INJ IV PUSH: CPT

## 2022-07-27 PROCEDURE — 97110 THERAPEUTIC EXERCISES: CPT

## 2022-07-27 PROCEDURE — 85018 HEMOGLOBIN: CPT

## 2022-07-27 PROCEDURE — 70498 CT ANGIOGRAPHY NECK: CPT | Mod: MA

## 2022-07-27 PROCEDURE — 97166 OT EVAL MOD COMPLEX 45 MIN: CPT

## 2022-07-27 PROCEDURE — 99223 1ST HOSP IP/OBS HIGH 75: CPT

## 2022-07-27 PROCEDURE — 83605 ASSAY OF LACTIC ACID: CPT

## 2022-07-27 PROCEDURE — 70450 CT HEAD/BRAIN W/O DYE: CPT | Mod: MA

## 2022-07-27 PROCEDURE — 80061 LIPID PANEL: CPT

## 2022-07-27 PROCEDURE — 99285 EMERGENCY DEPT VISIT HI MDM: CPT

## 2022-07-27 PROCEDURE — 92523 SPEECH SOUND LANG COMPREHEN: CPT

## 2022-07-27 PROCEDURE — 36415 COLL VENOUS BLD VENIPUNCTURE: CPT

## 2022-07-27 PROCEDURE — 93308 TTE F-UP OR LMTD: CPT

## 2022-07-27 PROCEDURE — 87641 MR-STAPH DNA AMP PROBE: CPT

## 2022-07-27 PROCEDURE — 85384 FIBRINOGEN ACTIVITY: CPT

## 2022-07-27 PROCEDURE — 82330 ASSAY OF CALCIUM: CPT

## 2022-07-27 PROCEDURE — 87635 SARS-COV-2 COVID-19 AMP PRB: CPT

## 2022-07-27 PROCEDURE — 70496 CT ANGIOGRAPHY HEAD: CPT | Mod: MA

## 2022-07-27 PROCEDURE — U0005: CPT

## 2022-07-27 PROCEDURE — 82565 ASSAY OF CREATININE: CPT

## 2022-07-27 PROCEDURE — C1764: CPT

## 2022-07-27 PROCEDURE — 71045 X-RAY EXAM CHEST 1 VIEW: CPT

## 2022-07-27 PROCEDURE — 85379 FIBRIN DEGRADATION QUANT: CPT

## 2022-07-27 PROCEDURE — 97116 GAIT TRAINING THERAPY: CPT

## 2022-07-27 RX ORDER — QUETIAPINE FUMARATE 200 MG/1
25 TABLET, FILM COATED ORAL AT BEDTIME
Refills: 0 | Status: DISCONTINUED | OUTPATIENT
Start: 2022-07-27 | End: 2022-07-30

## 2022-07-27 RX ORDER — METOPROLOL TARTRATE 50 MG
1 TABLET ORAL
Qty: 0 | Refills: 0 | DISCHARGE
Start: 2022-07-27

## 2022-07-27 RX ORDER — ATORVASTATIN CALCIUM 80 MG/1
1 TABLET, FILM COATED ORAL
Qty: 0 | Refills: 0 | DISCHARGE
Start: 2022-07-27

## 2022-07-27 RX ORDER — MEMANTINE HYDROCHLORIDE 10 MG/1
10 TABLET ORAL
Refills: 0 | Status: DISCONTINUED | OUTPATIENT
Start: 2022-07-27 | End: 2022-08-06

## 2022-07-27 RX ORDER — LANOLIN ALCOHOL/MO/W.PET/CERES
6 CREAM (GRAM) TOPICAL AT BEDTIME
Refills: 0 | Status: DISCONTINUED | OUTPATIENT
Start: 2022-07-27 | End: 2022-07-29

## 2022-07-27 RX ORDER — QUETIAPINE FUMARATE 200 MG/1
1 TABLET, FILM COATED ORAL
Qty: 0 | Refills: 0 | DISCHARGE
Start: 2022-07-27

## 2022-07-27 RX ORDER — POLYETHYLENE GLYCOL 3350 17 G/17G
17 POWDER, FOR SOLUTION ORAL
Qty: 0 | Refills: 0 | DISCHARGE
Start: 2022-07-27

## 2022-07-27 RX ORDER — DONEPEZIL HYDROCHLORIDE 10 MG/1
1 TABLET, FILM COATED ORAL
Qty: 0 | Refills: 0 | DISCHARGE
Start: 2022-07-27

## 2022-07-27 RX ORDER — METOPROLOL TARTRATE 50 MG
50 TABLET ORAL DAILY
Refills: 0 | Status: DISCONTINUED | OUTPATIENT
Start: 2022-07-28 | End: 2022-08-06

## 2022-07-27 RX ORDER — MIRTAZAPINE 45 MG/1
1 TABLET, ORALLY DISINTEGRATING ORAL
Qty: 0 | Refills: 0 | DISCHARGE
Start: 2022-07-27

## 2022-07-27 RX ORDER — LEVOTHYROXINE SODIUM 125 MCG
100 TABLET ORAL DAILY
Refills: 0 | Status: DISCONTINUED | OUTPATIENT
Start: 2022-07-28 | End: 2022-08-06

## 2022-07-27 RX ORDER — LIDOCAINE 4 G/100G
1 CREAM TOPICAL DAILY
Refills: 0 | Status: DISCONTINUED | OUTPATIENT
Start: 2022-07-28 | End: 2022-08-03

## 2022-07-27 RX ORDER — PETROLATUM,WHITE
1 JELLY (GRAM) TOPICAL DAILY
Refills: 0 | Status: DISCONTINUED | OUTPATIENT
Start: 2022-07-27 | End: 2022-08-06

## 2022-07-27 RX ORDER — ATORVASTATIN CALCIUM 80 MG/1
80 TABLET, FILM COATED ORAL AT BEDTIME
Refills: 0 | Status: DISCONTINUED | OUTPATIENT
Start: 2022-07-27 | End: 2022-08-06

## 2022-07-27 RX ORDER — PHENYLEPHRINE-SHARK LIVER OIL-MINERAL OIL-PETROLATUM RECTAL OINTMENT
1 OINTMENT (GRAM) RECTAL DAILY
Refills: 0 | Status: DISCONTINUED | OUTPATIENT
Start: 2022-07-27 | End: 2022-08-06

## 2022-07-27 RX ORDER — ACETAMINOPHEN 500 MG
650 TABLET ORAL EVERY 6 HOURS
Refills: 0 | Status: DISCONTINUED | OUTPATIENT
Start: 2022-07-27 | End: 2022-08-06

## 2022-07-27 RX ORDER — APIXABAN 2.5 MG/1
1 TABLET, FILM COATED ORAL
Qty: 0 | Refills: 0 | DISCHARGE
Start: 2022-07-27

## 2022-07-27 RX ORDER — LANOLIN ALCOHOL/MO/W.PET/CERES
1 CREAM (GRAM) TOPICAL
Qty: 0 | Refills: 0 | DISCHARGE
Start: 2022-07-27

## 2022-07-27 RX ORDER — DONEPEZIL HYDROCHLORIDE 10 MG/1
10 TABLET, FILM COATED ORAL AT BEDTIME
Refills: 0 | Status: DISCONTINUED | OUTPATIENT
Start: 2022-07-27 | End: 2022-08-02

## 2022-07-27 RX ORDER — APIXABAN 2.5 MG/1
5 TABLET, FILM COATED ORAL
Refills: 0 | Status: DISCONTINUED | OUTPATIENT
Start: 2022-07-27 | End: 2022-08-06

## 2022-07-27 RX ORDER — POLYETHYLENE GLYCOL 3350 17 G/17G
17 POWDER, FOR SOLUTION ORAL DAILY
Refills: 0 | Status: DISCONTINUED | OUTPATIENT
Start: 2022-07-27 | End: 2022-08-06

## 2022-07-27 RX ORDER — FAMOTIDINE 10 MG/ML
20 INJECTION INTRAVENOUS DAILY
Refills: 0 | Status: DISCONTINUED | OUTPATIENT
Start: 2022-07-28 | End: 2022-08-06

## 2022-07-27 RX ORDER — AMLODIPINE BESYLATE 2.5 MG/1
1 TABLET ORAL
Qty: 0 | Refills: 0 | DISCHARGE
Start: 2022-07-27

## 2022-07-27 RX ORDER — HALOPERIDOL DECANOATE 100 MG/ML
1 INJECTION INTRAMUSCULAR
Qty: 0 | Refills: 0 | DISCHARGE
Start: 2022-07-27

## 2022-07-27 RX ORDER — MEMANTINE HYDROCHLORIDE 10 MG/1
1 TABLET ORAL
Qty: 0 | Refills: 0 | DISCHARGE
Start: 2022-07-27

## 2022-07-27 RX ORDER — MIRTAZAPINE 45 MG/1
7.5 TABLET, ORALLY DISINTEGRATING ORAL AT BEDTIME
Refills: 0 | Status: DISCONTINUED | OUTPATIENT
Start: 2022-07-27 | End: 2022-08-06

## 2022-07-27 RX ORDER — AMLODIPINE BESYLATE 2.5 MG/1
10 TABLET ORAL DAILY
Refills: 0 | Status: DISCONTINUED | OUTPATIENT
Start: 2022-07-28 | End: 2022-08-06

## 2022-07-27 RX ORDER — LEVOTHYROXINE SODIUM 125 MCG
1 TABLET ORAL
Qty: 0 | Refills: 0 | DISCHARGE
Start: 2022-07-27

## 2022-07-27 RX ADMIN — APIXABAN 5 MILLIGRAM(S): 2.5 TABLET, FILM COATED ORAL at 18:28

## 2022-07-27 RX ADMIN — ATORVASTATIN CALCIUM 80 MILLIGRAM(S): 80 TABLET, FILM COATED ORAL at 22:06

## 2022-07-27 RX ADMIN — HALOPERIDOL DECANOATE 2 MILLIGRAM(S): 100 INJECTION INTRAMUSCULAR at 00:22

## 2022-07-27 RX ADMIN — Medication 100 MICROGRAM(S): at 05:46

## 2022-07-27 RX ADMIN — QUETIAPINE FUMARATE 25 MILLIGRAM(S): 200 TABLET, FILM COATED ORAL at 22:06

## 2022-07-27 RX ADMIN — Medication 6 MILLIGRAM(S): at 22:10

## 2022-07-27 RX ADMIN — MEMANTINE HYDROCHLORIDE 10 MILLIGRAM(S): 10 TABLET ORAL at 18:27

## 2022-07-27 RX ADMIN — Medication 50 MILLIGRAM(S): at 05:45

## 2022-07-27 RX ADMIN — AMLODIPINE BESYLATE 10 MILLIGRAM(S): 2.5 TABLET ORAL at 05:46

## 2022-07-27 RX ADMIN — MEMANTINE HYDROCHLORIDE 10 MILLIGRAM(S): 10 TABLET ORAL at 05:45

## 2022-07-27 RX ADMIN — APIXABAN 5 MILLIGRAM(S): 2.5 TABLET, FILM COATED ORAL at 05:45

## 2022-07-27 RX ADMIN — MIRTAZAPINE 7.5 MILLIGRAM(S): 45 TABLET, ORALLY DISINTEGRATING ORAL at 22:06

## 2022-07-27 RX ADMIN — PHENYLEPHRINE-SHARK LIVER OIL-MINERAL OIL-PETROLATUM RECTAL OINTMENT 1 APPLICATION(S): at 12:54

## 2022-07-27 RX ADMIN — Medication 0.5 MILLIGRAM(S): at 05:45

## 2022-07-27 RX ADMIN — DONEPEZIL HYDROCHLORIDE 10 MILLIGRAM(S): 10 TABLET, FILM COATED ORAL at 22:07

## 2022-07-27 NOTE — DISCHARGE NOTE PROVIDER - NSDCCPCAREPLAN_GEN_ALL_CORE_FT
PRINCIPAL DISCHARGE DIAGNOSIS  Diagnosis: Stroke  Assessment and Plan of Treatment: Please follow up with neurologist in after being discharged from rehab. Continue taking medications as prescribed. Monitor your blood pressure. Reduce fat, cholesterol and salt in your diet. Increase intake of fruits and vegetables. Limit alcohol to minimum and do not smoke. You may be at risk for falling, make changes to your home to help you walk easier. Keep up to date on vaccinations.  If you experience any symptoms of facial drooping, slurred speech, arm or leg weakness, severe headache, vision changes or any worsening symptoms, notify provider immediatley and return to ER.

## 2022-07-27 NOTE — CONSULT NOTE ADULT - SUBJECTIVE AND OBJECTIVE BOX
EP Attending    HISTORY OF PRESENT ILLNESS: HPI:  82 y.o. M with PMH of thyroid ca s/p thyroidectomy in 2018, hyperthyroid, HTN, TIA (15-20 years ago), alzheimer's disease, and depression presented to [facility] ED due to L facial droop, L sided weakness and slurring of speech. LKN 22:00 hr on 7/15. Was asked by wife to take out trash and came back into the house ~22:15, started to have sx. Takes aspirin at home. Denies any toxic habits. No recent illnesses. Denies any headache, chest pain, numbness/tingling throughout the extremities.     NIHSS 11 for does not know month and age, L facial palsy, LUE some effort, LLE drift, mild aphasia, severe dysarthria  mRS 0 (15 Jul 2022 23:45)    Mr Holt speaks mostly Maria, has dementia, a prior TIA, and in the setting of his recent stroke has slurred speech and depressed level of attention.    He is not able to provide an adequate HPI/ROS due to mental status.  I discussed the plan of care with his daughter who is also a physician, re: long term heart rhythm monitoring to diagnose AFib after unexplained stroke.  The likelihood of discovering AFib is ~10% per year, per the CRYSTAL-AF trial. The risk of implanting a loop recorder is <1% for infection, and it is a routine procedure.  She has provided informed consent by telephone, witnessed by our floor staff, and we plan to proceed today.    PAST MEDICAL & SURGICAL HISTORY:  Depressive disorder, not elsewhere classified  Hypertension  CVA (cerebral vascular accident)  No significant past surgical history        MEDICATIONS  (STANDING):  amLODIPine   Tablet 10 milliGRAM(s) Oral daily  apixaban 5 milliGRAM(s) Oral two times a day  atorvastatin 80 milliGRAM(s) Oral at bedtime  bisacodyl 5 milliGRAM(s) Oral daily  chlorhexidine 2% Cloths 1 Application(s) Topical daily  donepezil 10 milliGRAM(s) Oral at bedtime  hemorrhoidal Ointment 1 Application(s) Rectal daily  levothyroxine 100 MICROGram(s) Oral daily  melatonin 5 milliGRAM(s) Oral <User Schedule>  memantine 10 milliGRAM(s) Oral two times a day  metoprolol succinate ER 50 milliGRAM(s) Oral daily  mirtazapine 7.5 milliGRAM(s) Oral at bedtime  polyethylene glycol 3350 17 Gram(s) Oral daily  QUEtiapine 25 milliGRAM(s) Oral at bedtime      Allergies    No Known Allergies    Intolerances    FAMILY HISTORY:    Non-contributary for premature coronary disease or sudden cardiac death    SOCIAL HISTORY:    [x ] Non-smoker  [ ] Smoker  [ ] Alcohol      PHYSICAL EXAM:  T(C): 36.6 (07-27-22 @ 09:11), Max: 37.4 (07-26-22 @ 21:59)  HR: 65 (07-27-22 @ 09:11) (60 - 88)  BP: 154/63 (07-27-22 @ 09:11) (125/62 - 161/70)  RR: 18 (07-27-22 @ 09:11) (17 - 18)  SpO2: 97% (07-27-22 @ 09:11) (97% - 98%)  Wt(kg): --    Appearance: elderly man in no acute distress  HEENT:   Normal oral mucosa, PERRL, EOMI, facial droop noted	  Lymphatic: No lymphadenopathy , no edema  Cardiovascular: Normal S1 S2, No JVD, No murmurs , Peripheral pulses palpable 2+ bilaterally  Respiratory: Lungs clear to auscultation, normal effort 	  Gastrointestinal:  Soft, Non-tender, + BS	  Skin: No rashes, No ecchymoses, No cyanosis, warm to touch  Musculoskeletal: Normal range of motion, normal strength  Psychiatry:  Mood and affect unable to be determined as he is sleepy.    TELEMETRY: NSR	    ECG: NSR 	    Echo:  < from: Limited Transthoracic Echo (07.18.22 @ 14:25) >  Dimensions:    Normal Values:  LA:            2.0 - 4.0 cm  Ao:            2.0 - 3.8 cm  SEPTUM:        0.6 - 1.2 cm  PWT:           0.6 - 1.1 cm  LVIDd:         3.0 - 5.6 cm  LVIDs:         1.8 - 4.0 cm  EF (Visual Estimate): 70 %  Doppler Peak Velocity (m/sec): AoV=1.6  ------------------------------------------------------------------------  Observations:  Mitral Valve: Normal mitral valve.  Aortic Valve/Aorta: Calcified aortic valve with normal  opening. Minimal aortic regurgitation.  Normal aortic root, aortic arch and descending thoracic  aorta.  Left Atrium: Normal left atrium.  Left Ventricle: Normal left ventricular internal dimensions  and wall thicknesses.  Normal left ventricular systolic function. No segmental  wall motion abnormalities.  Impaired LV-relaxation with normal filling pressure.  Right Heart: Normal right atrium. Normal right ventricular  size and systolic function.  Normal tricuspid valve. Normal pulmonic valve.  Pericardium/Pleura: Normal pericardium with no pericardial  effusion.  Hemodynamic: No evidnece of pulmonary hypertension.  Agitated saline injection demonstrates no evidence of a  patent foramen ovale.    < end of copied text >    CT Head No Cont (07.23.2022)  Evolving early subacute infarct in the ventral right frontoparietal   convexity with gyriform petechial hemorrhage is unchanged from CT of   07/18/2022 and MRI of 07/17/2022.  No new intracranial findings.    CT Brain 07.18.22: Right MCA infarct is again seen with some hemorrhagic transformation suspected.    MRI HEAD 07/17/22: Acute right MCA infarct.    CT BRAIN 07.16.22 Developing acute infarct in the right middle cerebral artery vascular territory, involving the ventral aspect of the right frontoparietal convexity.  No hemorrhagic conversion.    CTA Neck and brain 07.15.22 No proximal large vessel intracranial occlusion. Atherosclerotic calcifications contributing to moderate luminal stenosis of the proximal left internal carotid artery by NASCET criteria.    CT Perfusion 07.15.22 No core infarct identified. Penumbra of 10 mL localized to the right MCA territory.    CT BRAIN 07.15.22 No acute intracranial hemorrhage, mass effect, or midline shift.     ASSESSMENT/PLAN: 	82y Male with embolic stroke, treated w/ tPA.  Hx prior TIA.  A loop recorder is recommended for long term surveillance for AFib after second cerebrovascular event.    He had a large R-MCA stroke, and cardioembolism is likely. His carotids show no significant occlusion on the right side.  we will proceed with ILR today, and he can go to rehab shortly after.  Followup of ILR data with Dr Cooper.      Emerson Boucher M.D.  Cardiac Electrophysiology    office [phone]  pager [phone]

## 2022-07-27 NOTE — PROVIDER CONTACT NOTE (OTHER) - REASON
Pt is having bloody bowel movements
pt complain of left calf pain 4/10
Pt very agitated wandering in  room unsteady gait. Pt refusing po meds and  cardiac monitoring
Discontinuation of COVID Isolation
Restless, dysarthria
Pt aggressive
Pt aggressive and agitated
pt is agitated, restless, fighting with staff

## 2022-07-27 NOTE — PROGRESS NOTE ADULT - TIME BILLING
Diagnosis and treatment plan; counselling for secondary stroke prevention  Agree with above; ROS otherwise negative
Advanced care planning was discussed with patient and family.  Advanced care planning forms were reviewed and discussed as appropriate.  Differential diagnosis and plan of care discussed with patient after the evaluation.   Pain assessed and judicious use of narcotics when appropriate was discussed.  Importance of Fall prevention discussed.  Counseling on Smoking and Alcohol cessation was offered when appropriate.  Counseling on Diet, exercise, and medication compliance was done.

## 2022-07-27 NOTE — DISCHARGE NOTE PROVIDER - HOSPITAL COURSE
82 y.o. M with PMH of thyroid ca s/p thyroidectomy in 2018, hyperthyroid, HTN, TIA (15-20 years ago), alzheimer's disease, and depression presented to [facility] ED due to L facial droop, L sided weakness and slurring of speech. LKN 22:00 hr on 7/15. Was asked by wife to take out trash and came back into the house ~22:15, started to have sx. Takes aspirin at home. Denies any toxic habits. No recent illnesses. Denies any headache, chest pain, numbness/tingling throughout the extremities. NIHSS 11 on admission. (He did not know month and age, L facial palsy, LUE some effort, LLE drift, mild aphasia, severe dysarthria), mRS 0.    CT Head No Cont (07.23.2022)  Evolving early subacute infarct in the ventral right frontoparietal   convexity with gyriform petechial hemorrhage is unchanged from CT of   07/18/2022 and MRI of 07/17/2022.  No new intracranial findings.    CT Brain 07.18.22: Right MCA infarct is again seen with some hemorrhagic transformation suspected.    MRI HEAD 07/17/22: Acute right MCA infarct.    CT BRAIN 07.16.22 Developing acute infarct in the right middle cerebral artery vascular territory, involving the ventral aspect of the right frontoparietal convexity.  No hemorrhagic conversion.    CTA Neck and brain 07.15.22 No proximal large vessel intracranial occlusion. Atherosclerotic calcifications contributing to moderate luminal stenosis of the proximal left internal carotid artery by NASCET criteria.    CT Perfusion 07.15.22 No core infarct identified. Penumbra of 10 mL localized to the right MCA territory.    CT BRAIN 07.15.22 No acute intracranial hemorrhage, mass effect, or midline shift.       Impression: L hemiparesis and dysarthria due to acute ischemic stroke R MCA . Mechanism is unclear, Embolic secondary to ESUS vs. hypercoagulable state secondary to COVID 19+ 82 y.o. M with PMH of thyroid ca s/p thyroidectomy in 2018, hyperthyroid, HTN, TIA (15-20 years ago), alzheimer's disease, and depression presented to [facility] ED due to L facial droop, L sided weakness and slurring of speech. LKN 22:00 hr on 7/15. Was asked by wife to take out trash and came back into the house ~22:15, started to have sx. Takes aspirin at home. Denies any toxic habits. No recent illnesses. Denies any headache, chest pain, numbness/tingling throughout the extremities. NIHSS 11 on admission. (He did not know month and age, L facial palsy, LUE some effort, LLE drift, mild aphasia, severe dysarthria), mRS 0.    CT Head No Cont (07.23.2022)  Evolving early subacute infarct in the ventral right frontoparietal   convexity with gyriform petechial hemorrhage is unchanged from CT of   07/18/2022 and MRI of 07/17/2022.  No new intracranial findings.    CT Brain 07.18.22: Right MCA infarct is again seen with some hemorrhagic transformation suspected.    MRI HEAD 07/17/22: Acute right MCA infarct.    CT BRAIN 07.16.22 Developing acute infarct in the right middle cerebral artery vascular territory, involving the ventral aspect of the right frontoparietal convexity.  No hemorrhagic conversion.    CTA Neck and brain 07.15.22 No proximal large vessel intracranial occlusion. Atherosclerotic calcifications contributing to moderate luminal stenosis of the proximal left internal carotid artery by NASCET criteria.    CT Perfusion 07.15.22 No core infarct identified. Penumbra of 10 mL localized to the right MCA territory.    CT BRAIN 07.15.22 No acute intracranial hemorrhage, mass effect, or midline shift.       Impression: L hemiparesis and dysarthria due to acute ischemic stroke R MCA . Mechanism is unclear, Embolic secondary to ESUS vs. hypercoagulable state secondary to COVID 19+   ANTITHROMBOTIC THERAPY:  Eliquis given elevated D-Dimer (2086) hypercoagulable state secondary to Covid infection, switch to ASA on 08/15 for stroke prevention.   TTE shows EF70%, unremarkable, ILR placed to rule out A. Fib as possible source.    Evaluated by PT/OT and was recommended AR. Patient stable for discharge.    82 y.o. M with PMH of thyroid ca s/p thyroidectomy in 2018, hyperthyroid, HTN, TIA (15-20 years ago), alzheimer's disease, and depression presented to [facility] ED due to L facial droop, L sided weakness and slurring of speech. LKN 22:00 hr on 7/15. Was asked by wife to take out trash and came back into the house ~22:15, started to have sx. Takes aspirin at home. Denies any toxic habits. No recent illnesses. Denies any headache, chest pain, numbness/tingling throughout the extremities. NIHSS 11 on admission. (He did not know month and age, L facial palsy, LUE some effort, LLE drift, mild aphasia, severe dysarthria), mRS 0.    CT Head No Cont (07.23.2022)  Evolving early subacute infarct in the ventral right frontoparietal   convexity with gyriform petechial hemorrhage is unchanged from CT of   07/18/2022 and MRI of 07/17/2022.  No new intracranial findings.    CT Brain 07.18.22: Right MCA infarct is again seen with some hemorrhagic transformation suspected.    MRI HEAD 07/17/22: Acute right MCA infarct.    CT BRAIN 07.16.22 Developing acute infarct in the right middle cerebral artery vascular territory, involving the ventral aspect of the right frontoparietal convexity.  No hemorrhagic conversion.    CTA Neck and brain 07.15.22 No proximal large vessel intracranial occlusion. Atherosclerotic calcifications contributing to moderate luminal stenosis of the proximal left internal carotid artery by NASCET criteria.    CT Perfusion 07.15.22 No core infarct identified. Penumbra of 10 mL localized to the right MCA territory.    CT BRAIN 07.15.22 No acute intracranial hemorrhage, mass effect, or midline shift.       Impression: L hemiparesis and dysarthria due to acute ischemic stroke in the R MCA  distribution. Mechanism is unclear, Embolic secondary to ESUS vs. hypercoagulable state secondary to COVID 19+   ANTITHROMBOTIC THERAPY:  Eliquis given elevated D-Dimer (2086) hypercoagulable state secondary to Covid infection, switch to ASA on 08/15 for stroke prevention.   TTE shows EF70%, unremarkable, ILR placed to rule out A. Fib as possible source.    Evaluated by PT/OT and was recommended AR. Patient stable for discharge.

## 2022-07-27 NOTE — PROGRESS NOTE ADULT - PROBLEM SELECTOR PLAN 3
on Eliquis for hypercoagulable state  supplemental O2 as needed

## 2022-07-27 NOTE — DISCHARGE NOTE PROVIDER - CARE PROVIDER_API CALL
Catherine Bansal (NP; RN)  NP in Family Health  [address], [address]  [place] [address]  Phone: [phone]  Fax: [phone]  Follow Up Time: 2 weeks    Gonzalo Cooper ()  Cardiology; Internal Medicine  [address], [address]  [place] [address]  Phone: [phone]  Fax: [phone]  Follow Up Time: 2 weeks

## 2022-07-27 NOTE — PATIENT PROFILE ADULT - FALL HARM RISK - HARM RISK INTERVENTIONS
Assistance with ambulation/Assistance OOB with selected safe patient handling equipment/Communicate Risk of Fall with Harm to all staff/Monitor for mental status changes/Move patient closer to nurses' station/Reinforce activity limits and safety measures with patient and family/Reorient to person, place and time as needed/Tailored Fall Risk Interventions/Toileting schedule using arm’s reach rule for commode and bathroom/Use of alarms - bed, chair and/or voice tab/Visual Cue: Yellow wristband and red socks/Bed in lowest position, wheels locked, appropriate side rails in place/Call bell, personal items and telephone in reach/Instruct patient to call for assistance before getting out of bed or chair/Non-slip footwear when patient is out of bed/[place] to call system/Physically safe environment - no spills, clutter or unnecessary equipment/Purposeful Proactive Rounding/Room/bathroom lighting operational, light cord in reach

## 2022-07-27 NOTE — DISCHARGE NOTE PROVIDER - NSDCQMMRS_NEU_ALL_CORE
2 - Slight disability. Able to lookafter own affairs without assistance, but unable to carry out all previous activities 3 - Moderate disability. Requires some help, but able to walk unassisted.

## 2022-07-27 NOTE — PROGRESS NOTE ADULT - ASSESSMENT
82 y.o. M with PMH of thyroid ca s/p thyroidectomy in 2018, hyperthyroid, HTN, TIA (15-20 years ago), alzheimer's disease, and depression presented to [facility] ED due to L facial droop, L sided weakness and slurring of speech.

## 2022-07-27 NOTE — H&P ADULT - ASSESSMENT
Problem/Plan - 1:  ·  Problem: CVA (cerebral vascular accident).   ·  Plan: acute ischemic stroke R MCA    - Mechanism is unclear, Embolic secondary to ESUS vs. hypercoagulable state secondary to COVID 19+   sp tPA   Eliquis/Statin  neuro checks  TTE - EF 70%, minimal AR   monitor on tele - ILR as per neuro recs  orders per Stroke Team  PT/OT.    Problem/Plan - 2:  ·  Problem: Hypertension.   ·  Plan: SBP goal 100-160  c/w amlodipine as ordered  c/w metoprolol 50mg PO daily  continue to monitor BPs.    Problem/Plan - 3:  ·  Problem: 2019 novel coronavirus disease (COVID-19).   ·  Plan: on Eliquis for hypercoagulable state  supplemental O2 as needed.     MIGUEL ORDONEZ is a 83yo Male s/p right MCA CVA, now with decreased functional mobility, gait instability and ADL impairments.    COMORBIDITES/ACTIVE MEDICAL ISSUES     Gait Instability, ADL impairments and Functional impairments: start Comprehensive Rehab Program of PT/OT     #CVA of R MCA  -Embolic secondary to ESUS vs. hypercoagulable state secondary to COVID 19+   s/p tPA   Eliquis/Statin to continue until 8/15, then statin+ Aspirin 8/15.   TTE - EF 70%, minimal AR    - ILR 7/27  -PT/OT/SLP evaluations to begin  -fall and aspiration precautions    # Hypertension.   c/w amlodipine  c/w metoprolol   -monitor BPs.      # 2019 novel coronavirus disease (COVID-19).   - on Eliquis for hypercoagulable state  - supplemental O2 as needed.    #dementia  -continue regimen, evaluated by psych prior rehab for periods of agitation  -neuropsych eval, strict fall precautions, reorient      Pain Mgmt - Tylenol PRN  GI/Bowel Mgmt -  Senna,  Miralax PRN  /Bladder Mgmt - Voiding independently, PVR      Precautions / PROPHYLAXIS:   - Falls, Cardiac, Seizure   - Ortho: Weight bearing status: WBAT   - Lungs: Aspiration, Incentive Spirometer   - Pressure injury/Skin: Turn Q2hrs while in bed, OOB to Chair, PT/OT    - DVT: on Eliquis    MEDICAL PROGNOSIS: GOOD            REHAB POTENTIAL: GOOD             ESTIMATED DISPOSITION: HOME WITH HOME CARE            ELOS: 10-14 Days   EXPECTED THERAPY:     P.T. 1hr/day       O.T. 1hr/day      S.L.P. 1hr/day     P&O Unnecessary     EXP FREQUENCY: 5 days per 7 day period     PRESCREEN COMPARISION:   I have reviewed the prescreen information and I have found no relevant changes between the preadmission screening and my post admission evaluation     RATIONALE FOR INPATIENT ADMISSION - Patient demonstrates the following: (check all that apply)  [X] Medically appropriate for rehabilitation admission  [X] Has attainable rehab goals with an appropriate initial discharge plan  [X] Has rehabilitation potential (expected to make a significant improvement within a reasonable period of time)   [X] Requires close medical management by a rehab physician, rehab nursing care, Hospitalist and comprehensive interdisciplinary team (including PT, OT, & or SLP, Prosthetics and Orthotics)       MIGUEL ORDONEZ is a 83 yo Male with PMH of thyroid ca s/p thyroidectomy 2018, HTN, TIA (15-20 years ago), alzheimer's disease, and depression presented to [facility] ED on 7/15 with acute Left facial droop/ L sided weakness and slurring of speech, found to have Right MCA CVA s/p tPA. H/C complicated by COVID infection and sundowning.  Now not in infectious period.    COMORBIDITES/ACTIVE MEDICAL ISSUES     Gait Instability, ADL impairments and Functional impairments: start Comprehensive Rehab Program of PT/OT     #CVA of R MCA  -Embolic secondary to ESUS vs. hypercoagulable state secondary to COVID 19+   s/p tPA   Eliquis/Statin to continue until 8/15, then statin+ Aspirin 8/15.   TTE - EF 70%, minimal AR    - ILR 7/27  -PT/OT/SLP evaluations to begin  -fall and aspiration precautions    # Hypertension.   c/w amlodipine  c/w metoprolol   -monitor BPs.      # 2019 novel coronavirus disease (COVID-19).   - on Eliquis for hypercoagulable state  - supplemental O2 as needed.    #dementia  -continue regimen, evaluated by psych prior rehab for periods of agitation  -neuropsych eval, strict fall precautions, reorient      Pain Mgmt - Tylenol PRN  GI/Bowel Mgmt -  Senna,  Miralax PRN  /Bladder Mgmt - Voiding independently, PVR      Precautions / PROPHYLAXIS:   - Falls, Cardiac, Seizure   - Ortho: Weight bearing status: WBAT   - Lungs: Aspiration, Incentive Spirometer   - Pressure injury/Skin: Turn Q2hrs while in bed, OOB to Chair, PT/OT    - DVT: on Eliquis    MEDICAL PROGNOSIS: GOOD            REHAB POTENTIAL: GOOD             ESTIMATED DISPOSITION: HOME WITH HOME CARE            ELOS: 10-14 Days   EXPECTED THERAPY:     P.T. 1hr/day       O.T. 1hr/day      S.L.P. 1hr/day     P&O Unnecessary     EXP FREQUENCY: 5 days per 7 day period     PRESCREEN COMPARISION:   I have reviewed the prescreen information and I have found no relevant changes between the preadmission screening and my post admission evaluation     RATIONALE FOR INPATIENT ADMISSION - Patient demonstrates the following: (check all that apply)  [X] Medically appropriate for rehabilitation admission  [X] Has attainable rehab goals with an appropriate initial discharge plan  [X] Has rehabilitation potential (expected to make a significant improvement within a reasonable period of time)   [X] Requires close medical management by a rehab physician, rehab nursing care, Hospitalist and comprehensive interdisciplinary team (including PT, OT, & or SLP, Prosthetics and Orthotics)       MIGUEL ORDONEZ is a 83 yo Male with PMH of thyroid ca s/p thyroidectomy 2018, HTN, TIA (15-20 years ago), alzheimer's disease, and depression presented to [facility] ED on 7/15 with acute Left facial droop/ L sided weakness and slurring of speech, found to have Right MCA CVA s/p tPA. H/C complicated by COVID infection and sundowning.  Admitted to Acute rehabilitation with cognitive deficits, dysarthria, gait and ADL impairments.     COMORBIDITES/ACTIVE MEDICAL ISSUES     Gait Instability, ADL impairments and Functional impairments: start Comprehensive Rehab Program of PT/OT & speech therapy    #CVA of R MCA  -Embolic secondary to ESUS vs. hypercoagulable state secondary to COVID 19+   s/p tPA   Eliquis/Statin to continue until 8/15, then statin+ Aspirin 8/15.   TTE - EF 70%, minimal AR    - ILR 7/27  -PT/OT/SLP evaluations to begin  -fall and aspiration precautions    # Hypertension.   c/w amlodipine  c/w metoprolol   -monitor BPs.      # 2019 novel coronavirus disease (COVID-19).   - on Eliquis for hypercoagulable state  - supplemental O2 as needed.    #dementia  -continue regimen, evaluated by psych prior rehab for periods of agitation  -neuropsych eval, strict fall precautions, reorient      Pain Mgmt - Tylenol PRN  GI/Bowel Mgmt -  Senna,  Miralax PRN  /Bladder Mgmt - Voiding independently, PVR      Precautions / PROPHYLAXIS:   - Falls, Cardiac, Seizure   - Lungs: Aspiration,  - Pressure injury/Skin: Turn Q2hrs while in bed, OOB to Chair, PT/OT    - DVT: on Eliquis    MEDICAL PROGNOSIS: GOOD            REHAB POTENTIAL: GOOD             ESTIMATED DISPOSITION: HOME WITH HOME CARE            ELOS: 10-14 Days   EXPECTED THERAPY:     P.T. 1hr/day       O.T. 1hr/day      S.L.P. 1hr/day     P&O Unnecessary     EXP FREQUENCY: 5 days per 7 day period     PRESCREEN COMPARISION:   I have reviewed the prescreen information and I have found no relevant changes between the preadmission screening and my post admission evaluation     RATIONALE FOR INPATIENT ADMISSION - Patient demonstrates the following: (check all that apply)  [X] Medically appropriate for rehabilitation admission  [X] Has attainable rehab goals with an appropriate initial discharge plan  [X] Has rehabilitation potential (expected to make a significant improvement within a reasonable period of time)   [X] Requires close medical management by a rehab physician, rehab nursing care, Hospitalist and comprehensive interdisciplinary team (including PT, OT, & or SLP, Prosthetics and Orthotics)

## 2022-07-27 NOTE — H&P ADULT - NSHPLABSRESULTS_GEN_ALL_CORE
ACC: [id number] EXAM:  MR BRAIN                          PROCEDURE DATE:  07/17/2022          INTERPRETATION:  Clinical indication: Stroke code.    MRI of brain was performed using sagittal T1 axial T1 T2 T2 FLAIR   diffusion and susceptibility weighted sequence.    This exam is compared prior head CT performed on July 16, 2022 and   internal auditory canal MRI performed on June 9, 2013.    Parenchymal volume loss and chronic microvascular ischemic changes are   identified    Old lacunar infarctsinvolving the right basal ganglia region are   identified.    Abnormal T2 prolongation with restricted diffusion is seen involving the   right posterior frontal cortical and subcortical region. Involvement of   the right precentral gyrus is identified as well. These findings are   compatible with acute right MCA infarcts. There is a vague area of   associated susceptibility seen which could be compatible with hemorrhagic   transformation. No significant shift or herniation seen.    The large vessels demonstrate normal flow voids    Right maxillary polyp versus cyst is seen. Bilateral ethmoid sinus   mucosal thickening is seen.    The patient is status post bilateral cataract surgery.    IMPRESSION: Acute right MCA infarct.    --- End of Report ---            EMILY MUNSON MD; Attending Radiologist  This document has been electronically signed. Jul 17 2022  4:21PM ACC: [id number] EXAM:  MR BRAIN                          PROCEDURE DATE:  07/17/2022          INTERPRETATION:  Clinical indication: Stroke code.    MRI of brain was performed using sagittal T1 axial T1 T2 T2 FLAIR   diffusion and susceptibility weighted sequence.    This exam is compared prior head CT performed on July 16, 2022 and   internal auditory canal MRI performed on June 9, 2013.    Parenchymal volume loss and chronic microvascular ischemic changes are   identified    Old lacunar infarctsinvolving the right basal ganglia region are   identified.    Abnormal T2 prolongation with restricted diffusion is seen involving the   right posterior frontal cortical and subcortical region. Involvement of   the right precentral gyrus is identified as well. These findings are   compatible with acute right MCA infarcts. There is a vague area of   associated susceptibility seen which could be compatible with hemorrhagic   transformation. No significant shift or herniation seen.    The large vessels demonstrate normal flow voids    Right maxillary polyp versus cyst is seen. Bilateral ethmoid sinus   mucosal thickening is seen.    The patient is status post bilateral cataract surgery.    IMPRESSION: Acute right MCA infarct.    --- End of Report ---            EMILY MUNSON MD; Attending Radiologist  This document has been electronically signed. Jul 17 2022  4:21PM      CT Head No Cont (07.23.2022)  Evolving early subacute infarct in the ventral right frontoparietal   convexity with gyriform petechial hemorrhage is unchanged from CT of   07/18/2022 and MRI of 07/17/2022.  No new intracranial findings.    CT Brain 07.18.22: Right MCA infarct is again seen with some hemorrhagic transformation suspected.    MRI HEAD 07/17/22: Acute right MCA infarct.

## 2022-07-27 NOTE — DISCHARGE NOTE PROVIDER - NSDCQMSTAIRS_GEN_ALL_CORE
Patient has a PCP at [facility] ([place]) that she is going to follow up with.  She has appointment with PCP on may 5th.   Self management of heart failure handout reviewed with the patient.  Emphasized the importance of taking her medication as prescribed, low salt not adding salt to food and follow up appointments being kept.  Living with heart failure booklet reviewed.  Scale provided to the patient.  Denies any questions at this time.   Yes

## 2022-07-27 NOTE — PATIENT PROFILE ADULT - FUNCTIONAL ASSESSMENT - BASIC MOBILITY 2.
Lmtcb. Please relay info:   Order for first trimester screening was entered. Patient can call the maternal fetal medicine department at [phone] to schedule an appointment. 3 = A little assistance

## 2022-07-27 NOTE — PROGRESS NOTE ADULT - PROBLEM SELECTOR PLAN 2
SBP goal 100-160  c/w amlodipine as ordered  c/w metoprolol 50mg PO daily  continue to monitor BPs

## 2022-07-27 NOTE — PROGRESS NOTE ADULT - SUBJECTIVE AND OBJECTIVE BOX
CC: f/u for Covid    Patient reports: he reamains afebrile, comfortable on RA, less agitated    REVIEW OF SYSTEMS:  All other review of systems negative (Comprehensive ROS)    Antimicrobials Day #  :off    Other Medications Reviewed    T(F): 97.9 (22 @ 09:11), Max: 99.4 (22 @ 21:59)  HR: 65 (22 @ 09:11)  BP: 154/63 (22 @ 09:11)  RR: 18 (22 @ 09:11)  SpO2: 97% (22 @ 09:11)  Wt(kg): --    PHYSICAL EXAM:  General: alert, no acute distress  Eyes:  anicteric, no conjunctival injection, no discharge  Oropharynx: no lesions or injection 	  Neck: supple, without adenopathy  Lungs: clear to auscultation  Heart: regular rate and rhythm; no murmur, rubs or gallops  Abdomen: soft, nondistended, nontender, without mass or organomegaly  Skin: no lesions  Extremities: no clubbing, cyanosis, or edema  Neurologic: alert, oriented, moves all extremities    LAB RESULTS:        133<L>  |  97  |  22  ----------------------------<  101<H>  4.0   |  21<L>  |  0.83    Ca    9.3      2022 07:55        Urinalysis Basic - ( 2022 18:48 )    Color: Colorless / Appearance: Clear / S.006 / pH: x  Gluc: x / Ketone: Negative  / Bili: Negative / Urobili: Negative   Blood: x / Protein: Negative / Nitrite: Negative   Leuk Esterase: Negative / RBC: 0 /hpf / WBC 0 /HPF   Sq Epi: x / Non Sq Epi: 0 /hpf / Bacteria: 0.0      MICROBIOLOGY:  RECENT CULTURES:   @ 19:01 .Blood Blood-Peripheral     No growth to date.          RADIOLOGY REVIEWED:    < from: Xray Chest 1 View- PORTABLE-Routine (Xray Chest 1 View- PORTABLE-Routine .) (22 @ 19:38) >  IMPRESSION:  No active pulmonary disease    < end of copied text >

## 2022-07-27 NOTE — DISCHARGE NOTE NURSING/CASE MANAGEMENT/SOCIAL WORK - PATIENT PORTAL LINK FT
You can access the FollowMyHealth Patient Portal offered by [facility] by registering at the following website: [email or web address]. By joining [employer]’s FollowMyHealth portal, you will also be able to view your health information using other applications (apps) compatible with our system.

## 2022-07-27 NOTE — H&P ADULT - NSHPREVIEWOFSYSTEMS_GEN_ALL_CORE
CONSTITUTIONAL: No fever, weight loss, or fatigue  EYES: No eye pain, visual disturbances, or discharge  ENMT:  No difficulty hearing, tinnitus, vertigo; No sinus or throat pain  NECK: No pain or stiffness  BREASTS: No pain, masses, or nipple discharge  RESPIRATORY: No cough, wheezing, chills or hemoptysis; No shortness of breath  CARDIOVASCULAR: No chest pain, palpitations, dizziness, or leg swelling  GASTROINTESTINAL: No abdominal or epigastric pain. No nausea, vomiting, or hematemesis; No diarrhea or constipation. No melena or hematochezia.  GENITOURINARY: No dysuria, frequency, hematuria, or incontinence  NEUROLOGICAL: No headaches, memory loss, loss of strength, numbness, or tremors  SKIN: No itching, burning, rashes, or lesions   LYMPH NODES: No enlarged glands  ENDOCRINE: No heat or cold intolerance; No hair loss  MUSCULOSKELETAL: No joint pain or swelling; No muscle, back, or extremity pain  PSYCHIATRIC: No depression, anxiety, mood swings, or difficulty sleeping  HEME/LYMPH: No easy bruising, or bleeding gums  ALLERY AND IMMUNOLOGIC: No hives or eczema REVIEW OF SYSTEMS  Constitutional: No fever, No Chills, No fatigue  HEENT: No eye pain, +visual disturbances, No difficulty hearing, No Dysphagia   Pulm: No cough,  No shortness of breath  Cardio: No chest pain, No palpitations  GI:  No abdominal pain, No nausea, No vomiting  : No dysuria, No frequency, No hematuria  Neuro: No headaches, No memory loss, No loss of strength, No numbness, No tremors  Skin: No itching, No rashes, No lesions   Endo: No temperature intolerance  MSK: No joint pain, No joint swelling, No muscle pain, No Neck or back pain  Psych:  No depression, No anxiety

## 2022-07-27 NOTE — CONSULT NOTE ADULT - REASON FOR ADMISSION
L facial droop, L sided weakness, slurring of speech

## 2022-07-27 NOTE — H&P ADULT - NSHPPHYSICALEXAM_GEN_ALL_CORE
Vital Signs  T(C): 36.9 (07-27-22 @ 16:44), Max: 37.4 (07-26-22 @ 21:59)  HR: 66 (07-27-22 @ 16:44) (60 - 76)  BP: 158/65 (07-27-22 @ 16:44) (124/58 - 158/65)  RR: 17 (07-27-22 @ 16:44) (17 - 18)  SpO2: 99% (07-27-22 @ 16:44) (97% - 99%)    Gen - NAD, Comfortable  HEENT - NCAT, EOMI, MMM  Neck - Supple, No limited ROM  Pulm - CTAB, No wheeze, No rhonchi, No crackles  Cardiovascular - RRR, S1S2, No m/r/g  Abdomen - Soft, NT/ND, +BS  Extremities - No C/C/E, No calf tenderness  Neuro-     Cognitive - AAOx2 , was not oriented to place     Communication - Fluent, No dysarthria     Attention: Intact      Judgement: impaired     Memory: Recall 3 objects immediate and 0/3 objects 3 minures later        Cranial Nerves - Mild left field cut, otherwise CNIII, IV, VI intact, Sensation intact for LT V1-V3. Tongue midline and symmetric     Motor -                    LEFT    UE - ShAB 5/5, EF 5/5, EE 5/5, WE 5/5,  5/5                    RIGHT UE - ShAB 5/5, EF 5/5, EE 5/5, WE 5/5,  5/5                    LEFT    LE - HF 5/5, KE 5/5, DF 5/5, PF 5/5                    RIGHT LE - HF 5/5, KE 5/5, DF 5/5, PF 5/5        Sensory - Intact to LT     Reflexes - DTR Intact, No primitive reflex     Coordination - FTN intact     Tone - normal  Psychiatric - Mood stable, Affect Flat  Skin: no sacral decubital ulcer. Vital Signs  T(C): 36.9 (07-27-22 @ 16:44), Max: 37.4 (07-26-22 @ 21:59)  HR: 66 (07-27-22 @ 16:44) (60 - 76)  BP: 158/65 (07-27-22 @ 16:44) (124/58 - 158/65)  RR: 17 (07-27-22 @ 16:44) (17 - 18)  SpO2: 99% (07-27-22 @ 16:44) (97% - 99%)    Gen - NAD, Comfortable  HEENT - NCAT, EOMI, MMM  Neck - Supple, No limited ROM  Pulm - CTAB, No wheeze, No rhonchi, No crackles  Cardiovascular - RRR, S1S2, No m/r/g  Abdomen - Soft, NT/ND, +BS  Extremities - No C/C/E, No calf tenderness  Neuro-     Cognitive - AAOx2 , was not oriented to place     Communication - Fluent, No dysarthria     Attention: Impaired-     Judgement: impaired     Memory: Recall 3 objects immediate and 0/3 objects 3 minutes later        Cranial Nerves - Mild left field cut and dysarthria, otherwise CNIII, IV, VI intact, Sensation intact for LT V1-V3. Tongue midline and symmetric     Motor -                    LEFT    UE - ShAB 5/5, EF 5/5, EE 5/5, WE 5/5,  5/5                    RIGHT UE - ShAB 5/5, EF 5/5, EE 5/5, WE 5/5,  5/5                    LEFT    LE - HF 5/5, KE 5/5, DF 5/5, PF 5/5                    RIGHT LE - HF 5/5, KE 5/5, DF 5/5, PF 5/5        Sensory - Intact to LT     Reflexes - DTR Intact, No primitive reflex     Coordination - FTN & HTS intact     Tone - normal  Psychiatric - Mood stable, Affect Flat  Skin: no sacral decubital ulcer.

## 2022-07-27 NOTE — H&P ADULT - HISTORY OF PRESENT ILLNESS
82 y.o. M with PMH of thyroid ca s/p thyroidectomy in 2018, hyperthyroid, HTN, TIA (15-20 years ago), alzheimer's disease, and depression presented to [facility] ED due to L facial droop, L sided weakness and slurring of speech. LKN 22:00 hr on 7/15. Was asked by wife to take out trash and came back into the house ~22:15, started to have sx. Takes aspirin at home. Denies any toxic habits. No recent illnesses. Denies any headache, chest pain, numbness/tingling throughout the extremities. NIHSS 11 on admission. (He did not know month and age, L facial palsy, LUE some effort, LLE drift, mild aphasia, severe dysarthria), mRS 0.  o [facility] ED due to L facial droop, L sided weakness and slurring of speech. LKN 22:00 hr on 7/15. Was asked by wife to take out trash and came back into the house ~22:15, started to have sx. Takes aspirin at home. Denies any toxic habits. No recent illnesses. Denies any headache, chest pain, numbness/tingling throughout the extremities.   : No obvious aphasia appears apathetic, Neurologically without change, will d/c Seroquel PRN dose due to increased lethargy and will start ativan 0.5mg PRN for agitation.  CTH on 7/22 without change, Continue close monitoring for neurologic deterioration, permissive HTN followed by gradual normotension, HgA1C 6.1 %, - continue high intensity statin,  Lidocaine patches PRN for back pain. Physical therapy/OT appreciated and recommended AR. SLP gerda appreciated.     ANTITHROMBOTIC THERAPY:  Eliquis given elevated D-Dimer (2086) hypercoagulable state secondary to Covid infection, switch to ASA on 08/15    PULMONARY:  CXR (07/18): No focal infiltrates. No pleural effusion or pneumothorax, will obtain repeat CXR and encourage incentive spirometer protecting airway, saturating well     CARDIOVASCULAR: TTE shows EF70%, unremarkable, cardiac monitoring: no events, continue Amlodipine to 10 mg daily and Metoprolol ER 50mg daily for BP management. Will need ILR to screen for occult arrythmia prior to discharge.                              SBP goal: 110-160mmHg    GASTROINTESTINAL: passed dysphagia screen , aspiration precautions, s/p MBS on 7/18 with recommendations for minced and moist, seen on 7/21 and diet upgraded, tolerating well      Diet: regular solids with thin liquids    RENAL: s/p straight cath on 07/17 due limited urine output. good urine output, Hyponatremia Will continue to monitor closely.      Na Goal: Greater than 135     Rawls: No    HEMATOLOGY: Will likely need anticoagulation for 1 month due to elevated D-Dimer. LE doppler on 07/19 and on 07/24: negative for DVT     DVT ppx: no need as pt is on Eliquis    ENDO: h/o thyroid CA s/p thyroidectomy, postsurgical hypothyroidism: will continue Levothyroxine 100 mcg daily    ID: Covid +, afebrile, ID following: if fever will send blood cultures, Pt is on airborne/contact precautions, no indication for antivirals.    OTHER:  Depression, Dementia with sundowning at baseline. Restarted home meds including Seroquel 25 mg q hs, Mirtazepine 7.5 mg qhs, Donepezil 10 mg q hs, Namenda 10 mg BID. Agitated on 07/19/22 better with current medication regimen, no longer on restraints. Psych consult appreciated, Plan/goals  of care discussed with pt's daughter, Jordyn.       CT Head No Cont (07.23.2022)  Evolving early subacute infarct in the ventral right frontoparietal   convexity with gyriform petechial hemorrhage is unchanged from CT of   07/18/2022 and MRI of 07/17/2022.  No new intracranial findings.    CT Brain 07.18.22: Right MCA infarct is again seen with some hemorrhagic transformation suspected.    MRI HEAD 07/17/22: Acute right MCA infarct.      Impression: L hemiparesis and dysarthria due to acute ischemic stroke R MCA . Mechanism is unclear, Embolic secondary to ESUS vs. hypercoagulable state secondary to COVID 19+   ANTITHROMBOTIC THERAPY:  Eliquis given elevated D-Dimer (2086) hypercoagulable state secondary to Covid infection, switch to ASA on 08/15 for stroke prevention.   TTE shows EF70%, unremarkable, ILR placed to rule out A. Fib as possible source.    Evaluated by PT/OT and was recommended AR. Patient stable for discharge.     Patient noted to be COVID+- no respiratory symptoms- evaluated by ID and no intervention.        81 yo Male with PMH of thyroid ca s/p thyroidectomy 2018, HTN, TIA (15-20 years ago), alzheimer's disease, and depression presented to [facility] ED on 7/15 with acute Left facial droop/ L sided weakness and slurring of speech. CTH 7/15 neg, MR shows right MCA. S/p Tpa. No LVO/no thrombectomy. EP consulted for ILR. Eliquis started for Covid+ r/o hypercoagulable state. DDimer >2000. ID following, no indication for antivirals, asymptomatic. Transition to ASA on 8/15 (1 month post covid AC). HgA1C 6.1 %, . CXR 7/18 No focal infiltrates. No pleural effusion or pneumothorax. TTE EF70%, unremarkable, cardiac monitoring: no events. Passed dysphagia screen, aspiration precautions, s/p MBS 7/18, upgraded regular solids with thin liquids. Hyponatremia to monitor closely. Will need anticoagulation for 1 month due to elevated D-Dimer. LE doppler 7/19 and on 7/24 negative for DVT. Depression, Dementia with sundowning at baseline- home regimen Seroquel, Mirtazepin, Donepezil, Namenda. Agitated on 7/19, improved, no longer on restraints. Psych in. Evaluated by PMR and acute rehab recommended. Cleared for dc on 7/27.      CT Head No Cont (07.23.2022)  Evolving early subacute infarct in the ventral right frontoparietal   convexity with gyriform petechial hemorrhage is unchanged from CT of   07/18/2022 and MRI of 07/17/2022.  No new intracranial findings.    CT Brain 07.18.22: Right MCA infarct is again seen with some hemorrhagic transformation suspected.    MRI HEAD 07/17/22: Acute right MCA infarct.      Impression: L hemiparesis and dysarthria due to acute ischemic stroke R MCA . Mechanism is unclear, Embolic secondary to ESUS vs. hypercoagulable state secondary to COVID 19+   ANTITHROMBOTIC THERAPY:  Eliquis given elevated D-Dimer (2086) hypercoagulable state secondary to Covid infection, switch to ASA on 08/15 for stroke prevention.   TTE shows EF70%, unremarkable, ILR placed to rule out A. Fib as possible source.    Evaluated by PT/OT and was recommended AR. Patient stable for discharge.     Patient noted to be COVID+- no respiratory symptoms- evaluated by ID and no intervention.        83 yo Male with PMH of thyroid ca s/p thyroidectomy 2018, HTN, TIA (15-20 years ago), alzheimer's disease, and depression presented to [facility] ED on 7/15 with acute Left facial droop/ L sided weakness and slurring of speech. CTH 7/15 neg, MR shows right MCA infarct. S/p Tpa. No LVO/no thrombectomy. EP consulted for ILR. Eliquis started for Covid+ r/o hypercoagulable state. DDimer >2000. ID following, no indication for antivirals, asymptomatic. Transition to ASA on 8/15 (1 month post covid AC). HgA1C 6.1 %, . CXR 7/18 No focal infiltrates. No pleural effusion or pneumothorax. TTE EF70%, unremarkable, cardiac monitoring: no events. Passed dysphagia screen, aspiration precautions, s/p MBS 7/18, upgraded regular solids with thin liquids. Hyponatremia to monitor closely. Will need anticoagulation for 1 month due to elevated D-Dimer. LE doppler 7/19 and on 7/24 negative for DVT. Depression, Dementia with sundowning at baseline- home regimen Seroquel, Mirtazepin, Donepezil, Namenda. Agitated on 7/19, improved, no longer on restraints. Impression: L hemiparesis and dysarthria due to acute ischemic stroke R MCA . Mechanism is unclear, Embolic secondary to ESUS vs. hypercoagulable state secondary to COVID 19+. Eliquis given elevated D-Dimer (2086) hypercoagulable state secondary to Covid infection, switch to ASA on 08/15 for stroke prevention. TTE shows EF70%, unremarkable, ILR placed to rule out A. Fib as possible source. Evaluated by PMR and acute rehab recommended. Cleared for dc on 7/27. Evaluated by PT/OT and was recommended AR. Patient stable for discharge.          81 yo Male with PMH of thyroid ca s/p thyroidectomy 2018, HTN, TIA (15-20 years ago), alzheimer's disease, and depression presented to [facility] ED on 7/15 with acute Left facial droop/ L sided weakness and slurring of speech. CTH 7/15 neg, MR shows right MCA infarct. S/p Tpa. No LVO/no thrombectomy. EP consulted for ILR. Eliquis started for Covid+ r/o hypercoagulable state. DDimer >2000. ID following, no indication for antivirals, asymptomatic. Transition to ASA on 8/15 (1 month post covid AC). HgA1C 6.1 %, . CXR 7/18 No focal infiltrates. No pleural effusion or pneumothorax. TTE EF70%, unremarkable, cardiac monitoring: no events. Passed dysphagia screen, aspiration precautions, s/p MBS 7/18, upgraded regular solids with thin liquids. Hyponatremia to monitor closely. Will need anticoagulation for 1 month due to elevated D-Dimer. LE doppler 7/19 and on 7/24 negative for DVT. Depression, Dementia with sundowning at baseline- home regimen Seroquel, Mirtazepine, Donepezil, Namenda. Agitated on 7/19, improved, no longer on restraints. Impression: L hemiparesis and dysarthria due to acute ischemic stroke R MCA . Mechanism is unclear, Embolic secondary to ESUS vs. hypercoagulable state secondary to COVID 19+. Eliquis given elevated D-Dimer (2086) hypercoagulable state secondary to Covid infection, switch to ASA on 08/15 for stroke prevention. TTE shows EF70%, unremarkable, ILR placed to rule out A. Fib as possible source. Evaluated by PMR and acute rehab recommended. Cleared for dc on 7/27. Evaluated by PT/OT and was recommended AR. Patient stable for discharge.

## 2022-07-27 NOTE — DISCHARGE NOTE NURSING/CASE MANAGEMENT/SOCIAL WORK - NSDCVIVACCINE_GEN_ALL_CORE_FT
Pneumococcal conjugate PCV 13; 22-Oct-2015 20:21; Aileen Abarca (RN); [place]; [id number]; IntraMuscular; Deltoid Right.; 0.5 milliLiter(s); VIS (VIS Published: 27-Feb-2013, VIS Presented: 22-Oct-2015);

## 2022-07-27 NOTE — PROGRESS NOTE ADULT - SUBJECTIVE AND OBJECTIVE BOX
THE PATIENT WAS SEEN AND EXAMINED BY ME WITH THE HOUSESTAFF AND STROKE TEAM DURING MORNING ROUNDS.   HPI:  82 y.o. M with PMH of thyroid ca s/p thyroidectomy in 2018, hyperthyroid, HTN, TIA (15-20 years ago), alzheimer's disease, and depression presented to [facility] ED due to L facial droop, L sided weakness and slurring of speech. LKN 22:00 hr on 7/15. Was asked by wife to take out trash and came back into the house ~22:15, started to have sx. Takes aspirin at home. Denies any toxic habits. No recent illnesses. Denies any headache, chest pain, numbness/tingling throughout the extremities. NIHSS 11 on admission. (He did not know month and age, L facial palsy, LUE some effort, LLE drift, mild aphasia, severe dysarthria), mRS 0.      SUBJECTIVE: No events overnight.  No new neurologic complaints.      acetaminophen     Tablet .. 650 milliGRAM(s) Oral every 6 hours PRN  aluminum hydroxide/magnesium hydroxide/simethicone Suspension 30 milliLiter(s) Oral every 4 hours PRN  amLODIPine   Tablet 10 milliGRAM(s) Oral daily  apixaban 5 milliGRAM(s) Oral two times a day  atorvastatin 80 milliGRAM(s) Oral at bedtime  bisacodyl 5 milliGRAM(s) Oral daily  chlorhexidine 2% Cloths 1 Application(s) Topical daily  donepezil 10 milliGRAM(s) Oral at bedtime  haloperidol     Tablet 2 milliGRAM(s) Oral every 12 hours PRN  hemorrhoidal Ointment 1 Application(s) Rectal daily  levothyroxine 100 MICROGram(s) Oral daily  lidocaine   4% Patch 1 Patch Transdermal daily PRN  LORazepam     Tablet 0.5 milliGRAM(s) Oral daily PRN  melatonin 5 milliGRAM(s) Oral <User Schedule>  memantine 10 milliGRAM(s) Oral two times a day  metoprolol succinate ER 50 milliGRAM(s) Oral daily  mirtazapine 7.5 milliGRAM(s) Oral at bedtime  polyethylene glycol 3350 17 Gram(s) Oral daily  QUEtiapine 25 milliGRAM(s) Oral at bedtime      PHYSICAL EXAM:   Vital Signs Last 24 Hrs  T(C): 36.7 (27 Jul 2022 05:50), Max: 37.4 (26 Jul 2022 21:59)  T(F): 98 (27 Jul 2022 05:50), Max: 99.4 (26 Jul 2022 21:59)  HR: 60 (27 Jul 2022 05:50) (60 - 88)  BP: 136/71 (27 Jul 2022 05:50) (125/62 - 161/70)  RR: 18 (27 Jul 2022 05:50) (17 - 18)  SpO2: 98% (27 Jul 2022 05:50) (97% - 98%)    Parameters below as of 27 Jul 2022 05:50  Patient On (Oxygen Delivery Method): room air      General: No acute distress  HEENT: EOM intact, visual fields full  Abdomen: Soft, nontender, nondistended   Extremities: No edema    NEUROLOGICAL EXAM:  Mental status: Eyes open, awake, apathetic, oriented to person and age, generates a few words only, long pauses when generating speech, requires repetitive prompting to generate single words, follows simple commands.  Cranial Nerves: flattening of left NLF, no nystagmus, moderate dysarthria,  tongue midline  Motor exam: Normal tone, no drift, moves all extremities equally  Sensation: Intact to light touch   Coordination/ Gait:  gait unsteady     LABS:             IMAGING: Reviewed by me.     CT Head No Cont (07.23.2022)  Evolving early subacute infarct in the ventral right frontoparietal   convexity with gyriform petechial hemorrhage is unchanged from CT of   07/18/2022 and MRI of 07/17/2022.  No new intracranial findings.    CT Brain 07.18.22: Right MCA infarct is again seen with some hemorrhagic transformation suspected.    MRI HEAD 07/17/22: Acute right MCA infarct.    CT BRAIN 07.16.22 Developing acute infarct in the right middle cerebral artery vascular territory, involving the ventral aspect of the right frontoparietal convexity.  No hemorrhagic conversion.    CTA Neck and brain 07.15.22 No proximal large vessel intracranial occlusion. Atherosclerotic calcifications contributing to moderate luminal stenosis of the proximal left internal carotid artery by NASCET criteria.    CT Perfusion 07.15.22 No core infarct identified. Penumbra of 10 mL localized to the right MCA territory.    CT BRAIN 07.15.22 No acute intracranial hemorrhage, mass effect, or midline shift.  THE PATIENT WAS SEEN AND EXAMINED BY ME WITH THE HOUSESTAFF AND STROKE TEAM DURING MORNING ROUNDS.   HPI:  82 y.o. M with PMH of thyroid ca s/p thyroidectomy in 2018, hyperthyroid, HTN, TIA (15-20 years ago), alzheimer's disease, and depression presented to [facility] ED due to L facial droop, L sided weakness and slurring of speech. LKN 22:00 hr on 7/15. Was asked by wife to take out trash and came back into the house ~22:15, started to have sx. Takes aspirin at home. Denies any toxic habits. No recent illnesses. Denies any headache, chest pain, numbness/tingling throughout the extremities. NIHSS 11 on admission. (He did not know month and age, L facial palsy, LUE some effort, LLE drift, mild aphasia, severe dysarthria), mRS 0.      SUBJECTIVE: No events overnight.  No new neurologic complaints.      acetaminophen     Tablet .. 650 milliGRAM(s) Oral every 6 hours PRN  aluminum hydroxide/magnesium hydroxide/simethicone Suspension 30 milliLiter(s) Oral every 4 hours PRN  amLODIPine   Tablet 10 milliGRAM(s) Oral daily  apixaban 5 milliGRAM(s) Oral two times a day  atorvastatin 80 milliGRAM(s) Oral at bedtime  bisacodyl 5 milliGRAM(s) Oral daily  chlorhexidine 2% Cloths 1 Application(s) Topical daily  donepezil 10 milliGRAM(s) Oral at bedtime  haloperidol     Tablet 2 milliGRAM(s) Oral every 12 hours PRN  hemorrhoidal Ointment 1 Application(s) Rectal daily  levothyroxine 100 MICROGram(s) Oral daily  lidocaine   4% Patch 1 Patch Transdermal daily PRN  LORazepam     Tablet 0.5 milliGRAM(s) Oral daily PRN  melatonin 5 milliGRAM(s) Oral <User Schedule>  memantine 10 milliGRAM(s) Oral two times a day  metoprolol succinate ER 50 milliGRAM(s) Oral daily  mirtazapine 7.5 milliGRAM(s) Oral at bedtime  polyethylene glycol 3350 17 Gram(s) Oral daily  QUEtiapine 25 milliGRAM(s) Oral at bedtime      PHYSICAL EXAM:   Vital Signs Last 24 Hrs  T(C): 36.7 (27 Jul 2022 05:50), Max: 37.4 (26 Jul 2022 21:59)  T(F): 98 (27 Jul 2022 05:50), Max: 99.4 (26 Jul 2022 21:59)  HR: 60 (27 Jul 2022 05:50) (60 - 88)  BP: 136/71 (27 Jul 2022 05:50) (125/62 - 161/70)  RR: 18 (27 Jul 2022 05:50) (17 - 18)  SpO2: 98% (27 Jul 2022 05:50) (97% - 98%)    Parameters below as of 27 Jul 2022 05:50  Patient On (Oxygen Delivery Method): room air      General: No acute distress  HEENT: EOM intact, visual fields full  Abdomen: Soft, nontender, nondistended   Extremities: No edema    NEUROLOGICAL EXAM:  Mental status: Eyes open, awake, apathetic, oriented to person and age, generates a few words only, long pauses when generating speech, requires repetitive prompting to generate single words, follows simple commands.  Cranial Nerves: flattening of left NLF, no nystagmus, moderate dysarthria,  tongue midline  Motor exam: Normal tone, no drift, moves all extremities equally  Sensation: Intact to light touch   Coordination/ Gait:  gait unsteady     LABS:     None     IMAGING: Reviewed by me.     CT Head No Cont (07.23.2022) Evolving early subacute infarct in the ventral right frontoparietal  convexity with gyriform petechial hemorrhage is unchanged from CT of  07/18/2022 and MRI of 07/17/2022.  No new intracranial findings.    CT Brain 07.18.22: Right MCA infarct is again seen with some hemorrhagic transformation suspected.    MRI HEAD 07/17/22: Acute right MCA infarct.    CT BRAIN 07.16.22 Developing acute infarct in the right middle cerebral artery vascular territory, involving the ventral aspect of the right frontoparietal convexity.  No hemorrhagic conversion.    CTA Neck and brain 07.15.22 No proximal large vessel intracranial occlusion. Atherosclerotic calcifications contributing to moderate luminal stenosis of the proximal left internal carotid artery by NASCET criteria.    CT Perfusion 07.15.22 No core infarct identified. Penumbra of 10 mL localized to the right MCA territory.    CT BRAIN 07.15.22 No acute intracranial hemorrhage, mass effect, or midline shift.

## 2022-07-27 NOTE — H&P ADULT - NSHPSOCIALHISTORY_GEN_ALL_CORE
Called to clarify if message received- yes message received. Patient's daughter verbalizes understanding and agrees with plan of care to call after colonoscopy and hold plavix at this time.      Amb Supervision - CG with poor dynamic balance  Motor 4+/5  AAO x 2  Short Term Memory 1/3  No clonus   Psychiatric - Mood stable, Affect WNL Patient denies smoking, recreational drug use. Occasionally drinks alcohol socially.    Functional Status:  Pt lives with his spouse in a split level home +8 steps to negotiate to second floor with HR. PTA pt was ambulating (I) without an AD and was (I) with all ADLS. As per pt's daughter, family recently purchased home in [place] for patient to be able to live with family.    Current Functioal Status: 7/26/22  Bed Mobility  Bed Mobility Training Sit-to-Supine: contact guard;  verbal cues;  nonverbal cues (demo/gestures);  1 person assist  Bed Mobility Training Supine-to-Sit: contact guard;  verbal cues;  nonverbal cues (demo/gestures);  1 person assist  Bed Mobility Training Limitations: decreased ability to use legs for bridging/pushing;  decreased strength;  impaired balance;  cognitive, decreased safety awareness    Sit-Stand Transfer Training  Transfer Training Sit-to-Stand Transfer: contact guard;  verbal cues;  nonverbal cues (demo/gestures);  1 person assist;  full weight-bearing   rolling walker  Transfer Training Stand-to-Sit Transfer: contact guard;  verbal cues;  nonverbal cues (demo/gestures);  1 person assist;  full weight-bearing   rolling walker  Sit-to-Stand Transfer Training Transfer Safety Analysis: decreased balance;  decreased weight-shifting ability;  decreased sequencing ability;  decreased strength;  impaired balance;  cognitive, decreased safety awareness;  rolling walker    Gait Training  Gait Training: contact guard;  minimum assist (75% patient effort);  nonverbal cues (demo/gestures);  1 person assist;  verbal cues;  full weight-bearing   rolling walker;  40 ft x2  Gait Analysis: 2-point gait   decreased tawana;  decreased step length;  impaired balance;  decreased strength;  40 ft x2;  rolling walker  Gait Number of Times:: x 2  Type of Rest Type of Rest: sitting  Duration of Rest Duration of Rest: 1 minute Patient denies smoking, recreational drug use. Occasionally drinks alcohol socially.    Functional Status:  Pt lives with his spouse in a split level home +8 steps to negotiate to second floor with HR. PTA pt was ambulating (I) without an AD and was (I) with all ADLS. As per pt's daughter, family recently purchased home in [place] for patient to be able to live with family.  Retired Dentist    Current Functioal Status: 7/26/22  Bed Mobility  Bed Mobility Training Sit-to-Supine: contact guard;  verbal cues;  nonverbal cues (demo/gestures);  1 person assist  Bed Mobility Training Supine-to-Sit: contact guard;  verbal cues;  nonverbal cues (demo/gestures);  1 person assist  Bed Mobility Training Limitations: decreased ability to use legs for bridging/pushing;  decreased strength;  impaired balance;  cognitive, decreased safety awareness    Sit-Stand Transfer Training  Transfer Training Sit-to-Stand Transfer: contact guard;  verbal cues;  nonverbal cues (demo/gestures);  1 person assist;  full weight-bearing   rolling walker  Transfer Training Stand-to-Sit Transfer: contact guard;  verbal cues;  nonverbal cues (demo/gestures);  1 person assist;  full weight-bearing   rolling walker  Sit-to-Stand Transfer Training Transfer Safety Analysis: decreased balance;  decreased weight-shifting ability;  decreased sequencing ability;  decreased strength;  impaired balance;  cognitive, decreased safety awareness;  rolling walker    Gait Training  Gait Training: contact guard;  minimum assist (75% patient effort);  nonverbal cues (demo/gestures);  1 person assist;  verbal cues;  full weight-bearing   rolling walker;  40 ft x2  Gait Analysis: 2-point gait   decreased tawana;  decreased step length;  impaired balance;  decreased strength;  40 ft x2;  rolling walker  Gait Number of Times:: x 2  Type of Rest Type of Rest: sitting  Duration of Rest Duration of Rest: 1 minute

## 2022-07-27 NOTE — PROGRESS NOTE ADULT - SUBJECTIVE AND OBJECTIVE BOX
Subjective: Patient seen and examined. No new events except as noted.   Seen earlier this am.  ILR today.  Discharge today.     REVIEW OF SYSTEMS:    CONSTITUTIONAL: + weakness, fevers or chills  EYES/ENT: No visual changes;  No vertigo or throat pain   NECK: No pain or stiffness  RESPIRATORY: No cough, wheezing, hemoptysis; No shortness of breath  CARDIOVASCULAR: No chest pain or palpitations  GASTROINTESTINAL: No abdominal or epigastric pain. No nausea, vomiting, or hematemesis; No diarrhea or constipation. No melena or hematochezia.  GENITOURINARY: No dysuria, frequency or hematuria  NEUROLOGICAL: No numbness or weakness  SKIN: No itching, burning, rashes, or lesions   All other review of systems is negative unless indicated above.    MEDICATIONS:  MEDICATIONS  (STANDING):  amLODIPine   Tablet 10 milliGRAM(s) Oral daily  apixaban 5 milliGRAM(s) Oral two times a day  atorvastatin 80 milliGRAM(s) Oral at bedtime  bisacodyl 5 milliGRAM(s) Oral daily  chlorhexidine 2% Cloths 1 Application(s) Topical daily  donepezil 10 milliGRAM(s) Oral at bedtime  hemorrhoidal Ointment 1 Application(s) Rectal daily  levothyroxine 100 MICROGram(s) Oral daily  melatonin 5 milliGRAM(s) Oral <User Schedule>  memantine 10 milliGRAM(s) Oral two times a day  metoprolol succinate ER 50 milliGRAM(s) Oral daily  mirtazapine 7.5 milliGRAM(s) Oral at bedtime  polyethylene glycol 3350 17 Gram(s) Oral daily  QUEtiapine 25 milliGRAM(s) Oral at bedtime    PHYSICAL EXAM:  T(C): 36.9 (07-27-22 @ 16:44), Max: 37.4 (07-26-22 @ 21:59)  HR: 66 (07-27-22 @ 16:44) (60 - 76)  BP: 158/65 (07-27-22 @ 16:44) (124/58 - 158/65)  RR: 17 (07-27-22 @ 16:44) (17 - 18)  SpO2: 99% (07-27-22 @ 16:44) (97% - 99%)  Wt(kg): --  I&O's Summary    26 Jul 2022 07:01  - 27 Jul 2022 07:00  --------------------------------------------------------  IN: 360 mL / OUT: 0 mL / NET: 360 mL    27 Jul 2022 07:01  -  27 Jul 2022 19:01  --------------------------------------------------------  IN: 840 mL / OUT: 0 mL / NET: 840 mL    Height (cm): 162.6 (07-27 @ 16:44)  Weight (kg): 61.2 (07-27 @ 16:44)  BMI (kg/m2): 23.1 (07-27 @ 16:44)  BSA (m2): 1.66 (07-27 @ 16:44)    Appearance: NAD	  HEENT: Normal oral mucosa, PERRL, EOMI	  Lymphatic: No lymphadenopathy , no edema  Cardiovascular: Normal S1 S2, No JVD, No murmurs , Peripheral pulses palpable 2+ bilaterally  Respiratory: Lungs clear to auscultation, normal effort 	  Gastrointestinal:  Soft, Non-tender, + BS	  Skin: No rashes, No ecchymoses, No cyanosis, warm to touch  NEUROLOGICAL EXAM:  Mental status: Eyes open, awake, apathetic, oriented to person and age, generates a few words only, long pauses when generating speech, requires repetitive prompting to generate single words, follows simple commands.  Cranial Nerves: flattening of left NLF, no nystagmus, moderate dysarthria,  tongue midline  Motor exam: Normal tone, no drift, moves all extremities equally  Sensation: Intact to light touch   Coordination/ Gait:  gait unsteady   Ext: No edema    LABS:    CARDIAC MARKERS:    07-27    133<L>  |  97  |  22  ----------------------------<  101<H>  4.0   |  21<L>  |  0.83    Ca    9.3      27 Jul 2022 07:55    proBNP:   Lipid Profile:   HgA1c:   TSH:     TELEMETRY: 	    ECG:  	  RADIOLOGY:   DIAGNOSTIC TESTING:  [ ] Echocardiogram:  [ ]  Catheterization:  [ ] Stress Test:    OTHER:

## 2022-07-27 NOTE — DISCHARGE NOTE PROVIDER - PROVIDER TOKENS
PROVIDER:[TOKEN:[[id number]],FOLLOWUP:[2 weeks]],PROVIDER:[TOKEN:[[id number]],FOLLOWUP:[2 weeks]]

## 2022-07-27 NOTE — DISCHARGE NOTE PROVIDER - NSDCMRMEDTOKEN_GEN_ALL_CORE_FT
acetaminophen-oxyCODONE 325 mg-5 mg oral tablet: 1 tab(s) orally every 4 hours, As needed, Moderate Pain  Aricept 10 mg oral tablet: 1 tab(s) orally once a day (at bedtime)  aspirin 81 mg oral tablet: 1 tab(s) orally once a day  Augmentin 875 mg-125 mg oral tablet: 1 tab(s) orally every 12 hours x 7 days   Flomax 0.4 mg oral capsule: 1 cap(s) orally once a day  KlonoPIN 1 mg oral tablet: 1 milligram(s) orally once a day (at bedtime)  lisinopril 10 mg oral tablet: 1 tab(s) orally once a day  metoprolol succinate 50 mg oral tablet, extended release: 1 tab(s) orally once a day  mirtazapine 15 mg oral tablet: 1 tab(s) orally once a day (at bedtime)  Namenda 10 mg oral tablet: 1 tab(s) orally 2 times a day  Norvasc 5 mg oral tablet: 1 tab(s) orally once a day  pantoprazole 40 mg oral delayed release tablet: 1 tab(s) orally once a day (before a meal)   aluminum hydroxide-magnesium hydroxide 200 mg-200 mg/5 mL oral suspension: 30 milliliter(s) orally every 4 hours, As needed, Dyspepsia  amLODIPine 10 mg oral tablet: 1 tab(s) orally once a day  apixaban 5 mg oral tablet: 1 tab(s) orally 2 times a day  Stop on 08/14  aspirin 81 mg oral tablet: 1 tab(s) orally once a day  start on 08/15  atorvastatin 80 mg oral tablet: 1 tab(s) orally once a day (at bedtime)  bisacodyl 5 mg oral delayed release tablet: 1 tab(s) orally once a day  donepezil 10 mg oral tablet: 1 tab(s) orally once a day (at bedtime)  Flomax 0.4 mg oral capsule: 1 cap(s) orally once a day  haloperidol 2 mg oral tablet: 1 tab(s) orally every 12 hours, As needed, agitation  levothyroxine 100 mcg (0.1 mg) oral tablet: 1 tab(s) orally once a day  LORazepam 0.5 mg oral tablet: 1 tab(s) orally once a day, As needed, Agitation  melatonin 5 mg oral tablet: 1 tab(s) orally once a day (at bedtime)  memantine 10 mg oral tablet: 1 tab(s) orally 2 times a day  metoprolol succinate 50 mg oral tablet, extended release: 1 tab(s) orally once a day  mirtazapine 7.5 mg oral tablet: 1 tab(s) orally once a day (at bedtime)  pantoprazole 40 mg oral delayed release tablet: 1 tab(s) orally once a day (before a meal)  polyethylene glycol 3350 oral powder for reconstitution: 17 gram(s) orally once a day  QUEtiapine 25 mg oral tablet: 1 tab(s) orally once a day (at bedtime)

## 2022-07-27 NOTE — DISCHARGE NOTE NURSING/CASE MANAGEMENT/SOCIAL WORK - NSDCPEFALRISK_GEN_ALL_CORE
For information on Fall & Injury Prevention, visit: [email or web address] OR  [email or web address] OR  https://www.cdc.gov/steadi/patient.html

## 2022-07-27 NOTE — PROGRESS NOTE ADULT - ASSESSMENT
Patient is a 82y male with a past history of thyroid cancer/thyroidectomy,  dementia, HTN, who was admitted on 7/15 when he developed left facial droop and left sided weakness and speech difficulty.  He was brought to ER , felt to likely have a Rt MCA CVA, and he received TPA.CT and MRI confirm this.  He has been stable, tested positive for Covid, has not had any fever or respiratory difficulty.  He is alert, has dysarthric speech, dementia limits history.It is unclear if he has been vaccinated.  He appears to have asymptomatic Covid, he is not coughing, there is no report of any fever, and his O2 sats are above 95% on RA.  As per chart, his neurological symptoms were acute, no report of any signs of recent respiratory infections.  His CXR is clear   Elevated D dimer noted, he certainly could have a degree of hypercoagulability from Covid as a cause of CVA  He does not appear to have any active respiratory symptoms, therefor hard to time onset of exposure/infection.  He has not developed any clinical  signs of a respiratory infection.  He first tested positive 7/16-standard 10 day isolation  He had a low grade temp of 100 on 7/23, , he temps have normalized  Suggest:  1.Anticoagulation per neurology  2.supportive care   3.I think we can hold off on antivirals such as RDV, not clear if it would provide any benefit, now 1 week post initial positive PCR  4.blood culture any fever spikes  5.Disposition per primary service, no ID objection to discharge planning, with no additional ID w/u planned we will stop actively following, please call if ID issues arise

## 2022-07-27 NOTE — PROVIDER CONTACT NOTE (OTHER) - NAME OF MD/NP/PA/DO NOTIFIED:
Unit Leadership/ Patient Logistics
Patty PA
PASCALE Bateman
PASCALE Chowdary
PASCALE Chowdary
PASCALE Chamberlain
TEDDY Edouard
PASCALE Chowdary

## 2022-07-27 NOTE — PROGRESS NOTE ADULT - PROVIDER SPECIALTY LIST ADULT
Cardiology
Infectious Disease
Neurology
Cardiology
Infectious Disease
Infectious Disease
Neurology
Infectious Disease
Neurology
Cardiology

## 2022-07-27 NOTE — PROGRESS NOTE ADULT - PROBLEM SELECTOR PLAN 1
acute ischemic stroke R MCA    - Mechanism is unclear, Embolic secondary to ESUS vs. hypercoagulable state secondary to COVID 19+   sp tPA   Eliquis/Statin  neuro checks  TTE - EF 70%, minimal AR   monitor on tele - ILR today   orders per Stroke Team  PT/OT

## 2022-07-27 NOTE — PROGRESS NOTE ADULT - REASON FOR ADMISSION
L facial droop, L sided weakness, slurring of speech

## 2022-07-28 LAB
ALBUMIN SERPL ELPH-MCNC: 3 G/DL — LOW (ref 3.3–5)
ALP SERPL-CCNC: 85 U/L — SIGNIFICANT CHANGE UP (ref 40–120)
ALT FLD-CCNC: 130 U/L — HIGH (ref 10–45)
ANION GAP SERPL CALC-SCNC: 10 MMOL/L — SIGNIFICANT CHANGE UP (ref 5–17)
AST SERPL-CCNC: 105 U/L — HIGH (ref 10–40)
BASOPHILS # BLD AUTO: 0.04 K/UL — SIGNIFICANT CHANGE UP (ref 0–0.2)
BASOPHILS NFR BLD AUTO: 0.6 % — SIGNIFICANT CHANGE UP (ref 0–2)
BILIRUB SERPL-MCNC: 0.7 MG/DL — SIGNIFICANT CHANGE UP (ref 0.2–1.2)
BUN SERPL-MCNC: 24 MG/DL — HIGH (ref 7–23)
CALCIUM SERPL-MCNC: 9 MG/DL — SIGNIFICANT CHANGE UP (ref 8.4–10.5)
CHLORIDE SERPL-SCNC: 98 MMOL/L — SIGNIFICANT CHANGE UP (ref 96–108)
CO2 SERPL-SCNC: 25 MMOL/L — SIGNIFICANT CHANGE UP (ref 22–31)
CREAT SERPL-MCNC: 1.19 MG/DL — SIGNIFICANT CHANGE UP (ref 0.5–1.3)
EGFR: 60 ML/MIN/1.73M2 — SIGNIFICANT CHANGE UP
EOSINOPHIL # BLD AUTO: 0.2 K/UL — SIGNIFICANT CHANGE UP (ref 0–0.5)
EOSINOPHIL NFR BLD AUTO: 2.8 % — SIGNIFICANT CHANGE UP (ref 0–6)
GLUCOSE SERPL-MCNC: 96 MG/DL — SIGNIFICANT CHANGE UP (ref 70–99)
HCT VFR BLD CALC: 32.3 % — LOW (ref 39–50)
HGB BLD-MCNC: 11.1 G/DL — LOW (ref 13–17)
IMM GRANULOCYTES NFR BLD AUTO: 0.3 % — SIGNIFICANT CHANGE UP (ref 0–1.5)
LYMPHOCYTES # BLD AUTO: 2.04 K/UL — SIGNIFICANT CHANGE UP (ref 1–3.3)
LYMPHOCYTES # BLD AUTO: 28.9 % — SIGNIFICANT CHANGE UP (ref 13–44)
MCHC RBC-ENTMCNC: 27.8 PG — SIGNIFICANT CHANGE UP (ref 27–34)
MCHC RBC-ENTMCNC: 34.4 GM/DL — SIGNIFICANT CHANGE UP (ref 32–36)
MCV RBC AUTO: 81 FL — SIGNIFICANT CHANGE UP (ref 80–100)
MONOCYTES # BLD AUTO: 0.6 K/UL — SIGNIFICANT CHANGE UP (ref 0–0.9)
MONOCYTES NFR BLD AUTO: 8.5 % — SIGNIFICANT CHANGE UP (ref 2–14)
NEUTROPHILS # BLD AUTO: 4.16 K/UL — SIGNIFICANT CHANGE UP (ref 1.8–7.4)
NEUTROPHILS NFR BLD AUTO: 58.9 % — SIGNIFICANT CHANGE UP (ref 43–77)
NRBC # BLD: 0 /100 WBCS — SIGNIFICANT CHANGE UP (ref 0–0)
PLATELET # BLD AUTO: 205 K/UL — SIGNIFICANT CHANGE UP (ref 150–400)
POTASSIUM SERPL-MCNC: 4.3 MMOL/L — SIGNIFICANT CHANGE UP (ref 3.5–5.3)
POTASSIUM SERPL-SCNC: 4.3 MMOL/L — SIGNIFICANT CHANGE UP (ref 3.5–5.3)
PROT SERPL-MCNC: 7.7 G/DL — SIGNIFICANT CHANGE UP (ref 6–8.3)
RBC # BLD: 3.99 M/UL — LOW (ref 4.2–5.8)
RBC # FLD: 12.6 % — SIGNIFICANT CHANGE UP (ref 10.3–14.5)
SARS-COV-2 RNA SPEC QL NAA+PROBE: SIGNIFICANT CHANGE UP
SODIUM SERPL-SCNC: 133 MMOL/L — LOW (ref 135–145)
WBC # BLD: 7.06 K/UL — SIGNIFICANT CHANGE UP (ref 3.8–10.5)
WBC # FLD AUTO: 7.06 K/UL — SIGNIFICANT CHANGE UP (ref 3.8–10.5)

## 2022-07-28 PROCEDURE — 99221 1ST HOSP IP/OBS SF/LOW 40: CPT | Mod: CS

## 2022-07-28 PROCEDURE — 99232 SBSQ HOSP IP/OBS MODERATE 35: CPT

## 2022-07-28 RX ADMIN — AMLODIPINE BESYLATE 10 MILLIGRAM(S): 2.5 TABLET ORAL at 05:57

## 2022-07-28 RX ADMIN — Medication 6 MILLIGRAM(S): at 21:05

## 2022-07-28 RX ADMIN — FAMOTIDINE 20 MILLIGRAM(S): 10 INJECTION INTRAVENOUS at 12:33

## 2022-07-28 RX ADMIN — Medication 1 APPLICATION(S): at 12:33

## 2022-07-28 RX ADMIN — DONEPEZIL HYDROCHLORIDE 10 MILLIGRAM(S): 10 TABLET, FILM COATED ORAL at 21:05

## 2022-07-28 RX ADMIN — Medication 100 MICROGRAM(S): at 05:57

## 2022-07-28 RX ADMIN — MEMANTINE HYDROCHLORIDE 10 MILLIGRAM(S): 10 TABLET ORAL at 05:57

## 2022-07-28 RX ADMIN — Medication 50 MILLIGRAM(S): at 05:57

## 2022-07-28 RX ADMIN — POLYETHYLENE GLYCOL 3350 17 GRAM(S): 17 POWDER, FOR SOLUTION ORAL at 12:35

## 2022-07-28 RX ADMIN — APIXABAN 5 MILLIGRAM(S): 2.5 TABLET, FILM COATED ORAL at 05:57

## 2022-07-28 RX ADMIN — QUETIAPINE FUMARATE 25 MILLIGRAM(S): 200 TABLET, FILM COATED ORAL at 21:05

## 2022-07-28 RX ADMIN — MEMANTINE HYDROCHLORIDE 10 MILLIGRAM(S): 10 TABLET ORAL at 17:31

## 2022-07-28 RX ADMIN — Medication 5 MILLIGRAM(S): at 12:35

## 2022-07-28 RX ADMIN — PHENYLEPHRINE-SHARK LIVER OIL-MINERAL OIL-PETROLATUM RECTAL OINTMENT 1 APPLICATION(S): at 12:34

## 2022-07-28 RX ADMIN — APIXABAN 5 MILLIGRAM(S): 2.5 TABLET, FILM COATED ORAL at 17:30

## 2022-07-28 RX ADMIN — LIDOCAINE 1 PATCH: 4 CREAM TOPICAL at 12:33

## 2022-07-28 RX ADMIN — Medication 650 MILLIGRAM(S): at 02:40

## 2022-07-28 RX ADMIN — ATORVASTATIN CALCIUM 80 MILLIGRAM(S): 80 TABLET, FILM COATED ORAL at 21:05

## 2022-07-28 RX ADMIN — MIRTAZAPINE 7.5 MILLIGRAM(S): 45 TABLET, ORALLY DISINTEGRATING ORAL at 21:05

## 2022-07-28 RX ADMIN — LIDOCAINE 1 PATCH: 4 CREAM TOPICAL at 20:14

## 2022-07-28 NOTE — PROGRESS NOTE ADULT - ATTENDING COMMENTS
Pt. seen with resident.  Agree with documentation above as per resident with amendments made as appropriate. Patient medically stable. Making progress towards rehab goals.       right MCA infarct  dysarthria, dysphagia-- diet downgraded by Speech therapy to Minced and moist with thins.  Vital stim ordered.

## 2022-07-28 NOTE — PROGRESS NOTE ADULT - SUBJECTIVE AND OBJECTIVE BOX
INTERVAL HPI/OVERNIGHT EVENTS:  Chart Reviewed and patient seen at bedside.  Patient with no complaints or issues overnight.   Speaking in English with us today -- he says he prefers English      ROS:  Denies fevers, chills, chest pain, shortness of breath, abdominal pain, headaches, nausea/vomiting    Vital Signs Last 24 Hrs  T(C): 36.7 (28 Jul 2022 07:35), Max: 36.9 (27 Jul 2022 16:44)  T(F): 98.1 (28 Jul 2022 07:35), Max: 98.4 (27 Jul 2022 16:44)  HR: 63 (28 Jul 2022 07:35) (63 - 74)  BP: 136/52 (28 Jul 2022 07:35) (124/58 - 165/69)  BP(mean): --  RR: 16 (28 Jul 2022 07:35) (16 - 18)  SpO2: 98% (28 Jul 2022 07:35) (96% - 99%)    Parameters below as of 28 Jul 2022 07:35  Patient On (Oxygen Delivery Method): room air        Physical Exam:    Gen - NAD, Comfortable  HEENT - NCAT, EOMI, MMM  Neck - Supple, No limited ROM  Pulm - CTAB, No wheeze, No rhonchi, No crackles  Cardiovascular - RRR, S1S2, No m/r/g  Abdomen - Soft, NT/ND, +BS  Extremities - No C/C/E, No calf tenderness  Neuro-     Cognitive - AAOx2 , was not oriented to place     Communication - Fluent, No dysarthria     Attention: Impaired-     Judgement: impaired     Memory: Recall 3 objects immediate and 0/3 objects 3 minutes later        Cranial Nerves - Mild left field cut and dysarthria, otherwise CNIII, IV, VI intact, Sensation intact for LT V1-V3. Tongue midline and symmetric     Motor -                    LEFT    UE - ShAB 5/5, EF 5/5, EE 5/5, WE 5/5,  5/5                    RIGHT UE - ShAB 5/5, EF 5/5, EE 5/5, WE 5/5,  5/5                    LEFT    LE - HF 5/5, KE 5/5, DF 5/5, PF 5/5                    RIGHT LE - HF 5/5, KE 5/5, DF 5/5, PF 5/5        Sensory - Intact to LT     Reflexes - DTR Intact, No primitive reflex     Coordination - FTN & HTS intact     Tone - normal  Psychiatric - Mood stable, Affect Flat  Skin: no sacral decubital ulcer.      LABS:  07-28    133<L>  |  98  |  24<H>  ----------------------------<  96  4.3   |  25  |  1.19  07-27    133<L>  |  97  |  22  ----------------------------<  101<H>  4.0   |  21<L>  |  0.83    Ca    9.0      28 Jul 2022 06:49  Ca    9.3      27 Jul 2022 07:55    TPro  7.7  /  Alb  3.0<L>  /  TBili  0.7  /  DBili  x   /  AST  105<H>  /  ALT  130<H>  /  AlkPhos  85  07-28                                              11.1   7.06  )-----------( 205      ( 28 Jul 2022 06:49 )             32.3     CAPILLARY BLOOD GLUCOSE

## 2022-07-28 NOTE — CONSULT NOTE ADULT - SUBJECTIVE AND OBJECTIVE BOX
HPI:  83 yo Male with PMH of thyroid ca s/p thyroidectomy 2018, HTN, TIA (15-20 years ago), alzheimer's disease, and depression presented to [facility] ED on 7/15 with acute Left facial droop/ L sided weakness and slurring of speech. CTH 7/15 neg, MR shows right MCA infarct. S/p Tpa. No LVO/no thrombectomy. EP consulted for ILR. Eliquis started for Covid+ r/o hypercoagulable state. DDimer >2000. ID following, no indication for antivirals, asymptomatic. Transition to ASA on 8/15 (1 month post covid AC). HgA1C 6.1 %, . CXR 7/18 No focal infiltrates. No pleural effusion or pneumothorax. TTE EF70%, unremarkable, cardiac monitoring: no events. Passed dysphagia screen, aspiration precautions, s/p MBS 7/18, upgraded regular solids with thin liquids. Hyponatremia to monitor closely. Will need anticoagulation for 1 month due to elevated D-Dimer. LE doppler 7/19 and on 7/24 negative for DVT. Depression, Dementia with sundowning at baseline- home regimen Seroquel, Mirtazepine, Donepezil, Namenda. Agitated on 7/19, improved, no longer on restraints. Impression: L hemiparesis and dysarthria due to acute ischemic stroke R MCA . Mechanism is unclear, Embolic secondary to ESUS vs. hypercoagulable state secondary to COVID 19+. Eliquis given elevated D-Dimer (2086) hypercoagulable state secondary to Covid infection, switch to ASA on 08/15 for stroke prevention. TTE shows EF70%, unremarkable, ILR placed to rule out A. Fib as possible source. Evaluated by PMR and acute rehab recommended. Cleared for dc on 7/27. Evaluated by PT/OT and was recommended AR. Patient stable for discharge.          (27 Jul 2022 15:20)      Pt seen and examined. States he is feeling ok, denies any acute complaints at this time. no overnight events noted.     PAST MEDICAL & SURGICAL HISTORY:  Depressive disorder, not elsewhere classified      Hypertension      CVA (cerebral vascular accident)      No significant past surgical history          CONSTITUTIONAL: No weakness, fevers or chills  EYES/ENT: No visual changes;  No vertigo or throat pain   NECK: No pain or stiffness  RESPIRATORY: No cough, wheezing, hemoptysis; No shortness of breath  CARDIOVASCULAR: No chest pain or palpitations  GASTROINTESTINAL: No abdominal or epigastric pain. No nausea, vomiting, or hematemesis; No diarrhea or constipation. No melena or hematochezia.   GENITOURINARY: No dysuria, frequency or hematuria  NEUROLOGICAL: No numbness or weakness  SKIN: No itching, burning, rashes, or lesions   All other review of systems is negative unless indicated above.    MEDICATIONS  (STANDING):  amLODIPine   Tablet 10 milliGRAM(s) Oral daily  apixaban 5 milliGRAM(s) Oral two times a day  AQUAPHOR (petrolatum Ointment) 1 Application(s) Topical daily  atorvastatin 80 milliGRAM(s) Oral at bedtime  bisacodyl 5 milliGRAM(s) Oral daily  donepezil 10 milliGRAM(s) Oral at bedtime  famotidine    Tablet 20 milliGRAM(s) Oral daily  hemorrhoidal Ointment 1 Application(s) Rectal daily  levothyroxine 100 MICROGram(s) Oral daily  lidocaine   4% Patch 1 Patch Transdermal daily  melatonin 6 milliGRAM(s) Oral at bedtime  memantine 10 milliGRAM(s) Oral two times a day  metoprolol succinate ER 50 milliGRAM(s) Oral daily  mirtazapine 7.5 milliGRAM(s) Oral at bedtime  polyethylene glycol 3350 17 Gram(s) Oral daily  QUEtiapine 25 milliGRAM(s) Oral at bedtime    MEDICATIONS  (PRN):  acetaminophen     Tablet .. 650 milliGRAM(s) Oral every 6 hours PRN Temp greater or equal to 38C (100.4F), Mild Pain (1 - 3), Moderate Pain (4 - 6), Severe Pain (7 - 10)  aluminum hydroxide/magnesium hydroxide/simethicone Suspension 30 milliLiter(s) Oral every 4 hours PRN Dyspepsia  LORazepam     Tablet 0.5 milliGRAM(s) Oral daily PRN Agitation      Allergies    No Known Allergies    Intolerances        SOCIAL HISTORY:  Tobacco negative  Alcohol negative  Drugs negative    FAMILY HISTORY:  No pertinent family history in first degree relatives for CVA or other medical issues        Vital Signs Last 24 Hrs  T(C): 36.7 (28 Jul 2022 07:35), Max: 36.9 (27 Jul 2022 16:44)  T(F): 98.1 (28 Jul 2022 07:35), Max: 98.4 (27 Jul 2022 16:44)  HR: 63 (28 Jul 2022 07:35) (63 - 74)  BP: 136/52 (28 Jul 2022 07:35) (136/52 - 165/69)  BP(mean): --  RR: 16 (28 Jul 2022 07:35) (16 - 17)  SpO2: 98% (28 Jul 2022 07:35) (96% - 99%)    Parameters below as of 28 Jul 2022 07:35  Patient On (Oxygen Delivery Method): room air        General: WN/WD NAD  Neurology: A&Ox2, nonfocal, BOLANOS x 4  Respiratory: CTA B/L  CV: RRR, S1S2, no murmurs, rubs or gallops  Abdominal: Soft, NT, ND +BS, Last BM yesterday, notable midline hernia  Extremities: No edema, + peripheral pulses  psych: pleasant mood    LABS:                        11.1   7.06  )-----------( 205      ( 28 Jul 2022 06:49 )             32.3     07-28    133<L>  |  98  |  24<H>  ----------------------------<  96  4.3   |  25  |  1.19    Ca    9.0      28 Jul 2022 06:49    TPro  7.7  /  Alb  3.0<L>  /  TBili  0.7  /  DBili  x   /  AST  105<H>  /  ALT  130<H>  /  AlkPhos  85  07-28          RADIOLOGY & ADDITIONAL STUDIES:

## 2022-07-28 NOTE — DIETITIAN INITIAL EVALUATION ADULT - WEIGHT (KG)
Astoria Spoon called requesting a refill on the following medications:  Requested Prescriptions     Pending Prescriptions Disp Refills    oxyCODONE HCl (OXY-IR) 10 MG immediate release tablet 120 tablet 0     Sig: Take 1 tablet by mouth every 6 hours as needed for Pain for up to 30 days. Intended supply: 30 days    tiZANidine (ZANAFLEX) 4 MG tablet 120 tablet 0     Sig: Take 1 tablet by mouth every 6 hours as needed (spasms)     [address]      Date of last visit: 7/12/2022  Date of next visit (if applicable): 9/93/3403 60.5

## 2022-07-28 NOTE — PROGRESS NOTE ADULT - ASSESSMENT
MIGUEL ORDONEZ is a 81 yo Male with PMH of thyroid ca s/p thyroidectomy 2018, HTN, TIA (15-20 years ago), alzheimer's disease, and depression presented to [facility] ED on 7/15 with acute Left facial droop/ L sided weakness and slurring of speech, found to have Right MCA CVA s/p tPA. H/C complicated by COVID infection and sundowning.  Admitted to Acute rehabilitation with cognitive deficits, dysarthria, gait and ADL impairments.     COMORBIDITES/ACTIVE MEDICAL ISSUES     Gait Instability, ADL impairments and Functional impairments: start Comprehensive Rehab Program of PT/OT & speech therapy    #CVA of R MCA  -Embolic secondary to ESUS vs. hypercoagulable state secondary to COVID 19+   s/p tPA   Eliquis/Statin to continue until 8/15, then statin+ Aspirin 8/15.   TTE - EF 70%, minimal AR    - ILR 7/27  -PT/OT/SLP evaluations to begin  -fall and aspiration precautions    # Hypertension.   c/w amlodipine  c/w metoprolol   -monitor BPs.      # 2019 novel coronavirus disease (COVID-19).   - on Eliquis for hypercoagulable state  - supplemental O2 as needed.    #dementia  -continue regimen, evaluated by psych prior rehab for periods of agitation  -neuropsych eval, strict fall precautions, reorient    #Diet  - downgraded to minced and moist diet per SLP  - Vital Stim during SLP  - May need repeat MBSS      Pain Mgmt - Tylenol PRN  GI/Bowel Mgmt -  Senna,  Miralax PRN  /Bladder Mgmt - Voiding independently, PVR q8h, CIC >400      Precautions / PROPHYLAXIS:   - Falls, Cardiac, Seizure   - Lungs: Aspiration,  - Pressure injury/Skin: Turn Q2hrs while in bed, OOB to Chair, PT/OT    - DVT: on Eliquis    MEDICAL PROGNOSIS: GOOD            REHAB POTENTIAL: GOOD             ESTIMATED DISPOSITION: HOME WITH HOME CARE            ELOS: 10-14 Days   EXPECTED THERAPY:     P.T. 1hr/day       O.T. 1hr/day      S.L.P. 1hr/day     P&O Unnecessary     EXP FREQUENCY: 5 days per 7 day period     PRESCREEN COMPARISION:   I have reviewed the prescreen information and I have found no relevant changes between the preadmission screening and my post admission evaluation     RATIONALE FOR INPATIENT ADMISSION - Patient demonstrates the following: (check all that apply)  [X] Medically appropriate for rehabilitation admission  [X] Has attainable rehab goals with an appropriate initial discharge plan  [X] Has rehabilitation potential (expected to make a significant improvement within a reasonable period of time)   [X] Requires close medical management by a rehab physician, rehab nursing care, Hospitalist and comprehensive interdisciplinary team (including PT, OT, & or SLP, Prosthetics and Orthotics)     MIGUEL ORDONEZ is a 81 yo Male with PMH of thyroid ca s/p thyroidectomy 2018, HTN, TIA (15-20 years ago), alzheimer's disease, and depression presented to [facility] ED on 7/15 with acute Left facial droop/ L sided weakness and slurring of speech, found to have Right MCA CVA s/p tPA. H/C complicated by COVID infection and sundowning.  Admitted to Acute rehabilitation with cognitive deficits, dysarthria, gait and ADL impairments.     COMORBIDITES/ACTIVE MEDICAL ISSUES     Gait Instability, ADL impairments and Functional impairments: start Comprehensive Rehab Program of PT/OT & speech therapy    #CVA of R MCA  -Embolic secondary to ESUS vs. hypercoagulable state secondary to COVID 19+   s/p tPA   Eliquis/Statin to continue until 8/15, then statin+ Aspirin 8/15.   TTE - EF 70%, minimal AR    - ILR 7/27  -PT/OT/SLP evaluations to begin  -fall and aspiration precautions    # Hypertension.   c/w amlodipine  c/w metoprolol   -monitor BPs.      # 2019 novel coronavirus disease (COVID-19).   - on Eliquis for hypercoagulable state  - supplemental O2 as needed.    #dementia  -continue regimen, evaluated by psych prior rehab for periods of agitation  -neuropsych eval, strict fall precautions, reorient    #dysphagia  - downgraded to minced and moist diet per SLP  - Vital Stim during SLP  - May need repeat MBSS      Pain Mgmt - Tylenol PRN  GI/Bowel Mgmt -  Senna,  Miralax PRN  /Bladder Mgmt - Voiding independently, PVR q8h, CIC >400      Precautions / PROPHYLAXIS:   - Falls, Cardiac, Seizure   - Lungs: Aspiration,  -- DVT: on Eliquis

## 2022-07-28 NOTE — CONSULT NOTE ADULT - ASSESSMENT
MIGUEL ORDONEZ is a 83 yo Male with PMH of thyroid ca s/p thyroidectomy 2018, HTN, TIA (15-20 years ago), alzheimer's disease, and depression presented to [facility] ED on 7/15 with acute Left facial droop/ L sided weakness and slurring of speech, found to have Right MCA CVA s/p tPA. H/C complicated by COVID infection and sundowning.  Admitted to Acute rehabilitation with cognitive deficits, dysarthria, gait and ADL impairments.       #CVA of R MCA  Pt medically optimized for comprehensive rehab therapy:  as per physiatry plan of care  -Embolic secondary to ESUS vs. hypercoagulable state secondary to COVID 19+   s/p tPA   Eliquis/Statin to continue until 8/15, then statin+ Aspirin 8/15.   TTE - EF 70%, minimal AR    - ILR 7/27  -PT/OT/SLP evaluations to begin  -fall and aspiration precautions    # Hypertension.   c/w amlodipine  c/w metoprolol, may uptrate as needed for bp goal, assure low salt diet  -monitor BPs.      # 2019 novel coronavirus disease (COVID-19).   - on Eliquis for hypercoagulable state  - supplemental O2 as needed.    #dementia  -continue regimen, evaluated by psych prior rehab for periods of agitation  -neuropsych eval, strict fall precautions, reorient    #Diet  - downgraded to minced and moist diet per SLP  - Vital Stim during SLP  - May need repeat MBSS      DVT ppx  eliquis

## 2022-07-29 PROCEDURE — 99233 SBSQ HOSP IP/OBS HIGH 50: CPT

## 2022-07-29 RX ORDER — RAMELTEON 8 MG
8 TABLET ORAL AT BEDTIME
Refills: 0 | Status: DISCONTINUED | OUTPATIENT
Start: 2022-07-29 | End: 2022-08-06

## 2022-07-29 RX ADMIN — Medication 8 MILLIGRAM(S): at 21:03

## 2022-07-29 RX ADMIN — ATORVASTATIN CALCIUM 80 MILLIGRAM(S): 80 TABLET, FILM COATED ORAL at 21:03

## 2022-07-29 RX ADMIN — Medication 5 MILLIGRAM(S): at 12:01

## 2022-07-29 RX ADMIN — APIXABAN 5 MILLIGRAM(S): 2.5 TABLET, FILM COATED ORAL at 17:57

## 2022-07-29 RX ADMIN — MIRTAZAPINE 7.5 MILLIGRAM(S): 45 TABLET, ORALLY DISINTEGRATING ORAL at 21:03

## 2022-07-29 RX ADMIN — FAMOTIDINE 20 MILLIGRAM(S): 10 INJECTION INTRAVENOUS at 12:01

## 2022-07-29 RX ADMIN — Medication 100 MICROGRAM(S): at 05:04

## 2022-07-29 RX ADMIN — APIXABAN 5 MILLIGRAM(S): 2.5 TABLET, FILM COATED ORAL at 05:04

## 2022-07-29 RX ADMIN — POLYETHYLENE GLYCOL 3350 17 GRAM(S): 17 POWDER, FOR SOLUTION ORAL at 12:03

## 2022-07-29 RX ADMIN — QUETIAPINE FUMARATE 25 MILLIGRAM(S): 200 TABLET, FILM COATED ORAL at 21:03

## 2022-07-29 RX ADMIN — MEMANTINE HYDROCHLORIDE 10 MILLIGRAM(S): 10 TABLET ORAL at 05:04

## 2022-07-29 RX ADMIN — LIDOCAINE 1 PATCH: 4 CREAM TOPICAL at 00:30

## 2022-07-29 RX ADMIN — Medication 50 MILLIGRAM(S): at 05:04

## 2022-07-29 RX ADMIN — Medication 1 APPLICATION(S): at 17:56

## 2022-07-29 RX ADMIN — PHENYLEPHRINE-SHARK LIVER OIL-MINERAL OIL-PETROLATUM RECTAL OINTMENT 1 APPLICATION(S): at 17:55

## 2022-07-29 RX ADMIN — DONEPEZIL HYDROCHLORIDE 10 MILLIGRAM(S): 10 TABLET, FILM COATED ORAL at 21:03

## 2022-07-29 RX ADMIN — LIDOCAINE 1 PATCH: 4 CREAM TOPICAL at 19:27

## 2022-07-29 RX ADMIN — LIDOCAINE 1 PATCH: 4 CREAM TOPICAL at 12:01

## 2022-07-29 RX ADMIN — MEMANTINE HYDROCHLORIDE 10 MILLIGRAM(S): 10 TABLET ORAL at 17:57

## 2022-07-29 RX ADMIN — AMLODIPINE BESYLATE 10 MILLIGRAM(S): 2.5 TABLET ORAL at 05:04

## 2022-07-29 NOTE — PROGRESS NOTE ADULT - ASSESSMENT
MIGUEL ORDONEZ is a 83 yo Male with PMH of thyroid ca s/p thyroidectomy 2018, HTN, TIA (15-20 years ago), alzheimer's disease, and depression presented to [facility] ED on 7/15 with acute Left facial droop/ L sided weakness and slurring of speech, found to have Right MCA CVA s/p tPA. H/C complicated by COVID infection and sundowning.  Admitted to Acute rehabilitation with cognitive deficits, dysarthria, gait and ADL impairments.     COMORBIDITES/ACTIVE MEDICAL ISSUES     Gait Instability, ADL impairments and Functional impairments: start Comprehensive Rehab Program of PT/OT & speech therapy    #CVA of R MCA  -Embolic secondary to ESUS vs. hypercoagulable state secondary to COVID 19+   s/p tPA   Eliquis/Statin to continue until 8/15, then statin+ Aspirin 8/15.   TTE - EF 70%, minimal AR    - ILR 7/27  -PT/OT/SLP evaluations to begin  -fall and aspiration precautions    # Hypertension.   c/w amlodipine  c/w metoprolol   -monitor BPs.      # 2019 novel coronavirus disease (COVID-19).   - on Eliquis for hypercoagulable state  - supplemental O2 as needed.    #dementia  -continue regimen, evaluated by psych prior rehab for periods of agitation  -neuropsych eval, strict fall precautions, reorient    #dysphagia  - downgraded to minced and moist diet per SLP  - Vital Stim during SLP  - May need repeat MBSS    #Sleep/Insomnia  - trial Rozerem 7/29      Pain Mgmt - Tylenol PRN  GI/Bowel Mgmt -  Senna,  Miralax PRN  /Bladder Mgmt - Voiding independently, PVR q8h, CIC >400      Precautions / PROPHYLAXIS:   - Falls, Cardiac, Seizure   - Lungs: Aspiration,  -- DVT: on Eliquis   MIGUEL ORDONEZ is a 81 yo Male with PMH of thyroid ca s/p thyroidectomy 2018, HTN, TIA (15-20 years ago), alzheimer's disease, and depression presented to [facility] ED on 7/15 with acute Left facial droop/ L sided weakness and slurring of speech, found to have Right MCA CVA s/p tPA. H/C complicated by COVID infection and sundowning.  Admitted to Acute rehabilitation with cognitive deficits, dysarthria, gait and ADL impairments.     COMORBIDITES/ACTIVE MEDICAL ISSUES     Gait Instability, ADL impairments and Functional impairments: start Comprehensive Rehab Program of PT/OT & speech therapy    #CVA of R MCA  -Embolic secondary to ESUS vs. hypercoagulable state secondary to COVID 19+   s/p tPA   Eliquis/Statin to continue until 8/15, then statin+ Aspirin 8/15.   TTE - EF 70%, minimal AR    - ILR 7/27  -PT/OT/SLP evaluations to begin  -fall and aspiration precautions    # Hypertension.   c/w amlodipine  c/w metoprolol   -monitor BPs.  --SBP goal 120-150      # 2019 novel coronavirus disease (COVID-19).   - on Eliquis for hypercoagulable state  - supplemental O2 as needed.    #dementia  -continue donepezil, memantine, and seroquel-- pt's home medication.    -neuropsych eval, strict fall precautions, reorient    depression--  --Cont. Seroquel-- pt. was on this medication at home for depression.   --Cont. mirtazepine.    #dysphagia/dysarthria  - downgraded to minced and moist diet per SLP  - Vital Stim during SLP  - May need repeat MBSS    #Sleep/Insomnia  - trial Rozerem 7/29    Pain Mgmt - Tylenol PRN  GI/Bowel Mgmt -  Senna,  Miralax PRN  /Bladder Mgmt - Voiding independently, Check PVR     Precautions / PROPHYLAXIS:   - Falls, Cardiac, Seizure   - Lungs: Aspiration,  -- DVT: on Eliquis

## 2022-07-29 NOTE — DIETITIAN INITIAL EVALUATION ADULT - OTHER INFO
81 yo Male with PMH of thyroid ca s/p thyroidectomy 2018, HTN, TIA (15-20 years ago), alzheimer's disease, and depression presented to [facility] ED on 7/15 with acute Left facial droop/ L sided weakness and slurring of speech, found to have Right MCA CVA s/p tPA. H/C complicated by COVID infection and sundowning.  Admitted to Acute rehabilitation with cognitive deficits, dysarthria, gait and ADL impairments.    81 yo Male with PMH of thyroid ca s/p thyroidectomy 2018, HTN, TIA (15-20 years ago), alzheimer's disease, and depression presented to [facility] ED on 7/15 with acute Left facial droop/ L sided weakness and slurring of speech, found to have Right MCA CVA s/p tPA. H/C complicated by COVID infection and sundowning.  Admitted to Acute rehabilitation with cognitive deficits, dysarthria, gait and ADL impairments.     Pt tolerating minced and moist consistency, eating >75% of meals, ate breakfast with good PO intake today per nursing. Unable to obtain specific food preferences at this time, although pt reports liking typical  food (rice, lentils, naan, etc.).

## 2022-07-29 NOTE — PROGRESS NOTE ADULT - SUBJECTIVE AND OBJECTIVE BOX
INTERVAL HPI/OVERNIGHT EVENTS:  Chart Reviewed and patient seen at bedside.  Pt denied trouble sleeping last night, but we were later informed by his SLP that he reports trouble sleeping last night.  Otherwise has a great appetite.    ROS:  Denies fevers, chills, chest pain, shortness of breath, abdominal pain, headaches, nausea/vomiting    Vital Signs Last 24 Hrs  T(C): 36.4 (29 Jul 2022 09:03), Max: 36.8 (28 Jul 2022 20:29)  T(F): 97.5 (29 Jul 2022 09:03), Max: 98.2 (28 Jul 2022 20:29)  HR: 88 (29 Jul 2022 09:03) (68 - 88)  BP: 122/76 (29 Jul 2022 09:03) (122/76 - 161/70)  BP(mean): --  RR: 15 (29 Jul 2022 09:03) (15 - 16)  SpO2: 96% (29 Jul 2022 09:03) (96% - 98%)    Parameters below as of 29 Jul 2022 09:03  Patient On (Oxygen Delivery Method): room air    Physical Exam:    Gen - NAD, Comfortable  HEENT - NCAT, EOMI, MMM  Neck - Supple, No limited ROM  Pulm - CTAB, No wheeze, No rhonchi, No crackles  Cardiovascular - RRR, S1S2, No m/r/g  Abdomen - Soft, NT/ND, +BS  Extremities - No C/C/E, No calf tenderness  Neuro-     Cognitive - AAOx2 , was not oriented to place     Communication - Fluent, Mild dysarthria     Attention: Impaired-     Judgement: impaired     Memory: Recall 3 objects immediate and 0/3 objects 3 minutes later        Cranial Nerves - Mild left field cut and dysarthria, otherwise CNIII, IV, VI intact, Sensation intact for LT V1-V3. Tongue midline and symmetric     Motor -                    LEFT    UE - ShAB 5/5, EF 5/5, EE 5/5, WE 5/5,  5/5                    RIGHT UE - ShAB 5/5, EF 5/5, EE 5/5, WE 5/5,  5/5                    LEFT    LE - HF 5/5, KE 5/5, DF 5/5, PF 5/5                    RIGHT LE - HF 5/5, KE 5/5, DF 5/5, PF 5/5        Sensory - Intact to LT     Reflexes - DTR Intact, No primitive reflex     Coordination - FTN & HTS intact     Tone - normal  Psychiatric - Mood stable, Affect Flat  Skin: no sacral decubital ulcer.      LABS:  07-28    133<L>  |  98  |  24<H>  ----------------------------<  96  4.3   |  25  |  1.19  07-27    133<L>  |  97  |  22  ----------------------------<  101<H>  4.0   |  21<L>  |  0.83    Ca    9.0      28 Jul 2022 06:49  Ca    9.3      27 Jul 2022 07:55    TPro  7.7  /  Alb  3.0<L>  /  TBili  0.7  /  DBili  x   /  AST  105<H>  /  ALT  130<H>  /  AlkPhos  85  07-28                                              11.1   7.06  )-----------( 205      ( 28 Jul 2022 06:49 )             32.3     CAPILLARY BLOOD GLUCOSE

## 2022-07-29 NOTE — PROGRESS NOTE ADULT - ATTENDING COMMENTS
Pt. seen with resident.  Agree with documentation above as per resident with amendments made as appropriate. Patient medically stable. Making progress towards rehab goals.     Right MCA infarct  --difficulty sleeping-- trial rozerem.    ** Spoke with pt's  _daughter, Dr. Jordyn Holt___, to provide update on pt's status, ,  medical update, progress in therapy, rehab plan of care and ELOS and discharge needs/expectations.  Pt. will have supervision at home from family.  All questions answered.   Jordyn said pt. has h/o depression and failed multiple medication treatments and was on seroquel and mirtazepine for depression.

## 2022-07-29 NOTE — DIETITIAN INITIAL EVALUATION ADULT - PERTINENT LABORATORY DATA
07-28    133<L>  |  98  |  24<H>  ----------------------------<  96  4.3   |  25  |  1.19    Ca    9.0      28 Jul 2022 06:49    TPro  7.7  /  Alb  3.0<L>  /  TBili  0.7  /  DBili  x   /  AST  105<H>  /  ALT  130<H>  /  AlkPhos  85  07-28  A1C with Estimated Average Glucose Result: 6.1 % (07-16-22 @ 06:05)

## 2022-07-29 NOTE — DIETITIAN INITIAL EVALUATION ADULT - ORAL INTAKE PTA/DIET HISTORY
Pt confused at times per nursing, unknown if pt is a reliable historian. Reports good appetite @ home, eats typical [place] food. Does not eat beef or pork. Unable to determine UBW.

## 2022-07-29 NOTE — DIETITIAN INITIAL EVALUATION ADULT - PERTINENT MEDS FT
MEDICATIONS  (STANDING):  amLODIPine   Tablet 10 milliGRAM(s) Oral daily  apixaban 5 milliGRAM(s) Oral two times a day  AQUAPHOR (petrolatum Ointment) 1 Application(s) Topical daily  atorvastatin 80 milliGRAM(s) Oral at bedtime  bisacodyl 5 milliGRAM(s) Oral daily  donepezil 10 milliGRAM(s) Oral at bedtime  famotidine    Tablet 20 milliGRAM(s) Oral daily  hemorrhoidal Ointment 1 Application(s) Rectal daily  levothyroxine 100 MICROGram(s) Oral daily  lidocaine   4% Patch 1 Patch Transdermal daily  memantine 10 milliGRAM(s) Oral two times a day  metoprolol succinate ER 50 milliGRAM(s) Oral daily  mirtazapine 7.5 milliGRAM(s) Oral at bedtime  polyethylene glycol 3350 17 Gram(s) Oral daily  QUEtiapine 25 milliGRAM(s) Oral at bedtime  ramelteon 8 milliGRAM(s) Oral at bedtime    MEDICATIONS  (PRN):  acetaminophen     Tablet .. 650 milliGRAM(s) Oral every 6 hours PRN Temp greater or equal to 38C (100.4F), Mild Pain (1 - 3), Moderate Pain (4 - 6), Severe Pain (7 - 10)  aluminum hydroxide/magnesium hydroxide/simethicone Suspension 30 milliLiter(s) Oral every 4 hours PRN Dyspepsia  LORazepam     Tablet 0.5 milliGRAM(s) Oral daily PRN Agitation

## 2022-07-30 LAB
CULTURE RESULTS: SIGNIFICANT CHANGE UP
CULTURE RESULTS: SIGNIFICANT CHANGE UP
SPECIMEN SOURCE: SIGNIFICANT CHANGE UP
SPECIMEN SOURCE: SIGNIFICANT CHANGE UP

## 2022-07-30 PROCEDURE — 99232 SBSQ HOSP IP/OBS MODERATE 35: CPT

## 2022-07-30 RX ORDER — QUETIAPINE FUMARATE 200 MG/1
25 TABLET, FILM COATED ORAL
Refills: 0 | Status: DISCONTINUED | OUTPATIENT
Start: 2022-07-30 | End: 2022-08-06

## 2022-07-30 RX ADMIN — QUETIAPINE FUMARATE 25 MILLIGRAM(S): 200 TABLET, FILM COATED ORAL at 18:33

## 2022-07-30 RX ADMIN — FAMOTIDINE 20 MILLIGRAM(S): 10 INJECTION INTRAVENOUS at 12:02

## 2022-07-30 RX ADMIN — LIDOCAINE 1 PATCH: 4 CREAM TOPICAL at 00:24

## 2022-07-30 RX ADMIN — Medication 8 MILLIGRAM(S): at 21:07

## 2022-07-30 RX ADMIN — Medication 650 MILLIGRAM(S): at 18:19

## 2022-07-30 RX ADMIN — MEMANTINE HYDROCHLORIDE 10 MILLIGRAM(S): 10 TABLET ORAL at 05:47

## 2022-07-30 RX ADMIN — APIXABAN 5 MILLIGRAM(S): 2.5 TABLET, FILM COATED ORAL at 05:47

## 2022-07-30 RX ADMIN — Medication 100 MICROGRAM(S): at 05:47

## 2022-07-30 RX ADMIN — ATORVASTATIN CALCIUM 80 MILLIGRAM(S): 80 TABLET, FILM COATED ORAL at 21:07

## 2022-07-30 RX ADMIN — MEMANTINE HYDROCHLORIDE 10 MILLIGRAM(S): 10 TABLET ORAL at 17:31

## 2022-07-30 RX ADMIN — AMLODIPINE BESYLATE 10 MILLIGRAM(S): 2.5 TABLET ORAL at 05:47

## 2022-07-30 RX ADMIN — MIRTAZAPINE 7.5 MILLIGRAM(S): 45 TABLET, ORALLY DISINTEGRATING ORAL at 21:07

## 2022-07-30 RX ADMIN — LIDOCAINE 1 PATCH: 4 CREAM TOPICAL at 12:02

## 2022-07-30 RX ADMIN — Medication 50 MILLIGRAM(S): at 05:47

## 2022-07-30 RX ADMIN — Medication 650 MILLIGRAM(S): at 17:32

## 2022-07-30 RX ADMIN — Medication 30 MILLILITER(S): at 19:56

## 2022-07-30 RX ADMIN — APIXABAN 5 MILLIGRAM(S): 2.5 TABLET, FILM COATED ORAL at 17:31

## 2022-07-30 RX ADMIN — LIDOCAINE 1 PATCH: 4 CREAM TOPICAL at 19:52

## 2022-07-30 RX ADMIN — DONEPEZIL HYDROCHLORIDE 10 MILLIGRAM(S): 10 TABLET, FILM COATED ORAL at 21:07

## 2022-07-30 NOTE — PROGRESS NOTE ADULT - SUBJECTIVE AND OBJECTIVE BOX
Cc: Gait dysfunction    HPI: Patient seen and examined at bedside. No acute events overnight.   Pain controlled, no chest pain, no N/V, no Fevers/Chills. No other new ROS  Has been tolerating rehabilitation program.    acetaminophen     Tablet .. 650 milliGRAM(s) Oral every 6 hours PRN  aluminum hydroxide/magnesium hydroxide/simethicone Suspension 30 milliLiter(s) Oral every 4 hours PRN  amLODIPine   Tablet 10 milliGRAM(s) Oral daily  apixaban 5 milliGRAM(s) Oral two times a day  AQUAPHOR (petrolatum Ointment) 1 Application(s) Topical daily  atorvastatin 80 milliGRAM(s) Oral at bedtime  bisacodyl 5 milliGRAM(s) Oral daily  donepezil 10 milliGRAM(s) Oral at bedtime  famotidine    Tablet 20 milliGRAM(s) Oral daily  hemorrhoidal Ointment 1 Application(s) Rectal daily  levothyroxine 100 MICROGram(s) Oral daily  lidocaine   4% Patch 1 Patch Transdermal daily  LORazepam     Tablet 0.5 milliGRAM(s) Oral daily PRN  memantine 10 milliGRAM(s) Oral two times a day  metoprolol succinate ER 50 milliGRAM(s) Oral daily  mirtazapine 7.5 milliGRAM(s) Oral at bedtime  polyethylene glycol 3350 17 Gram(s) Oral daily  QUEtiapine 25 milliGRAM(s) Oral at bedtime  ramelteon 8 milliGRAM(s) Oral at bedtime      T(C): 36.6 (07-30-22 @ 07:25), Max: 36.7 (07-29-22 @ 19:26)  HR: 68 (07-30-22 @ 07:25) (62 - 77)  BP: 146/60 (07-30-22 @ 07:25) (146/60 - 158/71)  RR: 15 (07-30-22 @ 07:25) (15 - 16)  SpO2: 99% (07-30-22 @ 07:25) (98% - 99%)    In NAD  HEENT- EOMI  Heart- S1S2  Lungs- CTA bl.  Abd- + BS, NT  Ext- No calf pain  Neuro- Exam unchanged          Imp: Patient with diagnosis of R MCA CVA admitted for comprehensive acute rehabilitation.    Plan:  - Continue PT/OT/SLP as indicated  - DVT prophylaxis  - Skin- Turn q2h, check skin daily  - Continue current medications  -Active issues-   CVA - Eliquis/Statin until 8/15 then switch to Statin + ASA  HTN - Continue Norvasc/Metoprolol, monitor  Insomnia- now on rozerem, will continue  - Patient is stable to continue current rehabilitation program.

## 2022-07-31 PROCEDURE — 99232 SBSQ HOSP IP/OBS MODERATE 35: CPT

## 2022-07-31 RX ADMIN — AMLODIPINE BESYLATE 10 MILLIGRAM(S): 2.5 TABLET ORAL at 05:17

## 2022-07-31 RX ADMIN — FAMOTIDINE 20 MILLIGRAM(S): 10 INJECTION INTRAVENOUS at 12:33

## 2022-07-31 RX ADMIN — Medication 650 MILLIGRAM(S): at 04:12

## 2022-07-31 RX ADMIN — Medication 650 MILLIGRAM(S): at 12:47

## 2022-07-31 RX ADMIN — ATORVASTATIN CALCIUM 80 MILLIGRAM(S): 80 TABLET, FILM COATED ORAL at 21:42

## 2022-07-31 RX ADMIN — MEMANTINE HYDROCHLORIDE 10 MILLIGRAM(S): 10 TABLET ORAL at 05:17

## 2022-07-31 RX ADMIN — Medication 100 MICROGRAM(S): at 05:17

## 2022-07-31 RX ADMIN — Medication 650 MILLIGRAM(S): at 12:33

## 2022-07-31 RX ADMIN — Medication 8 MILLIGRAM(S): at 21:42

## 2022-07-31 RX ADMIN — APIXABAN 5 MILLIGRAM(S): 2.5 TABLET, FILM COATED ORAL at 05:17

## 2022-07-31 RX ADMIN — LIDOCAINE 1 PATCH: 4 CREAM TOPICAL at 00:02

## 2022-07-31 RX ADMIN — QUETIAPINE FUMARATE 25 MILLIGRAM(S): 200 TABLET, FILM COATED ORAL at 18:42

## 2022-07-31 RX ADMIN — MEMANTINE HYDROCHLORIDE 10 MILLIGRAM(S): 10 TABLET ORAL at 18:42

## 2022-07-31 RX ADMIN — Medication 650 MILLIGRAM(S): at 05:12

## 2022-07-31 RX ADMIN — DONEPEZIL HYDROCHLORIDE 10 MILLIGRAM(S): 10 TABLET, FILM COATED ORAL at 21:42

## 2022-07-31 RX ADMIN — Medication 1 APPLICATION(S): at 12:35

## 2022-07-31 RX ADMIN — Medication 50 MILLIGRAM(S): at 05:17

## 2022-07-31 RX ADMIN — MIRTAZAPINE 7.5 MILLIGRAM(S): 45 TABLET, ORALLY DISINTEGRATING ORAL at 21:42

## 2022-07-31 RX ADMIN — Medication 30 MILLILITER(S): at 00:01

## 2022-07-31 RX ADMIN — APIXABAN 5 MILLIGRAM(S): 2.5 TABLET, FILM COATED ORAL at 18:42

## 2022-07-31 NOTE — PROGRESS NOTE ADULT - SUBJECTIVE AND OBJECTIVE BOX
Cc: Gait dysfunction    HPI: Patient seen and examined at bedside. No acute events overnight.   Pain controlled, no chest pain, no N/V, no Fevers/Chills. No other new ROS  Has been tolerating rehabilitation program.    acetaminophen     Tablet .. 650 milliGRAM(s) Oral every 6 hours PRN  aluminum hydroxide/magnesium hydroxide/simethicone Suspension 30 milliLiter(s) Oral every 4 hours PRN  amLODIPine   Tablet 10 milliGRAM(s) Oral daily  apixaban 5 milliGRAM(s) Oral two times a day  AQUAPHOR (petrolatum Ointment) 1 Application(s) Topical daily  atorvastatin 80 milliGRAM(s) Oral at bedtime  bisacodyl 5 milliGRAM(s) Oral daily  donepezil 10 milliGRAM(s) Oral at bedtime  famotidine    Tablet 20 milliGRAM(s) Oral daily  hemorrhoidal Ointment 1 Application(s) Rectal daily  levothyroxine 100 MICROGram(s) Oral daily  lidocaine   4% Patch 1 Patch Transdermal daily  LORazepam     Tablet 0.5 milliGRAM(s) Oral daily PRN  memantine 10 milliGRAM(s) Oral two times a day  metoprolol succinate ER 50 milliGRAM(s) Oral daily  mirtazapine 7.5 milliGRAM(s) Oral at bedtime  polyethylene glycol 3350 17 Gram(s) Oral daily  QUEtiapine 25 milliGRAM(s) Oral <User Schedule>  ramelteon 8 milliGRAM(s) Oral at bedtime      T(C): 36.3 (07-31-22 @ 08:53), Max: 36.5 (07-30-22 @ 19:53)  HR: 64 (07-31-22 @ 08:53) (64 - 80)  BP: 141/61 (07-31-22 @ 08:53) (132/69 - 166/80)  RR: 16 (07-31-22 @ 08:53) (16 - 16)  SpO2: 99% (07-31-22 @ 08:53) (99% - 99%)    In NAD  HEENT- EOMI  Heart- S1S2  Lungs- CTA bl.  Abd- + BS, NT  Ext- No calf pain  Neuro- Exam unchanged      Imp: Patient with diagnosis of R MCA CVA admitted for comprehensive acute rehabilitation.    Plan:  - Continue PT/OT/SLP as indicated  - DVT prophylaxis  - Skin- Turn q2h, check skin daily  - Continue current medications  -Active issues-   CVA - Eliquis/Statin until 8/15 then switch to Statin + ASA  HTN - Continue Norvasc/Metoprolol, monitor  Insomnia- now on rozerem, sleeping better,  will continue  - Patient is stable to continue current rehabilitation program.

## 2022-07-31 NOTE — CHART NOTE - NSCHARTNOTEFT_GEN_A_CORE
[facility] Plan of Care    REHABILITATION DIAGNOSIS:  Cerebral infarction          COMORBIDITIES/COMPLICATING CONDITIONS IMPACTING REHABILITATION:  HEALTH ISSUES - PROBLEM Dx:        PAST MEDICAL & SURGICAL HISTORY:  Depressive disorder, not elsewhere classified      Hypertension      CVA (cerebral vascular accident)      No significant past surgical history          Based upon consideration of the patient's impairments, functional status, complicating conditions and any other contributing factors and after information garnered from the assessments of all therapy disciplines involved in treating the patient and other pertinent clinicians:    INTERDISCIPLINARY REHABILITATION INTERVENTIONS:    [ X  ] Transfer Training  [ X  ] Bed Mobility  [ X  ] Therapeutic Exercise  [ X ] Balance/Coordination Exercises  [ X ] Locomotion retraining  [ X  ] Stairs  [  X ] Functional Transfer Training  [ X  ] Bowel/Bladder program  [ X  ] Pain Management  [ X  ] Skin/Wound Care  [ X  ] Visual/Perceptual Training  [ X  ] Therapeutic Recreation Activities  [  X ] Neuromuscular Re-education  [ X  ] Activities of Daily Living  [ X  ] Speech Exercise  [X   ] Swallowing Exercises  [   ] Vital Stim  [   ] Dietary Supplements  [   ] Calorie Count  [ X  ] Cognitive Exercises  [  X ] Congnitive/Linguistic Treatment  [  x ] Behavior Program  [  x ] Neuropsych Therapy  [ X  ] Patient/Family Counseling  [ X ] Family Training  [ X  ] Community Re-entry  [   ] Orthotic Evaluation  [   ] Prosthetic Eval/Training    MEDICAL PROGNOSIS: good      REHAB POTENTIAL:  Good    EXPECTED DAILY THERAPY:  3 hours/day           ESTIMATED LOS:  [  ] 5-7 Days  [  ] 7-10 Days  [ x ] 10- 14 Days  [  ] 14- 18 Days  [  ] 18- 21 Days    ESTIMATED DISPOSITION:  [  ] Home   [ x ] Home with Outpatient Therapies  [x ] Home with Home Therapies  [  ] Assisted Living  [  ] Nursing Home  [  ] Long Term Acute Care    INTERDISCIPLINARY FUNCTIONAL OUTCOMES/GOALS:         Gait/Mobility:  5       Transfers:  5       ADLs:  5       Functional Transfers:  5       Medication Management:  4-5       Communication:  5       Cognitive:  4-5       Dysphagia: 5       Bladder 7       Bowel:   7     Functional Independent Measures:   7 = Independent  6 = Modified Independent  5 = Supervision  4 = Minimal Assist/ Contact Guard  3 = Moderate Assistance  2 = Maximum Assistance  1 = Total Assistance  0 = Unable to assess

## 2022-08-01 LAB
ALBUMIN SERPL ELPH-MCNC: 2.7 G/DL — LOW (ref 3.3–5)
ALP SERPL-CCNC: 88 U/L — SIGNIFICANT CHANGE UP (ref 40–120)
ALT FLD-CCNC: 84 U/L — HIGH (ref 10–45)
ANION GAP SERPL CALC-SCNC: 9 MMOL/L — SIGNIFICANT CHANGE UP (ref 5–17)
AST SERPL-CCNC: 48 U/L — HIGH (ref 10–40)
BASOPHILS # BLD AUTO: 0.02 K/UL — SIGNIFICANT CHANGE UP (ref 0–0.2)
BASOPHILS NFR BLD AUTO: 0.4 % — SIGNIFICANT CHANGE UP (ref 0–2)
BILIRUB SERPL-MCNC: 0.5 MG/DL — SIGNIFICANT CHANGE UP (ref 0.2–1.2)
BUN SERPL-MCNC: 22 MG/DL — SIGNIFICANT CHANGE UP (ref 7–23)
CALCIUM SERPL-MCNC: 8.4 MG/DL — SIGNIFICANT CHANGE UP (ref 8.4–10.5)
CHLORIDE SERPL-SCNC: 101 MMOL/L — SIGNIFICANT CHANGE UP (ref 96–108)
CO2 SERPL-SCNC: 23 MMOL/L — SIGNIFICANT CHANGE UP (ref 22–31)
CREAT SERPL-MCNC: 0.98 MG/DL — SIGNIFICANT CHANGE UP (ref 0.5–1.3)
EGFR: 76 ML/MIN/1.73M2 — SIGNIFICANT CHANGE UP
EOSINOPHIL # BLD AUTO: 0.27 K/UL — SIGNIFICANT CHANGE UP (ref 0–0.5)
EOSINOPHIL NFR BLD AUTO: 4.9 % — SIGNIFICANT CHANGE UP (ref 0–6)
GLUCOSE SERPL-MCNC: 87 MG/DL — SIGNIFICANT CHANGE UP (ref 70–99)
HCT VFR BLD CALC: 31.8 % — LOW (ref 39–50)
HGB BLD-MCNC: 10.5 G/DL — LOW (ref 13–17)
IMM GRANULOCYTES NFR BLD AUTO: 0.2 % — SIGNIFICANT CHANGE UP (ref 0–1.5)
LYMPHOCYTES # BLD AUTO: 1.38 K/UL — SIGNIFICANT CHANGE UP (ref 1–3.3)
LYMPHOCYTES # BLD AUTO: 25.1 % — SIGNIFICANT CHANGE UP (ref 13–44)
MCHC RBC-ENTMCNC: 27.3 PG — SIGNIFICANT CHANGE UP (ref 27–34)
MCHC RBC-ENTMCNC: 33 GM/DL — SIGNIFICANT CHANGE UP (ref 32–36)
MCV RBC AUTO: 82.6 FL — SIGNIFICANT CHANGE UP (ref 80–100)
MONOCYTES # BLD AUTO: 0.5 K/UL — SIGNIFICANT CHANGE UP (ref 0–0.9)
MONOCYTES NFR BLD AUTO: 9.1 % — SIGNIFICANT CHANGE UP (ref 2–14)
NEUTROPHILS # BLD AUTO: 3.32 K/UL — SIGNIFICANT CHANGE UP (ref 1.8–7.4)
NEUTROPHILS NFR BLD AUTO: 60.3 % — SIGNIFICANT CHANGE UP (ref 43–77)
NRBC # BLD: 0 /100 WBCS — SIGNIFICANT CHANGE UP (ref 0–0)
PLATELET # BLD AUTO: 217 K/UL — SIGNIFICANT CHANGE UP (ref 150–400)
POTASSIUM SERPL-MCNC: 4.3 MMOL/L — SIGNIFICANT CHANGE UP (ref 3.5–5.3)
POTASSIUM SERPL-SCNC: 4.3 MMOL/L — SIGNIFICANT CHANGE UP (ref 3.5–5.3)
PROT SERPL-MCNC: 6.8 G/DL — SIGNIFICANT CHANGE UP (ref 6–8.3)
RBC # BLD: 3.85 M/UL — LOW (ref 4.2–5.8)
RBC # FLD: 12.2 % — SIGNIFICANT CHANGE UP (ref 10.3–14.5)
SODIUM SERPL-SCNC: 133 MMOL/L — LOW (ref 135–145)
WBC # BLD: 5.5 K/UL — SIGNIFICANT CHANGE UP (ref 3.8–10.5)
WBC # FLD AUTO: 5.5 K/UL — SIGNIFICANT CHANGE UP (ref 3.8–10.5)

## 2022-08-01 PROCEDURE — 99232 SBSQ HOSP IP/OBS MODERATE 35: CPT

## 2022-08-01 RX ORDER — LOSARTAN POTASSIUM 100 MG/1
12.5 TABLET, FILM COATED ORAL DAILY
Refills: 0 | Status: DISCONTINUED | OUTPATIENT
Start: 2022-08-01 | End: 2022-08-06

## 2022-08-01 RX ADMIN — Medication 1 APPLICATION(S): at 11:35

## 2022-08-01 RX ADMIN — APIXABAN 5 MILLIGRAM(S): 2.5 TABLET, FILM COATED ORAL at 17:52

## 2022-08-01 RX ADMIN — FAMOTIDINE 20 MILLIGRAM(S): 10 INJECTION INTRAVENOUS at 11:38

## 2022-08-01 RX ADMIN — Medication 8 MILLIGRAM(S): at 21:14

## 2022-08-01 RX ADMIN — APIXABAN 5 MILLIGRAM(S): 2.5 TABLET, FILM COATED ORAL at 06:24

## 2022-08-01 RX ADMIN — DONEPEZIL HYDROCHLORIDE 10 MILLIGRAM(S): 10 TABLET, FILM COATED ORAL at 21:14

## 2022-08-01 RX ADMIN — Medication 100 MICROGRAM(S): at 06:24

## 2022-08-01 RX ADMIN — LOSARTAN POTASSIUM 12.5 MILLIGRAM(S): 100 TABLET, FILM COATED ORAL at 17:27

## 2022-08-01 RX ADMIN — Medication 50 MILLIGRAM(S): at 06:24

## 2022-08-01 RX ADMIN — MEMANTINE HYDROCHLORIDE 10 MILLIGRAM(S): 10 TABLET ORAL at 17:52

## 2022-08-01 RX ADMIN — MEMANTINE HYDROCHLORIDE 10 MILLIGRAM(S): 10 TABLET ORAL at 06:24

## 2022-08-01 RX ADMIN — AMLODIPINE BESYLATE 10 MILLIGRAM(S): 2.5 TABLET ORAL at 06:24

## 2022-08-01 RX ADMIN — Medication 5 MILLIGRAM(S): at 11:38

## 2022-08-01 RX ADMIN — ATORVASTATIN CALCIUM 80 MILLIGRAM(S): 80 TABLET, FILM COATED ORAL at 21:14

## 2022-08-01 RX ADMIN — MIRTAZAPINE 7.5 MILLIGRAM(S): 45 TABLET, ORALLY DISINTEGRATING ORAL at 21:14

## 2022-08-01 RX ADMIN — QUETIAPINE FUMARATE 25 MILLIGRAM(S): 200 TABLET, FILM COATED ORAL at 17:52

## 2022-08-01 NOTE — PROGRESS NOTE ADULT - ASSESSMENT
MIGUEL ORDONEZ is a 83 yo Male with PMH of thyroid ca s/p thyroidectomy 2018, HTN, TIA (15-20 years ago), alzheimer's disease, and depression presented to [facility] ED on 7/15 with acute Left facial droop/ L sided weakness and slurring of speech, found to have Right MCA CVA s/p tPA. H/C complicated by COVID infection and sundowning.  Admitted to Acute rehabilitation with cognitive deficits, dysarthria, gait and ADL impairments.     COMORBIDITES/ACTIVE MEDICAL ISSUES     Gait Instability, ADL impairments and Functional impairments: start Comprehensive Rehab Program of PT/OT & speech therapy    #CVA of R MCA  -Embolic secondary to ESUS vs. hypercoagulable state secondary to COVID 19+   s/p tPA   Eliquis/Statin to continue until 8/15, then statin+ Aspirin 8/15.   TTE - EF 70%, minimal AR    - ILR 7/27  -PT/OT/SLP evaluations to begin  -fall and aspiration precautions    # Hypertension.   c/w amlodipine  c/w metoprolol   -monitor BPs.  --SBP goal 120-150      # 2019 novel coronavirus disease (COVID-19).   - on Eliquis for hypercoagulable state  - supplemental O2 as needed.    #dementia  -continue donepezil, memantine, and seroquel-- pt's home medication.    -neuropsych eval, strict fall precautions, reorient    depression--  --Cont. Seroquel-- pt. was on this medication at home for depression.   --Cont. mirtazepine.    #dysphagia/dysarthria  - downgraded to minced and moist diet per SLP  - Vital Stim during SLP  - May need repeat MBSS    #Sleep/Insomnia  - trial Rozerem 7/29    Pain Mgmt - Tylenol PRN  GI/Bowel Mgmt -  Senna,  Miralax PRN  /Bladder Mgmt - Voiding independently, Check PVR     Precautions / PROPHYLAXIS:   - Falls, Cardiac, Seizure   - Lungs: Aspiration,  -- DVT: on Eliquis     MIGUEL ORDONEZ is a 83 yo Male with PMH of thyroid ca s/p thyroidectomy 2018, HTN, TIA (15-20 years ago), alzheimer's disease, and depression presented to [facility] ED on 7/15 with acute Left facial droop/ L sided weakness and slurring of speech, found to have Right MCA CVA s/p tPA. H/C complicated by COVID infection and sundowning.  Admitted to Acute rehabilitation with cognitive deficits, dysarthria, gait and ADL impairments.     COMORBIDITES/ACTIVE MEDICAL ISSUES     Gait Instability, ADL impairments and Functional impairments: start Comprehensive Rehab Program of PT/OT & speech therapy    #CVA of R MCA  -Embolic secondary to ESUS vs. hypercoagulable state secondary to COVID 19+   s/p tPA   Eliquis/Statin to continue until 8/15, then statin+ Aspirin 8/15.   TTE - EF 70%, minimal AR    - ILR 7/27  -PT/OT/SLP evaluations to begin  -fall and aspiration precautions    # Hypertension.   c/w amlodipine  c/w metoprolol   -monitor BPs.  --SBP goal 120-150    #hyponatremia  -Encourage PO fluids  -Will continue to monitor    # 2019 novel coronavirus disease (COVID-19).   - on Eliquis for hypercoagulable state  - supplemental O2 as needed.    #Dementia  -continue donepezil, memantine, and seroquel-- pt's home medication.    -neuropsych eval, strict fall precautions, reorient    #Depression--  --Cont. Seroquel-- pt. was on this medication at home for depression.   --Cont. mirtazepine.    #Dysphagia/dysarthria  - downgraded to minced and moist diet per SLP  - Vital Stim during SLP  - May need repeat MBSS    #Sleep/Insomnia  - Rozerem    Pain Mgmt - Tylenol PRN  GI/Bowel Mgmt -  Senna,  Miralax PRN  /Bladder Mgmt - Voiding independently, Check PVR     Precautions / PROPHYLAXIS:   - Falls, Cardiac, Seizure   - Lungs: Aspiration,  -- DVT: on Eliquis MIGUEL ORDONEZ is a 83 yo Male with PMH of thyroid ca s/p thyroidectomy 2018, HTN, TIA (15-20 years ago), alzheimer's disease, and depression presented to [facility] ED on 7/15 with acute Left facial droop/ L sided weakness and slurring of speech, found to have Right MCA CVA s/p tPA. H/C complicated by COVID infection and sundowning.  Admitted to Acute rehabilitation with cognitive deficits, dysarthria, gait and ADL impairments.     COMORBIDITES/ACTIVE MEDICAL ISSUES     Gait Instability, ADL impairments and Functional impairments: cont Comprehensive Rehab Program of PT/OT & speech therapy    #CVA of R MCA  -Embolic secondary to ESUS vs. hypercoagulable state secondary to COVID 19+   s/p tPA   Eliquis/Statin to continue until 8/15, then statin+ Aspirin 8/15.   TTE - EF 70%, minimal AR    - ILR 7/27  -PT/OT/SLP evaluations to begin  -fall and aspiration precautions    # Hypertension.   c/w amlodipine  c/w metoprolol   -monitor BPs.  --SBP goal 120-150    #hyponatremia  -Encourage PO fluids  -Will continue to monitor    # 2019 novel coronavirus disease (COVID-19).   - on Eliquis for hypercoagulable state  - supplemental O2 as needed.    #Dementia  -continue donepezil, memantine, and seroquel-- pt's home medication.    -neuropsych eval, strict fall precautions, reorient    #Depression--  --Cont. Seroquel-- pt. was on this medication at home for depression.   --Cont. mirtazepine.    #Dysphagia/dysarthria  - downgraded to minced and moist diet per SLP  - Vital Stim during SLP  - May need repeat MBSS    #Sleep/Insomnia  - Rozerem    Pain Mgmt - Tylenol PRN  GI/Bowel Mgmt -  Senna,  Miralax PRN  /Bladder Mgmt - Voiding independently, Check PVR     Precautions / PROPHYLAXIS:   - Falls, Cardiac, Seizure   - Lungs: Aspiration,  -- DVT: on Eliquis

## 2022-08-01 NOTE — PROGRESS NOTE ADULT - SUBJECTIVE AND OBJECTIVE BOX
Patient is a 82y old  Male who presents with a chief complaint of s/p right MCA CVA (01 Aug 2022 14:18)      Subjective and overnight events:  Patient seen and examined at bedside. no complaints. no fever, chills, sob, cp, abd pain.    ALLERGIES:  No Known Allergies    MEDICATIONS  (STANDING):  amLODIPine   Tablet 10 milliGRAM(s) Oral daily  apixaban 5 milliGRAM(s) Oral two times a day  AQUAPHOR (petrolatum Ointment) 1 Application(s) Topical daily  atorvastatin 80 milliGRAM(s) Oral at bedtime  bisacodyl 5 milliGRAM(s) Oral daily  donepezil 10 milliGRAM(s) Oral at bedtime  famotidine    Tablet 20 milliGRAM(s) Oral daily  hemorrhoidal Ointment 1 Application(s) Rectal daily  levothyroxine 100 MICROGram(s) Oral daily  lidocaine   4% Patch 1 Patch Transdermal daily  memantine 10 milliGRAM(s) Oral two times a day  metoprolol succinate ER 50 milliGRAM(s) Oral daily  mirtazapine 7.5 milliGRAM(s) Oral at bedtime  polyethylene glycol 3350 17 Gram(s) Oral daily  QUEtiapine 25 milliGRAM(s) Oral <User Schedule>  ramelteon 8 milliGRAM(s) Oral at bedtime    MEDICATIONS  (PRN):  acetaminophen     Tablet .. 650 milliGRAM(s) Oral every 6 hours PRN Temp greater or equal to 38C (100.4F), Mild Pain (1 - 3), Moderate Pain (4 - 6), Severe Pain (7 - 10)  aluminum hydroxide/magnesium hydroxide/simethicone Suspension 30 milliLiter(s) Oral every 4 hours PRN Dyspepsia  LORazepam     Tablet 0.5 milliGRAM(s) Oral daily PRN Agitation    Vital Signs Last 24 Hrs  T(F): 97.6 (01 Aug 2022 08:42), Max: 97.6 (01 Aug 2022 08:42)  HR: 61 (01 Aug 2022 08:42) (61 - 65)  BP: 129/64 (01 Aug 2022 08:42) (128/66 - 172/68)  RR: 16 (01 Aug 2022 08:42) (16 - 16)  SpO2: 98% (01 Aug 2022 08:42) (98% - 100%)  I&O's Summary    PHYSICAL EXAM:  General: NAD, A/O x 3  ENT: MMM  Neck: Supple, No JVD  Lungs: Clear to auscultation bilaterally  Cardio: RRR, S1/S2, No murmurs  Abdomen: Soft, Nontender, Nondistended; Bowel sounds present  Extremities: No calf tenderness, No pitting edema    LABS:                        10.5   5.50  )-----------( 217      ( 01 Aug 2022 06:14 )             31.8     08-01    133  |  101  |  22  ----------------------------<  87  4.3   |  23  |  0.98    Ca    8.4      01 Aug 2022 06:14    TPro  6.8  /  Alb  2.7  /  TBili  0.5  /  DBili  x   /  AST  48  /  ALT  84  /  AlkPhos  88  08-01          07-16 Chol 189 mg/dL LDL -- HDL 61 mg/dL Trig 102 mg/dL        Culture - Blood (collected 25 Jul 2022 19:01)  Source: .Blood Blood-Venous  Final Report (30 Jul 2022 23:01):    No Growth Final    Culture - Blood (collected 25 Jul 2022 19:01)  Source: .Blood Blood-Peripheral  Final Report (30 Jul 2022 22:01):    No Growth Final      COVID-19 PCR: NotDetec (07-27-22 @ 17:20)  COVID-19 PCR: NotDetec (07-26-22 @ 06:07)  COVID-19 PCR: Detected (07-19-22 @ 17:00)  COVID-19 PCR: Detected (07-16-22 @ 00:50)  COVID-19 PCR: Detected (07-16-22 @ 00:41)      RADIOLOGY & ADDITIONAL TESTS:    Care Discussed with Consultants/Other Providers:     Patient is a 82y old  Male who presents with a chief complaint of s/p right MCA CVA (01 Aug 2022 14:18)      Subjective and overnight events:  Patient seen and examined at bedside. no complaints. no fever, chills, sob, cp, abd pain.    ALLERGIES:  No Known Allergies    MEDICATIONS  (STANDING):  amLODIPine   Tablet 10 milliGRAM(s) Oral daily  apixaban 5 milliGRAM(s) Oral two times a day  AQUAPHOR (petrolatum Ointment) 1 Application(s) Topical daily  atorvastatin 80 milliGRAM(s) Oral at bedtime  bisacodyl 5 milliGRAM(s) Oral daily  donepezil 10 milliGRAM(s) Oral at bedtime  famotidine    Tablet 20 milliGRAM(s) Oral daily  hemorrhoidal Ointment 1 Application(s) Rectal daily  levothyroxine 100 MICROGram(s) Oral daily  lidocaine   4% Patch 1 Patch Transdermal daily  memantine 10 milliGRAM(s) Oral two times a day  metoprolol succinate ER 50 milliGRAM(s) Oral daily  mirtazapine 7.5 milliGRAM(s) Oral at bedtime  polyethylene glycol 3350 17 Gram(s) Oral daily  QUEtiapine 25 milliGRAM(s) Oral <User Schedule>  ramelteon 8 milliGRAM(s) Oral at bedtime    MEDICATIONS  (PRN):  acetaminophen     Tablet .. 650 milliGRAM(s) Oral every 6 hours PRN Temp greater or equal to 38C (100.4F), Mild Pain (1 - 3), Moderate Pain (4 - 6), Severe Pain (7 - 10)  aluminum hydroxide/magnesium hydroxide/simethicone Suspension 30 milliLiter(s) Oral every 4 hours PRN Dyspepsia  LORazepam     Tablet 0.5 milliGRAM(s) Oral daily PRN Agitation    Vital Signs Last 24 Hrs  T(F): 97.6 (01 Aug 2022 08:42), Max: 97.6 (01 Aug 2022 08:42)  HR: 61 (01 Aug 2022 08:42) (61 - 65)  BP: 129/64 (01 Aug 2022 08:42) (128/66 - 172/68)  RR: 16 (01 Aug 2022 08:42) (16 - 16)  SpO2: 98% (01 Aug 2022 08:42) (98% - 100%)  I&O's Summary    PHYSICAL EXAM:  General: NAD, A/O x 3  ENT: MMM  Neck: Supple, No JVD  Lungs: Clear to auscultation bilaterally  Cardio: RRR, S1/S2, No murmurs  Abdomen: Soft, Nontender, Nondistended; Bowel sounds present  Extremities: No calf tenderness, No pitting edema    LABS:                        10.5   5.50  )-----------( 217      ( 01 Aug 2022 06:14 )             31.8     08-01    133  |  101  |  22  ----------------------------<  87  4.3   |  23  |  0.98    Ca    8.4      01 Aug 2022 06:14    TPro  6.8  /  Alb  2.7  /  TBili  0.5  /  DBili  x   /  AST  48  /  ALT  84  /  AlkPhos  88  08-01      07-16 Chol 189 mg/dL LDL -- HDL 61 mg/dL Trig 102 mg/dL    Culture - Blood (collected 25 Jul 2022 19:01)  Source: .Blood Blood-Venous  Final Report (30 Jul 2022 23:01):    No Growth Final    Culture - Blood (collected 25 Jul 2022 19:01)  Source: .Blood Blood-Peripheral  Final Report (30 Jul 2022 22:01):    No Growth Final      COVID-19 PCR: NotDetec (07-27-22 @ 17:20)  COVID-19 PCR: NotDetec (07-26-22 @ 06:07)  COVID-19 PCR: Detected (07-19-22 @ 17:00)  COVID-19 PCR: Detected (07-16-22 @ 00:50)  COVID-19 PCR: Detected (07-16-22 @ 00:41)      RADIOLOGY & ADDITIONAL TESTS:    Care Discussed with Consultants/Other Providers:

## 2022-08-01 NOTE — PROGRESS NOTE ADULT - ASSESSMENT
MIGUEL ORDONEZ is a 83 yo Male with PMH of thyroid ca s/p thyroidectomy 2018, HTN, TIA (15-20 years ago), alzheimer's disease, and depression presented to [facility] ED on 7/15 with acute Left facial droop/ L sided weakness and slurring of speech, found to have Right MCA CVA s/p tPA. H/C complicated by COVID infection and sundowning.  Admitted to Acute rehabilitation with cognitive deficits, dysarthria, gait and ADL impairments.       #CVA of R MCA  -Embolic secondary to ESUS vs. hypercoagulable state secondary to COVID 19+   s/p tPA   Eliquis/Statin to continue until 8/15, then statin+ Aspirin 8/15.   TTE - EF 70%, minimal AR   -ILR 7/27  -PT/OT/SLP evaluations to begin  -fall and aspiration precautions    # Hypertension.   -BP labile, intermittent elevated SBP up to 170  change diet to low salt diet  add losartan 12.5mg  c/w amlodipine  c/w metoprolol   monitor BPs      # 2019 novel coronavirus disease (COVID-19).   - on Eliquis for hypercoagulable state  - supplemental O2 as needed.    #dementia  -continue regimen, evaluated by psych prior rehab for periods of agitation  -neuropsych eval, strict fall precautions, reorient    #hypoantremia  -stable  -monitor      DVT: on Eliquis   MIGUEL ORDONEZ is a 81 yo Male with PMH of thyroid ca s/p thyroidectomy 2018, HTN, TIA (15-20 years ago), alzheimer's disease, and depression presented to [facility] ED on 7/15 with acute Left facial droop/ L sided weakness and slurring of speech, found to have Right MCA CVA s/p tPA. H/C complicated by COVID infection and sundowning.  Admitted to Acute rehabilitation with cognitive deficits, dysarthria, gait and ADL impairments.       #CVA of R MCA  -Embolic secondary to ESUS vs. hypercoagulable state secondary to COVID 19+   s/p tPA   Eliquis/Statin to continue until 8/15, then statin+ Aspirin 8/15.   TTE - EF 70%, minimal AR   -ILR 7/27  -PT/OT/SLP evaluations to begin  -fall and aspiration precautions    # Hypertension.   -BP labile, intermittent elevated SBP up to 170  change diet to salt restricted diet  add losartan 12.5mg  c/w amlodipine  c/w metoprolol   monitor BPs      # 2019 novel coronavirus disease (COVID-19).   - on Eliquis for hypercoagulable state  - supplemental O2 as needed.    #dementia  -continue regimen, evaluated by psych prior rehab for periods of agitation  -neuropsych eval, strict fall precautions, reorient    #hypoantremia  -stable  -monitor      DVT: on Eliquis

## 2022-08-01 NOTE — PROGRESS NOTE ADULT - NS ATTEND AMEND GEN_ALL_CORE FT
Pt. seen with NP.  Agree with documentation above as per NP with amendments made as appropriate. Patient medically stable. Making progress towards rehab goals.     right MCA infarct  cont. current meds.  check PVR

## 2022-08-01 NOTE — PROGRESS NOTE ADULT - SUBJECTIVE AND OBJECTIVE BOX
INTERVAL HPI/OVERNIGHT EVENTS:  Chart Reviewed and patient seen at bedside.  Pt denied trouble sleeping last night, but we were later informed by his SLP that he reports trouble sleeping last night.  Otherwise has a great appetite.    ROS:  Denies fevers, chills, chest pain, shortness of breath, abdominal pain, headaches, nausea/vomiting    Vital Signs Last 24 Hrs  T(C): 36.4 (29 Jul 2022 09:03), Max: 36.8 (28 Jul 2022 20:29)  T(F): 97.5 (29 Jul 2022 09:03), Max: 98.2 (28 Jul 2022 20:29)  HR: 88 (29 Jul 2022 09:03) (68 - 88)  BP: 122/76 (29 Jul 2022 09:03) (122/76 - 161/70)  BP(mean): --  RR: 15 (29 Jul 2022 09:03) (15 - 16)  SpO2: 96% (29 Jul 2022 09:03) (96% - 98%)    Parameters below as of 29 Jul 2022 09:03  Patient On (Oxygen Delivery Method): room air    Physical Exam:    Gen - NAD, Comfortable  HEENT - NCAT, EOMI, MMM  Neck - Supple, No limited ROM  Pulm - CTAB, No wheeze, No rhonchi, No crackles  Cardiovascular - RRR, S1S2, No m/r/g  Abdomen - Soft, NT/ND, +BS  Extremities - No C/C/E, No calf tenderness  Neuro-     Cognitive - AAOx2 , was not oriented to place     Communication - Fluent, Mild dysarthria     Attention: Impaired-     Judgement: impaired     Memory: Recall 3 objects immediate and 0/3 objects 3 minutes later        Cranial Nerves - Mild left field cut and dysarthria, otherwise CNIII, IV, VI intact, Sensation intact for LT V1-V3. Tongue midline and symmetric     Motor -                    LEFT    UE - ShAB 5/5, EF 5/5, EE 5/5, WE 5/5,  5/5                    RIGHT UE - ShAB 5/5, EF 5/5, EE 5/5, WE 5/5,  5/5                    LEFT    LE - HF 5/5, KE 5/5, DF 5/5, PF 5/5                    RIGHT LE - HF 5/5, KE 5/5, DF 5/5, PF 5/5        Sensory - Intact to LT     Reflexes - DTR Intact, No primitive reflex     Coordination - FTN & HTS intact     Tone - normal  Psychiatric - Mood stable, Affect Flat  Skin: no sacral decubital ulcer.      LABS:  07-28    133<L>  |  98  |  24<H>  ----------------------------<  96  4.3   |  25  |  1.19  07-27    133<L>  |  97  |  22  ----------------------------<  101<H>  4.0   |  21<L>  |  0.83    Ca    9.0      28 Jul 2022 06:49  Ca    9.3      27 Jul 2022 07:55    TPro  7.7  /  Alb  3.0<L>  /  TBili  0.7  /  DBili  x   /  AST  105<H>  /  ALT  130<H>  /  AlkPhos  85  07-28                                              11.1   7.06  )-----------( 205      ( 28 Jul 2022 06:49 )             32.3     CAPILLARY BLOOD GLUCOSE                 INTERVAL HPI/OVERNIGHT EVENTS:  Chart Reviewed and patient seen at bedside. Pt denies trouble sleeping last night, and that he "feels good." He is comfortable in bed with no complaints at this time. He expressed concern over medical bills this AM.     ROS:  Cognitive deficits. Per chart review- Denies fevers, chills, chest pain, shortness of breath, abdominal pain, headaches, nausea/vomiting    Vital Signs Last 24 Hrs  T(C): 36.4 (01 Aug 2022 08:42), Max: 36.4 (01 Aug 2022 08:42)  T(F): 97.6 (01 Aug 2022 08:42), Max: 97.6 (01 Aug 2022 08:42)  HR: 61 (01 Aug 2022 08:42) (61 - 65)  BP: 129/64 (01 Aug 2022 08:42) (128/66 - 172/68)  BP(mean): --  RR: 16 (01 Aug 2022 08:42) (16 - 16)  SpO2: 98% (01 Aug 2022 08:42) (98% - 100%)    Physical Exam:  Gen - NAD, Comfortable  HEENT - NCAT, EOMI, MMM  Neck - Supple, No limited ROM  Pulm - CTAB, No wheeze, No rhonchi, No crackles  Cardiovascular - RRR, S1S2, No m/r/g  Abdomen - Soft, NT/ND, +BS  Extremities - No C/C/E, No calf tenderness  Neuro-     Cognitive - AAOx2      Communication - Fluent, Mild dysarthria     Attention: Impaired/Distracted     Judgement: impaired   Psychiatric - Mood stable, Affect Flat  Skin: no sacral decubital ulcer.      LAB                        10.5   5.50  )-----------( 217      ( 01 Aug 2022 06:14 )             31.8     08-01    133<L>  |  101  |  22  ----------------------------<  87  4.3   |  23  |  0.98    Ca    8.4      01 Aug 2022 06:14    TPro  6.8  /  Alb  2.7<L>  /  TBili  0.5  /  DBili  x   /  AST  48<H>  /  ALT  84<H>  /  AlkPhos  88  08-01    LIVER FUNCTIONS - ( 01 Aug 2022 06:14 )  Alb: 2.7 g/dL / Pro: 6.8 g/dL / ALK PHOS: 88 U/L / ALT: 84 U/L / AST: 48 U/L / GGT: x             MEDICATIONS  (STANDING):  amLODIPine   Tablet 10 milliGRAM(s) Oral daily  apixaban 5 milliGRAM(s) Oral two times a day  AQUAPHOR (petrolatum Ointment) 1 Application(s) Topical daily  atorvastatin 80 milliGRAM(s) Oral at bedtime  bisacodyl 5 milliGRAM(s) Oral daily  donepezil 10 milliGRAM(s) Oral at bedtime  famotidine    Tablet 20 milliGRAM(s) Oral daily  hemorrhoidal Ointment 1 Application(s) Rectal daily  levothyroxine 100 MICROGram(s) Oral daily  lidocaine   4% Patch 1 Patch Transdermal daily  memantine 10 milliGRAM(s) Oral two times a day  metoprolol succinate ER 50 milliGRAM(s) Oral daily  mirtazapine 7.5 milliGRAM(s) Oral at bedtime  polyethylene glycol 3350 17 Gram(s) Oral daily  QUEtiapine 25 milliGRAM(s) Oral <User Schedule>  ramelteon 8 milliGRAM(s) Oral at bedtime    MEDICATIONS  (PRN):  acetaminophen     Tablet .. 650 milliGRAM(s) Oral every 6 hours PRN Temp greater or equal to 38C (100.4F), Mild Pain (1 - 3), Moderate Pain (4 - 6), Severe Pain (7 - 10)  aluminum hydroxide/magnesium hydroxide/simethicone Suspension 30 milliLiter(s) Oral every 4 hours PRN Dyspepsia  LORazepam     Tablet 0.5 milliGRAM(s) Oral daily PRN Agitation INTERVAL HPI/OVERNIGHT EVENTS:  Chart Reviewed and patient seen at bedside. Pt denies trouble sleeping last night, and that he "feels good." He is comfortable in bed with no complaints at this time. He expressed concern over medical bills this AM. Confused    ROS:  Cognitive deficits. Per chart review- Denies fevers, chills, chest pain, shortness of breath, abdominal pain, headaches, nausea/vomiting    Vital Signs Last 24 Hrs  T(C): 36.4 (01 Aug 2022 08:42), Max: 36.4 (01 Aug 2022 08:42)  T(F): 97.6 (01 Aug 2022 08:42), Max: 97.6 (01 Aug 2022 08:42)  HR: 61 (01 Aug 2022 08:42) (61 - 65)  BP: 129/64 (01 Aug 2022 08:42) (128/66 - 172/68)  BP(mean): --  RR: 16 (01 Aug 2022 08:42) (16 - 16)  SpO2: 98% (01 Aug 2022 08:42) (98% - 100%)    Physical Exam:  Gen - NAD, Comfortable  HEENT - NCAT, EOMI, MMM  Neck - Supple, No limited ROM  Pulm - CTAB, No wheeze, No rhonchi, No crackles  Cardiovascular - RRR, S1S2, No m/r/g  Abdomen - Soft, NT/ND, +BS  Extremities - No C/C/E, No calf tenderness  Neuro-     Cognitive - AAOx2      Communication - Fluent, Mild dysarthria     Attention: Impaired/Distracted     Judgement: impaired   Psychiatric - Mood stable, Affect Flat  Skin: no sacral decubital ulcer.      LAB                        10.5   5.50  )-----------( 217      ( 01 Aug 2022 06:14 )             31.8     08-01    133<L>  |  101  |  22  ----------------------------<  87  4.3   |  23  |  0.98    Ca    8.4      01 Aug 2022 06:14    TPro  6.8  /  Alb  2.7<L>  /  TBili  0.5  /  DBili  x   /  AST  48<H>  /  ALT  84<H>  /  AlkPhos  88  08-01    LIVER FUNCTIONS - ( 01 Aug 2022 06:14 )  Alb: 2.7 g/dL / Pro: 6.8 g/dL / ALK PHOS: 88 U/L / ALT: 84 U/L / AST: 48 U/L / GGT: x             MEDICATIONS  (STANDING):  amLODIPine   Tablet 10 milliGRAM(s) Oral daily  apixaban 5 milliGRAM(s) Oral two times a day  AQUAPHOR (petrolatum Ointment) 1 Application(s) Topical daily  atorvastatin 80 milliGRAM(s) Oral at bedtime  bisacodyl 5 milliGRAM(s) Oral daily  donepezil 10 milliGRAM(s) Oral at bedtime  famotidine    Tablet 20 milliGRAM(s) Oral daily  hemorrhoidal Ointment 1 Application(s) Rectal daily  levothyroxine 100 MICROGram(s) Oral daily  lidocaine   4% Patch 1 Patch Transdermal daily  memantine 10 milliGRAM(s) Oral two times a day  metoprolol succinate ER 50 milliGRAM(s) Oral daily  mirtazapine 7.5 milliGRAM(s) Oral at bedtime  polyethylene glycol 3350 17 Gram(s) Oral daily  QUEtiapine 25 milliGRAM(s) Oral <User Schedule>  ramelteon 8 milliGRAM(s) Oral at bedtime    MEDICATIONS  (PRN):  acetaminophen     Tablet .. 650 milliGRAM(s) Oral every 6 hours PRN Temp greater or equal to 38C (100.4F), Mild Pain (1 - 3), Moderate Pain (4 - 6), Severe Pain (7 - 10)  aluminum hydroxide/magnesium hydroxide/simethicone Suspension 30 milliLiter(s) Oral every 4 hours PRN Dyspepsia  LORazepam     Tablet 0.5 milliGRAM(s) Oral daily PRN Agitation

## 2022-08-02 PROCEDURE — 99232 SBSQ HOSP IP/OBS MODERATE 35: CPT

## 2022-08-02 RX ORDER — SODIUM CHLORIDE 0.65 %
1 AEROSOL, SPRAY (ML) NASAL
Refills: 0 | Status: DISCONTINUED | OUTPATIENT
Start: 2022-08-02 | End: 2022-08-06

## 2022-08-02 RX ORDER — DONEPEZIL HYDROCHLORIDE 10 MG/1
10 TABLET, FILM COATED ORAL DAILY
Refills: 0 | Status: DISCONTINUED | OUTPATIENT
Start: 2022-08-02 | End: 2022-08-06

## 2022-08-02 RX ADMIN — APIXABAN 5 MILLIGRAM(S): 2.5 TABLET, FILM COATED ORAL at 17:11

## 2022-08-02 RX ADMIN — LOSARTAN POTASSIUM 12.5 MILLIGRAM(S): 100 TABLET, FILM COATED ORAL at 05:10

## 2022-08-02 RX ADMIN — Medication 1 APPLICATION(S): at 11:08

## 2022-08-02 RX ADMIN — Medication 8 MILLIGRAM(S): at 21:08

## 2022-08-02 RX ADMIN — POLYETHYLENE GLYCOL 3350 17 GRAM(S): 17 POWDER, FOR SOLUTION ORAL at 11:06

## 2022-08-02 RX ADMIN — PHENYLEPHRINE-SHARK LIVER OIL-MINERAL OIL-PETROLATUM RECTAL OINTMENT 1 APPLICATION(S): at 11:06

## 2022-08-02 RX ADMIN — Medication 100 MICROGRAM(S): at 05:10

## 2022-08-02 RX ADMIN — DONEPEZIL HYDROCHLORIDE 10 MILLIGRAM(S): 10 TABLET, FILM COATED ORAL at 11:06

## 2022-08-02 RX ADMIN — ATORVASTATIN CALCIUM 80 MILLIGRAM(S): 80 TABLET, FILM COATED ORAL at 21:08

## 2022-08-02 RX ADMIN — Medication 30 MILLILITER(S): at 17:11

## 2022-08-02 RX ADMIN — Medication 1 SPRAY(S): at 17:11

## 2022-08-02 RX ADMIN — Medication 650 MILLIGRAM(S): at 22:36

## 2022-08-02 RX ADMIN — AMLODIPINE BESYLATE 10 MILLIGRAM(S): 2.5 TABLET ORAL at 05:10

## 2022-08-02 RX ADMIN — MEMANTINE HYDROCHLORIDE 10 MILLIGRAM(S): 10 TABLET ORAL at 05:10

## 2022-08-02 RX ADMIN — FAMOTIDINE 20 MILLIGRAM(S): 10 INJECTION INTRAVENOUS at 11:05

## 2022-08-02 RX ADMIN — Medication 5 MILLIGRAM(S): at 11:05

## 2022-08-02 RX ADMIN — MEMANTINE HYDROCHLORIDE 10 MILLIGRAM(S): 10 TABLET ORAL at 17:12

## 2022-08-02 RX ADMIN — Medication 50 MILLIGRAM(S): at 05:10

## 2022-08-02 RX ADMIN — APIXABAN 5 MILLIGRAM(S): 2.5 TABLET, FILM COATED ORAL at 05:10

## 2022-08-02 RX ADMIN — Medication 650 MILLIGRAM(S): at 23:12

## 2022-08-02 RX ADMIN — MIRTAZAPINE 7.5 MILLIGRAM(S): 45 TABLET, ORALLY DISINTEGRATING ORAL at 21:08

## 2022-08-02 RX ADMIN — QUETIAPINE FUMARATE 25 MILLIGRAM(S): 200 TABLET, FILM COATED ORAL at 18:33

## 2022-08-02 NOTE — PROGRESS NOTE ADULT - SUBJECTIVE AND OBJECTIVE BOX
HPI:  83 yo Male with PMH of thyroid ca s/p thyroidectomy 2018, HTN, TIA (15-20 years ago), alzheimer's disease, and depression presented to [facility] ED on 7/15 with acute Left facial droop/ L sided weakness and slurring of speech. CTH 7/15 neg, MR shows right MCA infarct. S/p Tpa. No LVO/no thrombectomy. EP consulted for ILR. Eliquis started for Covid+ r/o hypercoagulable state. DDimer >2000. ID following, no indication for antivirals, asymptomatic. Transition to ASA on 8/15 (1 month post covid AC). HgA1C 6.1 %, . CXR 7/18 No focal infiltrates. No pleural effusion or pneumothorax. TTE EF70%, unremarkable, cardiac monitoring: no events. Passed dysphagia screen, aspiration precautions, s/p MBS 7/18, upgraded regular solids with thin liquids. Hyponatremia to monitor closely. Will need anticoagulation for 1 month due to elevated D-Dimer. LE doppler 7/19 and on 7/24 negative for DVT. Depression, Dementia with sundowning at baseline- home regimen Seroquel, Mirtazepine, Donepezil, Namenda. Agitated on 7/19, improved, no longer on restraints. Impression: L hemiparesis and dysarthria due to acute ischemic stroke R MCA . Mechanism is unclear, Embolic secondary to ESUS vs. hypercoagulable state secondary to COVID 19+. Eliquis given elevated D-Dimer (2086) hypercoagulable state secondary to Covid infection, switch to ASA on 08/15 for stroke prevention. TTE shows EF70%, unremarkable, ILR placed to rule out A. Fib as possible source. Evaluated by PMR and acute rehab recommended. Cleared for dc on 7/27. Evaluated by PT/OT and was recommended AR. Patient stable for discharge.             Subjective:  Pt. slept off and on during the night as per nursing. daughter reports concern regarding pt's anxiety.  Pt. reported he slept. Admits to feeling anxious and not liking being in hospital.  denies pain      VITALS  Vital Signs Last 24 Hrs  T(C): 36.4 (02 Aug 2022 07:33), Max: 36.5 (01 Aug 2022 20:00)  T(F): 97.6 (02 Aug 2022 07:33), Max: 97.7 (01 Aug 2022 20:00)  HR: 65 (02 Aug 2022 07:33) (65 - 68)  BP: 150/62 (02 Aug 2022 07:33) (143/61 - 150/62)  BP(mean): --  RR: 16 (02 Aug 2022 07:33) (16 - 16)  SpO2: 95% (02 Aug 2022 07:33) (95% - 96%)    Parameters below as of 02 Aug 2022 07:33  Patient On (Oxygen Delivery Method): room air        REVIEW OF SYMPTOMS  Neurological deficits-- Cognitive deficits.   - Denies fevers, chills, chest pain, shortness of breath, abdominal pain, headaches, nausea/vomiting        Physical Exam:  Gen - NAD, Comfortable  HEENT - NCAT, EOMI, MMM  Neck - Supple, No limited ROM  Pulm - CTAB, No wheeze, No rhonchi, No crackles  Cardiovascular - RRR, S1S2, No m/r/g  Abdomen - Soft, NT/ND, +BS  Extremities - No C/C/E, No calf tenderness  Neuro-     Cognitive - AAOx2      Communication - Fluent, Mild dysarthria     Attention: Impaired/Distracted     Judgement: impaired   Psychiatric - Mood stable, Affect Flat              RECENT LABS                        10.5   5.50  )-----------( 217      ( 01 Aug 2022 06:14 )             31.8     08-01    133<L>  |  101  |  22  ----------------------------<  87  4.3   |  23  |  0.98    Ca    8.4      01 Aug 2022 06:14    TPro  6.8  /  Alb  2.7<L>  /  TBili  0.5  /  DBili  x   /  AST  48<H>  /  ALT  84<H>  /  AlkPhos  88  08-01            RADIOLOGY/OTHER RESULTS      MEDICATIONS  (STANDING):  amLODIPine   Tablet 10 milliGRAM(s) Oral daily  apixaban 5 milliGRAM(s) Oral two times a day  AQUAPHOR (petrolatum Ointment) 1 Application(s) Topical daily  atorvastatin 80 milliGRAM(s) Oral at bedtime  bisacodyl 5 milliGRAM(s) Oral daily  donepezil 10 milliGRAM(s) Oral daily  famotidine    Tablet 20 milliGRAM(s) Oral daily  hemorrhoidal Ointment 1 Application(s) Rectal daily  levothyroxine 100 MICROGram(s) Oral daily  lidocaine   4% Patch 1 Patch Transdermal daily  losartan 12.5 milliGRAM(s) Oral daily  memantine 10 milliGRAM(s) Oral two times a day  metoprolol succinate ER 50 milliGRAM(s) Oral daily  mirtazapine 7.5 milliGRAM(s) Oral at bedtime  polyethylene glycol 3350 17 Gram(s) Oral daily  QUEtiapine 25 milliGRAM(s) Oral <User Schedule>  ramelteon 8 milliGRAM(s) Oral at bedtime    MEDICATIONS  (PRN):  acetaminophen     Tablet .. 650 milliGRAM(s) Oral every 6 hours PRN Temp greater or equal to 38C (100.4F), Mild Pain (1 - 3), Moderate Pain (4 - 6), Severe Pain (7 - 10)  aluminum hydroxide/magnesium hydroxide/simethicone Suspension 30 milliLiter(s) Oral every 4 hours PRN Dyspepsia  LORazepam     Tablet 0.5 milliGRAM(s) Oral daily PRN Agitation  sodium chloride 0.65% Nasal 1 Spray(s) Both Nostrils five times a day PRN Nasal Congestion

## 2022-08-02 NOTE — PROGRESS NOTE ADULT - ASSESSMENT
MIGUEL ORDONEZ is a 83 yo Male with PMH of thyroid ca s/p thyroidectomy 2018, HTN, TIA (15-20 years ago), alzheimer's disease, and depression presented to [facility] ED on 7/15 with acute Left facial droop/ L sided weakness and slurring of speech, found to have Right MCA CVA s/p tPA. H/C complicated by COVID infection and sundowning.  Admitted to Acute rehabilitation with cognitive deficits, dysarthria, gait and ADL impairments.     COMORBIDITES/ACTIVE MEDICAL ISSUES     Gait Instability, ADL impairments and Functional impairments: cont Comprehensive Rehab Program of PT/OT & speech therapy    #CVA of R MCA  -Embolic secondary to ESUS vs. hypercoagulable state secondary to COVID 19+   s/p tPA   Eliquis/Statin to continue until 8/15, then statin+ Aspirin 8/15.   TTE - EF 70%, minimal AR    - ILR 7/27  -PT/OT/SLP evaluations to begin  -fall and aspiration precautions    # Hypertension.   c/w amlodipine  c/w metoprolol   -monitor BPs.  --SBP goal 120-150    #hyponatremia  -Encourage PO fluids  -Will continue to monitor    # 2019 novel coronavirus disease (COVID-19).   - on Eliquis for hypercoagulable state  - supplemental O2 as needed.    #Dementia  -continue donepezil, memantine, and seroquel-- pt's home medication.    -neuropsych eval, strict fall precautions, reorient    #Depression--  --Cont. Seroquel-- pt. was on this medication at home for depression.   --Cont. mirtazepine.  --monitor mood-- Neuropsych consult if needed.     #Dysphagia/dysarthria  - downgraded to minced and moist diet per SLP  - Vital Stim during SLP  - May need repeat MBSS    #Sleep/Insomnia  -cont. Rozerem  --Trial changing timing of donepezil to daily  --PRN ativan    Pain Mgmt - Tylenol PRN  GI/Bowel Mgmt -  Senna,  Miralax PRN  /Bladder Mgmt - Voiding independently, Check PVR     Precautions / PROPHYLAXIS:   - Falls, Cardiac, Seizure   - Lungs: Aspiration,  -- DVT: on Eliquis    IDT 8/2--  SW: lives in  with spouse and children, 1 ASHANTI,   OT: Supervision-- e/g/dressing; Min A LBD, toileting and functional transfers. Goal: supervision  PT: Supervision--Transfers, ambulation 100ft, and CG--12 steps 2 HR. Goal: supervision  Sp: Vital stim, Cognition fluctuates, Severe memory, poor carryover, hypophonia, dysarthria  ELOS: 8/6 Home

## 2022-08-03 LAB — SARS-COV-2 RNA SPEC QL NAA+PROBE: SIGNIFICANT CHANGE UP

## 2022-08-03 PROCEDURE — 99232 SBSQ HOSP IP/OBS MODERATE 35: CPT

## 2022-08-03 RX ORDER — LIDOCAINE 4 G/100G
1 CREAM TOPICAL EVERY 24 HOURS
Refills: 0 | Status: DISCONTINUED | OUTPATIENT
Start: 2022-08-03 | End: 2022-08-04

## 2022-08-03 RX ADMIN — LIDOCAINE 1 PATCH: 4 CREAM TOPICAL at 07:28

## 2022-08-03 RX ADMIN — Medication 50 MILLIGRAM(S): at 06:33

## 2022-08-03 RX ADMIN — MEMANTINE HYDROCHLORIDE 10 MILLIGRAM(S): 10 TABLET ORAL at 17:15

## 2022-08-03 RX ADMIN — Medication 40 MILLIGRAM(S): at 11:50

## 2022-08-03 RX ADMIN — QUETIAPINE FUMARATE 25 MILLIGRAM(S): 200 TABLET, FILM COATED ORAL at 18:13

## 2022-08-03 RX ADMIN — Medication 0.25 MILLIGRAM(S): at 22:20

## 2022-08-03 RX ADMIN — FAMOTIDINE 20 MILLIGRAM(S): 10 INJECTION INTRAVENOUS at 11:50

## 2022-08-03 RX ADMIN — APIXABAN 5 MILLIGRAM(S): 2.5 TABLET, FILM COATED ORAL at 06:33

## 2022-08-03 RX ADMIN — MEMANTINE HYDROCHLORIDE 10 MILLIGRAM(S): 10 TABLET ORAL at 06:36

## 2022-08-03 RX ADMIN — Medication 650 MILLIGRAM(S): at 03:47

## 2022-08-03 RX ADMIN — Medication 5 MILLIGRAM(S): at 11:50

## 2022-08-03 RX ADMIN — LIDOCAINE 1 PATCH: 4 CREAM TOPICAL at 16:06

## 2022-08-03 RX ADMIN — APIXABAN 5 MILLIGRAM(S): 2.5 TABLET, FILM COATED ORAL at 17:15

## 2022-08-03 RX ADMIN — LOSARTAN POTASSIUM 12.5 MILLIGRAM(S): 100 TABLET, FILM COATED ORAL at 06:33

## 2022-08-03 RX ADMIN — AMLODIPINE BESYLATE 10 MILLIGRAM(S): 2.5 TABLET ORAL at 06:33

## 2022-08-03 RX ADMIN — MIRTAZAPINE 7.5 MILLIGRAM(S): 45 TABLET, ORALLY DISINTEGRATING ORAL at 21:40

## 2022-08-03 RX ADMIN — LIDOCAINE 1 PATCH: 4 CREAM TOPICAL at 04:26

## 2022-08-03 RX ADMIN — DONEPEZIL HYDROCHLORIDE 10 MILLIGRAM(S): 10 TABLET, FILM COATED ORAL at 11:50

## 2022-08-03 RX ADMIN — Medication 100 MICROGRAM(S): at 06:33

## 2022-08-03 RX ADMIN — LIDOCAINE 1 PATCH: 4 CREAM TOPICAL at 16:05

## 2022-08-03 RX ADMIN — POLYETHYLENE GLYCOL 3350 17 GRAM(S): 17 POWDER, FOR SOLUTION ORAL at 11:50

## 2022-08-03 RX ADMIN — Medication 1 APPLICATION(S): at 17:15

## 2022-08-03 RX ADMIN — PHENYLEPHRINE-SHARK LIVER OIL-MINERAL OIL-PETROLATUM RECTAL OINTMENT 1 APPLICATION(S): at 17:15

## 2022-08-03 RX ADMIN — Medication 650 MILLIGRAM(S): at 04:25

## 2022-08-03 NOTE — PROGRESS NOTE ADULT - SUBJECTIVE AND OBJECTIVE BOX
Patient is a 82y old  Male who presents with a chief complaint of s/p right MCA CVA       Subjective and overnight events:  Patient seen and examined at bedside. no complaints. no fever, chills, sob, cp, abd pain.    ALLERGIES:  No Known Allergies    MEDICATIONS  (STANDING):  amLODIPine   Tablet 10 milliGRAM(s) Oral daily  apixaban 5 milliGRAM(s) Oral two times a day  AQUAPHOR (petrolatum Ointment) 1 Application(s) Topical daily  atorvastatin 80 milliGRAM(s) Oral at bedtime  bisacodyl 5 milliGRAM(s) Oral daily  donepezil 10 milliGRAM(s) Oral at bedtime  famotidine    Tablet 20 milliGRAM(s) Oral daily  hemorrhoidal Ointment 1 Application(s) Rectal daily  levothyroxine 100 MICROGram(s) Oral daily  lidocaine   4% Patch 1 Patch Transdermal daily  memantine 10 milliGRAM(s) Oral two times a day  metoprolol succinate ER 50 milliGRAM(s) Oral daily  mirtazapine 7.5 milliGRAM(s) Oral at bedtime  polyethylene glycol 3350 17 Gram(s) Oral daily  QUEtiapine 25 milliGRAM(s) Oral <User Schedule>  ramelteon 8 milliGRAM(s) Oral at bedtime    MEDICATIONS  (PRN):  acetaminophen     Tablet .. 650 milliGRAM(s) Oral every 6 hours PRN Temp greater or equal to 38C (100.4F), Mild Pain (1 - 3), Moderate Pain (4 - 6), Severe Pain (7 - 10)  aluminum hydroxide/magnesium hydroxide/simethicone Suspension 30 milliLiter(s) Oral every 4 hours PRN Dyspepsia  LORazepam     Tablet 0.5 milliGRAM(s) Oral daily PRN Agitation    Vital Signs Last 24 Hrs  T(C): 37 (03 Aug 2022 07:34), Max: 37 (03 Aug 2022 07:34)  T(F): 98.6 (03 Aug 2022 07:34), Max: 98.6 (03 Aug 2022 07:34)  HR: 61 (03 Aug 2022 07:34) (57 - 64)  BP: 155/68 (03 Aug 2022 07:34) (146/63 - 164/72)  RR: 16 (03 Aug 2022 07:34) (14 - 16)  SpO2: 99% (03 Aug 2022 07:34) (98% - 99%)    Parameters below as of 03 Aug 2022 07:34  Patient On (Oxygen Delivery Method): room air        PHYSICAL EXAM:  General: NAD, A/O x 3  ENT: MMM  Neck: Supple, No JVD  Lungs: Clear to auscultation bilaterally  Cardio: RRR, S1/S2, No murmurs  Abdomen: Soft, Nontender, Nondistended; Bowel sounds present  Extremities: Left great toe swollen, erythema, tenderness noted, no calf tenderness, No pitting edema    LABS:                        10.5   5.50  )-----------( 217      ( 01 Aug 2022 06:14 )             31.8     08-01    133  |  101  |  22  ----------------------------<  87  4.3   |  23  |  0.98    Ca    8.4      01 Aug 2022 06:14    TPro  6.8  /  Alb  2.7  /  TBili  0.5  /  DBili  x   /  AST  48  /  ALT  84  /  AlkPhos  88  08-01      07-16 Chol 189 mg/dL LDL -- HDL 61 mg/dL Trig 102 mg/dL    Culture - Blood (collected 25 Jul 2022 19:01)  Source: .Blood Blood-Venous  Final Report (30 Jul 2022 23:01):    No Growth Final    Culture - Blood (collected 25 Jul 2022 19:01)  Source: .Blood Blood-Peripheral  Final Report (30 Jul 2022 22:01):    No Growth Final      COVID-19 PCR: NotDetec (07-27-22 @ 17:20)  COVID-19 PCR: NotDetec (07-26-22 @ 06:07)  COVID-19 PCR: Detected (07-19-22 @ 17:00)  COVID-19 PCR: Detected (07-16-22 @ 00:50)  COVID-19 PCR: Detected (07-16-22 @ 00:41)      Care Discussed with Consultants/Other Providers: Rehab

## 2022-08-03 NOTE — PROVIDER CONTACT NOTE (OTHER) - ASSESSMENT
pt. sitting in chair visibly in discomfort rubbing feet and restless. + pulses in bilateral feet, feet warm to touch. PRN Tylenol given an hour early.
pt. rubbing feet.

## 2022-08-03 NOTE — PROVIDER CONTACT NOTE (OTHER) - SITUATION
pt. complaining of 10/10 sharp bilateral foot pain, pt. received PRN dose of Tylenol.  pt. also complaining he is unable to sleep.
pt. complaining of 9 out of 10 pain in bilateral lower extremities. shooting pain in leg going down to foot more so on the left side then right side.

## 2022-08-03 NOTE — PROGRESS NOTE ADULT - SUBJECTIVE AND OBJECTIVE BOX
HPI:  83 yo Male with PMH of thyroid ca s/p thyroidectomy 2018, HTN, TIA (15-20 years ago), alzheimer's disease, and depression presented to [facility] ED on 7/15 with acute Left facial droop/ L sided weakness and slurring of speech. CTH 7/15 neg, MR shows right MCA infarct. S/p Tpa. No LVO/no thrombectomy. EP consulted for ILR. Eliquis started for Covid+ r/o hypercoagulable state. DDimer >2000. ID following, no indication for antivirals, asymptomatic. Transition to ASA on 8/15 (1 month post covid AC). HgA1C 6.1 %, . CXR 7/18 No focal infiltrates. No pleural effusion or pneumothorax. TTE EF70%, unremarkable, cardiac monitoring: no events. Passed dysphagia screen, aspiration precautions, s/p MBS 7/18, upgraded regular solids with thin liquids. Hyponatremia to monitor closely. Will need anticoagulation for 1 month due to elevated D-Dimer. LE doppler 7/19 and on 7/24 negative for DVT. Depression, Dementia with sundowning at baseline- home regimen Seroquel, Mirtazepine, Donepezil, Namenda. Agitated on 7/19, improved, no longer on restraints. Impression: L hemiparesis and dysarthria due to acute ischemic stroke R MCA . Mechanism is unclear, Embolic secondary to ESUS vs. hypercoagulable state secondary to COVID 19+. Eliquis given elevated D-Dimer (2086) hypercoagulable state secondary to Covid infection, switch to ASA on 08/15 for stroke prevention. TTE shows EF70%, unremarkable, ILR placed to rule out A. Fib as possible source. Evaluated by PMR and acute rehab recommended. Cleared for dc on 7/27. Evaluated by PT/OT and was recommended AR. Patient stable for discharge.           Subjective:  Pt. was seen by SONNY last night due to c/o shooting pain going to left foot.  Was given tylenol  and lidocaine patch.  Pt. slept on and off during the night as per nursing.  Pt. seen this AM and c/o pain in left foot.  left great toe MTP with erythema and tenderness-- Pt. reports a h/o gout      VITALS  Vital Signs Last 24 Hrs  T(C): 37 (03 Aug 2022 07:34), Max: 37 (03 Aug 2022 07:34)  T(F): 98.6 (03 Aug 2022 07:34), Max: 98.6 (03 Aug 2022 07:34)  HR: 61 (03 Aug 2022 07:34) (57 - 64)  BP: 155/68 (03 Aug 2022 07:34) (146/63 - 164/72)  BP(mean): --  RR: 16 (03 Aug 2022 07:34) (14 - 16)  SpO2: 99% (03 Aug 2022 07:34) (98% - 99%)    Parameters below as of 03 Aug 2022 07:34  Patient On (Oxygen Delivery Method): room air        REVIEW OF SYMPTOMS  Neurological deficits- Cognitive deficits.   --Left foot/toe pain  - Denies fevers, chills, chest pain, shortness of breath, abdominal pain, headaches, nausea/vomiting        Physical Exam:  Gen - NAD, Comfortable  HEENT - NCAT, EOMI, MMM  Neck - Supple,   Pulm - breathing comfortably on RA  Cardiovascular - RRR, S1S2,  Abdomen - Soft, NT/ND, +BS  Extremities - No edema, No calf tenderness.  +calor, tenderness of left 1st MTP joint.   Neuro-     Cognitive - AAOx2      Communication - Fluent, Mild dysarthria     Attention: Impaired/Distracted     Judgement: impaired   Psychiatric - Mood stable, Affect Flat      RECENT LABS                  RADIOLOGY/OTHER RESULTS      MEDICATIONS  (STANDING):  amLODIPine   Tablet 10 milliGRAM(s) Oral daily  apixaban 5 milliGRAM(s) Oral two times a day  AQUAPHOR (petrolatum Ointment) 1 Application(s) Topical daily  atorvastatin 80 milliGRAM(s) Oral at bedtime  bisacodyl 5 milliGRAM(s) Oral daily  donepezil 10 milliGRAM(s) Oral daily  famotidine    Tablet 20 milliGRAM(s) Oral daily  hemorrhoidal Ointment 1 Application(s) Rectal daily  levothyroxine 100 MICROGram(s) Oral daily  losartan 12.5 milliGRAM(s) Oral daily  memantine 10 milliGRAM(s) Oral two times a day  metoprolol succinate ER 50 milliGRAM(s) Oral daily  mirtazapine 7.5 milliGRAM(s) Oral at bedtime  polyethylene glycol 3350 17 Gram(s) Oral daily  predniSONE   Tablet 40 milliGRAM(s) Oral once  QUEtiapine 25 milliGRAM(s) Oral <User Schedule>  ramelteon 8 milliGRAM(s) Oral at bedtime    MEDICATIONS  (PRN):  acetaminophen     Tablet .. 650 milliGRAM(s) Oral every 6 hours PRN Temp greater or equal to 38C (100.4F), Mild Pain (1 - 3), Moderate Pain (4 - 6), Severe Pain (7 - 10)  aluminum hydroxide/magnesium hydroxide/simethicone Suspension 30 milliLiter(s) Oral every 4 hours PRN Dyspepsia  lidocaine   4% Patch 1 Patch Transdermal every 24 hours PRN Pain  lidocaine   4% Patch 1 Patch Transdermal every 24 hours PRN Pain  LORazepam     Tablet 0.5 milliGRAM(s) Oral daily PRN Agitation  sodium chloride 0.65% Nasal 1 Spray(s) Both Nostrils five times a day PRN Nasal Congestion

## 2022-08-03 NOTE — PROGRESS NOTE ADULT - ASSESSMENT
MIGUEL ORDONEZ is a 83 yo Male with PMH of thyroid ca s/p thyroidectomy 2018, HTN, TIA (15-20 years ago), alzheimer's disease, and depression presented to [facility] ED on 7/15 with acute Left facial droop/ L sided weakness and slurring of speech, found to have Right MCA CVA s/p tPA. H/C complicated by COVID infection and sundowning.  Admitted to Acute rehabilitation with cognitive deficits, dysarthria, gait and ADL impairments.     #Possible acute gout left great toe  -Will do prednisone taper as written    #CVA of R MCA  -Embolic secondary to ESUS vs. hypercoagulable state secondary to COVID 19+   s/p tPA   Eliquis/Statin to continue until 8/15, then statin+ Aspirin 8/15.   TTE - EF 70%, minimal AR   -ILR 7/27  -PT/OT/SLP evaluations to begin  -fall and aspiration precautions    # Hypertension.   -BP labile, intermittent elevated SBP up to 170  change diet to salt restricted diet  add losartan 12.5mg  c/w amlodipine 10mg daily  c/w metoprolol ER 50mg daily  monitor BPs    # 2019 novel coronavirus disease (COVID-19).   - on Eliquis 5mg BID for hypercoagulable state  - supplemental O2 as needed.    #dementia  -continue regimen, evaluated by psych prior rehab for periods of agitation  -neuropsych eval, strict fall precautions, reorient    #hypoantremia  -stable  -monitor    DVT: on Eliquis

## 2022-08-03 NOTE — PROGRESS NOTE ADULT - ASSESSMENT
MIGUEL ORDONEZ is a 83 yo Male with PMH of thyroid ca s/p thyroidectomy 2018, HTN, TIA (15-20 years ago), alzheimer's disease, and depression presented to [facility] ED on 7/15 with acute Left facial droop/ L sided weakness and slurring of speech, found to have Right MCA CVA s/p tPA. H/C complicated by COVID infection and sundowning.  Admitted to Acute rehabilitation with cognitive deficits, dysarthria, gait and ADL impairments.     COMORBIDITES/ACTIVE MEDICAL ISSUES     Gait Instability, ADL impairments and Functional impairments: cont Comprehensive Rehab Program of PT/OT & speech therapy    #CVA of R MCA  -Embolic secondary to ESUS vs. hypercoagulable state secondary to COVID 19+   s/p tPA   Eliquis/Statin to continue until 8/15, then statin+ Aspirin 8/15.   TTE - EF 70%, minimal AR    - ILR 7/27  -PT/OT/SLP evaluations to begin  -fall and aspiration precautions    gout flare  --d/w hospitalist-- Prednisone 5 day course.     # Hypertension.   c/w amlodipine  c/w metoprolol   -monitor BPs.  --SBP goal 120-150    #hyponatremia  --Stable  -Will continue to monitor    # 2019 novel coronavirus disease (COVID-19).   - on Eliquis for hypercoagulable state  - supplemental O2 as needed.    #Dementia  -continue donepezil, memantine, and seroquel-- pt's home medication.    -neuropsych eval, strict fall precautions, reorient    #Depression--  --Cont. Seroquel-- pt. was on this medication at home for depression.   --Cont. mirtazepine.  --- Neuropsych consult     #Dysphagia/dysarthria  - downgraded to minced and moist diet per SLP  - Vital Stim during SLP  - May need repeat MBSS    #Sleep/Insomnia  -cont. Rozerem  --Trial-PRN ativan 0.25mg qhs    Pain Mgmt - Tylenol PRN  GI/Bowel Mgmt -  Senna,  Miralax PRN  /Bladder Mgmt - Voiding independently, Check PVR     Precautions / PROPHYLAXIS:   - Falls, Cardiac, Seizure   - Lungs: Aspiration,  -- DVT: on Eliquis    IDT 8/2--  SW: lives in  with spouse and children, 1 ASHANTI,   OT: Supervision-- e/g/dressing; Min A LBD, toileting and functional transfers. Goal: supervision  PT: Supervision--Transfers, ambulation 100ft, and CG--12 steps 2 HR. Goal: supervision  Sp: Vital stim, Cognition fluctuates, Severe memory, poor carryover, hypophonia, dysarthria  ELOS: 8/6 Home    Called Pt's daughter, Dr. Jordyn Ordonez, to provide update, but did not answer.  Left voicemail.

## 2022-08-04 LAB
ALBUMIN SERPL ELPH-MCNC: 2.7 G/DL — LOW (ref 3.3–5)
ALP SERPL-CCNC: 86 U/L — SIGNIFICANT CHANGE UP (ref 40–120)
ALT FLD-CCNC: 54 U/L — HIGH (ref 10–45)
ANION GAP SERPL CALC-SCNC: 12 MMOL/L — SIGNIFICANT CHANGE UP (ref 5–17)
AST SERPL-CCNC: 32 U/L — SIGNIFICANT CHANGE UP (ref 10–40)
BASOPHILS # BLD AUTO: 0.02 K/UL — SIGNIFICANT CHANGE UP (ref 0–0.2)
BASOPHILS NFR BLD AUTO: 0.2 % — SIGNIFICANT CHANGE UP (ref 0–2)
BILIRUB SERPL-MCNC: 0.5 MG/DL — SIGNIFICANT CHANGE UP (ref 0.2–1.2)
BUN SERPL-MCNC: 28 MG/DL — HIGH (ref 7–23)
CALCIUM SERPL-MCNC: 8.7 MG/DL — SIGNIFICANT CHANGE UP (ref 8.4–10.5)
CHLORIDE SERPL-SCNC: 98 MMOL/L — SIGNIFICANT CHANGE UP (ref 96–108)
CO2 SERPL-SCNC: 19 MMOL/L — LOW (ref 22–31)
CREAT SERPL-MCNC: 1.02 MG/DL — SIGNIFICANT CHANGE UP (ref 0.5–1.3)
EGFR: 72 ML/MIN/1.73M2 — SIGNIFICANT CHANGE UP
EOSINOPHIL # BLD AUTO: 0.03 K/UL — SIGNIFICANT CHANGE UP (ref 0–0.5)
EOSINOPHIL NFR BLD AUTO: 0.3 % — SIGNIFICANT CHANGE UP (ref 0–6)
GLUCOSE SERPL-MCNC: 100 MG/DL — HIGH (ref 70–99)
HCT VFR BLD CALC: 31.2 % — LOW (ref 39–50)
HGB BLD-MCNC: 10.5 G/DL — LOW (ref 13–17)
IMM GRANULOCYTES NFR BLD AUTO: 0.2 % — SIGNIFICANT CHANGE UP (ref 0–1.5)
LYMPHOCYTES # BLD AUTO: 1.32 K/UL — SIGNIFICANT CHANGE UP (ref 1–3.3)
LYMPHOCYTES # BLD AUTO: 15.1 % — SIGNIFICANT CHANGE UP (ref 13–44)
MCHC RBC-ENTMCNC: 27.4 PG — SIGNIFICANT CHANGE UP (ref 27–34)
MCHC RBC-ENTMCNC: 33.7 GM/DL — SIGNIFICANT CHANGE UP (ref 32–36)
MCV RBC AUTO: 81.5 FL — SIGNIFICANT CHANGE UP (ref 80–100)
MONOCYTES # BLD AUTO: 0.56 K/UL — SIGNIFICANT CHANGE UP (ref 0–0.9)
MONOCYTES NFR BLD AUTO: 6.4 % — SIGNIFICANT CHANGE UP (ref 2–14)
NEUTROPHILS # BLD AUTO: 6.82 K/UL — SIGNIFICANT CHANGE UP (ref 1.8–7.4)
NEUTROPHILS NFR BLD AUTO: 77.8 % — HIGH (ref 43–77)
NRBC # BLD: 0 /100 WBCS — SIGNIFICANT CHANGE UP (ref 0–0)
PLATELET # BLD AUTO: 231 K/UL — SIGNIFICANT CHANGE UP (ref 150–400)
POTASSIUM SERPL-MCNC: 4 MMOL/L — SIGNIFICANT CHANGE UP (ref 3.5–5.3)
POTASSIUM SERPL-SCNC: 4 MMOL/L — SIGNIFICANT CHANGE UP (ref 3.5–5.3)
PROT SERPL-MCNC: 7 G/DL — SIGNIFICANT CHANGE UP (ref 6–8.3)
RBC # BLD: 3.83 M/UL — LOW (ref 4.2–5.8)
RBC # FLD: 12.4 % — SIGNIFICANT CHANGE UP (ref 10.3–14.5)
SODIUM SERPL-SCNC: 129 MMOL/L — LOW (ref 135–145)
WBC # BLD: 8.77 K/UL — SIGNIFICANT CHANGE UP (ref 3.8–10.5)
WBC # FLD AUTO: 8.77 K/UL — SIGNIFICANT CHANGE UP (ref 3.8–10.5)

## 2022-08-04 PROCEDURE — 99232 SBSQ HOSP IP/OBS MODERATE 35: CPT

## 2022-08-04 PROCEDURE — 96116 NUBHVL XM PHYS/QHP 1ST HR: CPT

## 2022-08-04 RX ADMIN — Medication 100 MICROGRAM(S): at 05:56

## 2022-08-04 RX ADMIN — AMLODIPINE BESYLATE 10 MILLIGRAM(S): 2.5 TABLET ORAL at 05:56

## 2022-08-04 RX ADMIN — MIRTAZAPINE 7.5 MILLIGRAM(S): 45 TABLET, ORALLY DISINTEGRATING ORAL at 20:13

## 2022-08-04 RX ADMIN — Medication 30 MILLILITER(S): at 11:50

## 2022-08-04 RX ADMIN — Medication 5 MILLIGRAM(S): at 11:42

## 2022-08-04 RX ADMIN — QUETIAPINE FUMARATE 25 MILLIGRAM(S): 200 TABLET, FILM COATED ORAL at 18:55

## 2022-08-04 RX ADMIN — LOSARTAN POTASSIUM 12.5 MILLIGRAM(S): 100 TABLET, FILM COATED ORAL at 05:56

## 2022-08-04 RX ADMIN — APIXABAN 5 MILLIGRAM(S): 2.5 TABLET, FILM COATED ORAL at 05:55

## 2022-08-04 RX ADMIN — PHENYLEPHRINE-SHARK LIVER OIL-MINERAL OIL-PETROLATUM RECTAL OINTMENT 1 APPLICATION(S): at 11:46

## 2022-08-04 RX ADMIN — Medication 30 MILLIGRAM(S): at 05:56

## 2022-08-04 RX ADMIN — ATORVASTATIN CALCIUM 80 MILLIGRAM(S): 80 TABLET, FILM COATED ORAL at 20:13

## 2022-08-04 RX ADMIN — POLYETHYLENE GLYCOL 3350 17 GRAM(S): 17 POWDER, FOR SOLUTION ORAL at 11:46

## 2022-08-04 RX ADMIN — Medication 50 MILLIGRAM(S): at 05:56

## 2022-08-04 RX ADMIN — DONEPEZIL HYDROCHLORIDE 10 MILLIGRAM(S): 10 TABLET, FILM COATED ORAL at 11:42

## 2022-08-04 RX ADMIN — Medication 1 APPLICATION(S): at 11:46

## 2022-08-04 RX ADMIN — MEMANTINE HYDROCHLORIDE 10 MILLIGRAM(S): 10 TABLET ORAL at 17:17

## 2022-08-04 RX ADMIN — MEMANTINE HYDROCHLORIDE 10 MILLIGRAM(S): 10 TABLET ORAL at 05:55

## 2022-08-04 RX ADMIN — Medication 8 MILLIGRAM(S): at 20:13

## 2022-08-04 RX ADMIN — FAMOTIDINE 20 MILLIGRAM(S): 10 INJECTION INTRAVENOUS at 11:42

## 2022-08-04 RX ADMIN — APIXABAN 5 MILLIGRAM(S): 2.5 TABLET, FILM COATED ORAL at 17:17

## 2022-08-04 NOTE — CONSULT NOTE ADULT - SUBJECTIVE AND OBJECTIVE BOX
Pt is an 81 y/o right-handed male with PMHx of thyroid ca s/p thyroidectomy 2018, HTN, TIA (15-20 years ago), alzheimer's disease, and depression presented to [facility] ED on 7/15 with acute Left facial droop/ L sided weakness and slurring of speech. CTH 7/15 neg, MR shows right MCA infarct. S/p Tpa. No LVO/no thrombectomy. EP consulted for ILR. Eliquis started for Covid+ r/o hypercoagulable state. DDimer >2000. ID following, no indication for antivirals, asymptomatic. Transition to ASA on 8/15 (1 month post covid AC). HgA1C 6.1 %, . CXR 7/18 No focal infiltrates. No pleural effusion or pneumothorax. TTE EF70%, unremarkable, cardiac monitoring: no events. Passed dysphagia screen, aspiration precautions, s/p MBS 7/18, upgraded regular solids with thin liquids. Hyponatremia to monitor closely. Will need anticoagulation for 1 month due to elevated D-Dimer. LE doppler 7/19 and on 7/24 negative for DVT. Depression, Dementia with sundowning at baseline- home regimen Seroquel, Mirtazepine, Donepezil, Namenda. Agitated on 7/19, improved, no longer on restraints. Impression: L hemiparesis and dysarthria due to acute ischemic stroke R MCA . Mechanism is unclear, Embolic secondary to ESUS vs. hypercoagulable state secondary to COVID 19+. Eliquis given elevated D-Dimer (2086) hypercoagulable state secondary to Covid infection, switch to ASA on 08/15 for stroke prevention. TTE shows EF70%, unremarkable, ILR placed to rule out A. Fib as possible source. Evaluated by PMR and acute rehab recommended. Cleared for dc on 7/27. Evaluated by PT/OT and was recommended AR. Patient stable for discharge. PMHx:   . Current meds: Please see list in Pt’s chart. Social Hx:   Findings: Pt was seen for an initial assessment of his/her cognitive and emotional functioning. The Modified MMSE (3MS) was administered; Pt’s results (/100) were in the range. His/Her scores in geriatric mood measures suggested levels of anxiety and depression (GAD7 = /21; PHQ9 = /27; GAS = /30; GDS =  /15). Pt was alert,  Ox3, and cooperative.  Attn/Conc: Simple auditory attention - .  Concentration/Working memory for reversed counting and spelling – (/7). Language: Pt’s speech was of  . Naming - . Sentence repetition - . Auditory Comprehension - . Reading - . Writing - . Memory: Encoding of 3 words was (/3); short-delayed verbal recall – (/3, improving to /3 with cueing); long-delayed verbal recall – (/3, improving to /3 with cueing). LTM was for US presidents (/4, improving to /4 with cueing). Visual memory –. Visuospatial: Visuomotor integration – for copy of a 2D figure, was noted. Executive Functions: Motor Planning - . Organizational skills - . Abstract reasoning - . Initiation/Phonemic Fluency - .Verbal problem solving – .   Emotional functioning: Affect - 	. Mood - ; Pt reported experiencing . On mood measures s/he additionally reported .  Thought processes were.  No abnormal thought contents were observed.  Pt denied any AH/VH. Pt also denied SI/HI/I/P. Insight - WFL. Judgment - fair.     Pt is an 83 y/o right-handed male with PMHx of thyroid ca s/p thyroidectomy 2018, HTN, TIA (15-20 years ago), Alzheimer's disease, and depression presented to [facility] ED on 7/15 with acute Left facial droop/ L sided weakness and slurring of speech. CTH 7/15 neg, MR shows right MCA infarct. S/p Tpa. No LVO/no thrombectomy. EP consulted for ILR. Eliquis started for Covid+ r/o hypercoagulable state. DDimer >2000. ID following, no indication for antivirals, asymptomatic. Transition to ASA on 8/15 (1 month post covid AC). HgA1C 6.1 %, . CXR 7/18 No focal infiltrates. No pleural effusion or pneumothorax. TTE EF70%, unremarkable, cardiac monitoring: no events. Passed dysphagia screen, aspiration precautions, s/p MBS 7/18, upgraded regular solids with thin liquids. Hyponatremia to monitor closely. Will need anticoagulation for 1 month due to elevated D-Dimer. LE doppler 7/19 and on 7/24 negative for DVT. Depression, Dementia with sundowning at baseline- home regimen Seroquel, Mirtazepine, Donepezil, Namenda. Agitated on 7/19, improved, no longer on restraints. Impression: L hemiparesis and dysarthria due to acute ischemic stroke R MCA . Mechanism is unclear, Embolic secondary to ESUS vs. hypercoagulable state secondary to COVID 19+. Eliquis given elevated D-Dimer (2086) hypercoagulable state secondary to Covid infection, switch to ASA on 08/15 for stroke prevention. TTE shows EF70%, unremarkable, ILR placed to rule out A. Fib as possible source. Evaluated by PMR and acute rehab recommended. Cleared for dc on 7/27. Evaluated by PT/OT and was recommended AR. Pt stable for discharge. PMHx: Ativan 0.25 mg HS PRN insomnia, Ativan Injectable 0.25 mg IM PRN for agitiation, Aricept 10 mg QD, Seroquel 25 mg q 19:00, Rozerem 8 mg HS, Namenda 10 mg QD, Remeron 75 mg HS. Current meds: Please see list in Pt’s chart. Social Hx: Pt is  and has two adult daughters. 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 Pt is followed by an outpatient psychiatrist and is taking diiferent medication for cognition and depression. Pt follows the [facility]. He enjoys watching TV and doing crossword puzzles.    Findings: Pt was seen for an initial assessment of his/her cognitive and emotional functioning. The Modified MMSE (3MS) was administered; Pt’s results (60/100) were in the Severe Impairment range. His scores in geriatric mood measures suggested severe levels of anxiety and depression (GAS = 18/30; GDS = 13/15). Pt was alert, partly Ox3 (Pt was disoriented to the season, month, day of the week/month, and city), and cooperative. Attn/Conc: Simple auditory attention - intact.  Concentration/Working memory for reversed counting and spelling – moderately impaired (2/7). Language: Pt’s speech was hypophonic, with hoarse pitch and slow pace; verbal output was somewhat limited. Naming - intact. Sentence repetition - intact. Auditory Comprehension - mildly impaired (2/3). Reading - intact. Writing - intact. Memory: Encoding of 3 words was intact (3/3); short-delayed verbal recall – severely impaired (0/3, improving to 1/3 with saturated cueing); long-delayed verbal recall – severely impaired (0/3, without recovery noted following saturated cueing). LTM was moderately impaired for US presidents (2/4, improving to 4/4 with cueing). Visual memory – mildly impaired. Visuospatial: Visuomotor integration – mildly impaired for copy of a 2D figure (9/10), mild distortion and sloppiness were noted. Executive Functions: Motor Planning - intact. Organizational skills - mildly impaired. Abstract reasoning - mildly impaired. Initiation/Phonemic Fluency - moderately impaired. Verbal problem solving –  moderately impaired. Emotional functioning: Affect - 	. Mood - ; Pt reported experiencing . On mood measures s/he additionally reported .  Thought processes were.  No abnormal thought contents were observed.  Pt denied any AH/VH. Pt also denied SI/HI/I/P. Insight - WFL. Judgment - fair.     Pt is an 83 y/o right-handed male with PMHx of thyroid ca s/p thyroidectomy 2018, HTN, TIA (15-20 years ago), Alzheimer's disease, and depression presented to [facility] ED on 7/15 with acute Left facial droop/ L sided weakness and slurring of speech. CTH 7/15 neg, MR shows right MCA infarct. S/p Tpa. No LVO/no thrombectomy. EP consulted for ILR. Eliquis started for Covid+ r/o hypercoagulable state. DDimer >2000. ID following, no indication for antivirals, asymptomatic. Transition to ASA on 8/15 (1 month post covid AC). HgA1C 6.1 %, . CXR 7/18 No focal infiltrates. No pleural effusion or pneumothorax. TTE EF70%, unremarkable, cardiac monitoring: no events. Passed dysphagia screen, aspiration precautions, s/p MBS 7/18, upgraded regular solids with thin liquids. Hyponatremia to monitor closely. Will need anticoagulation for 1 month due to elevated D-Dimer. LE doppler 7/19 and on 7/24 negative for DVT. Depression, Dementia with sundowning at baseline- home regimen Seroquel, Mirtazepine, Donepezil, Namenda. Agitated on 7/19, improved, no longer on restraints. Impression: L hemiparesis and dysarthria due to acute ischemic stroke R MCA . Mechanism is unclear, Embolic secondary to ESUS vs. hypercoagulable state secondary to COVID 19+. Eliquis given elevated D-Dimer (2086) hypercoagulable state secondary to Covid infection, switch to ASA on 08/15 for stroke prevention. TTE shows EF70%, unremarkable, ILR placed to rule out A. Fib as possible source. Evaluated by PMR and acute rehab recommended. Cleared for dc on 7/27. Evaluated by PT/OT and was recommended AR. Pt stable for discharge. PMHx: Ativan 0.25 mg HS PRN insomnia, Ativan Injectable 0.25 mg IM PRN for agitiation, Aricept 10 mg QD, Seroquel 25 mg q 19:00, Rozerem 8 mg HS, Namenda 10 mg QD, Remeron 75 mg HS. Current meds: Please see list in Pt’s chart. Social Hx: Pt is  and has two adult daughters. 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 Pt is followed by an outpatient psychiatrist and is taking diiferent medication for cognition and depression. Pt follows the [facility]. He enjoys watching TV and doing crossword puzzles.    Findings: Pt was seen for an initial assessment of his/her cognitive and emotional functioning. The Modified MMSE (3MS) was administered; Pt’s results (60/100) were in the Severe Impairment range. His scores in geriatric mood measures suggested severe levels of anxiety and depression (GAS = 18/30; GDS = 13/15). Pt was alert, partly Ox3 (Pt was disoriented to the season, month, day of the week/month, and city), and cooperative. Attn/Conc: Simple auditory attention - intact.  Concentration/Working memory for reversed counting and spelling – moderately impaired (2/7). Language: Pt’s speech was hypophonic, with hoarse pitch and slow pace; verbal output was somewhat limited. Naming - intact. Sentence repetition - intact. Auditory Comprehension - mildly impaired (2/3). Reading - intact. Writing - intact. Memory: Encoding of 3 words was intact (3/3); short-delayed verbal recall – severely impaired (0/3, improving to 1/3 with saturated cueing); long-delayed verbal recall – severely impaired (0/3, without recovery noted following saturated cueing). LTM was moderately impaired for US presidents (2/4, improving to 4/4 with cueing). Visual memory – mildly impaired. Visuospatial: Visuomotor integration – mildly impaired for copy of a 2D figure (9/10), mild distortion and sloppiness were noted. Executive Functions: Motor Planning - intact. Organizational skills - mildly impaired. Abstract reasoning - mildly impaired. Initiation/Phonemic Fluency - moderately impaired. Verbal problem solving –  moderately impaired. Emotional functioning: Affect - depressed. Mood - dysphoric ("so, so"); Pt reported experiencing nervousness, worry. On mood measures he additionally reported irritability, feelings of isolation, feelings of unreality, worry, restlessness, anxiety, fatigue, tension, perceived lack of control, catastrophization, anhedonia, dysphoric mood, emptiness, boredom, helplessness, memory loss, low self-esteem, and low energy.  Thought processes were goal-directed. No abnormal thought contents were observed.  Pt denied any AH/VH. Pt also denied SI/HI/I/P. Insight - poor. Judgment - fair.

## 2022-08-04 NOTE — PROGRESS NOTE ADULT - SUBJECTIVE AND OBJECTIVE BOX
HPI:  81 yo Male with PMH of thyroid ca s/p thyroidectomy 2018, HTN, TIA (15-20 years ago), alzheimer's disease, and depression presented to [facility] ED on 7/15 with acute Left facial droop/ L sided weakness and slurring of speech. CTH 7/15 neg, MR shows right MCA infarct. S/p Tpa. No LVO/no thrombectomy. EP consulted for ILR. Eliquis started for Covid+ r/o hypercoagulable state. DDimer >2000. ID following, no indication for antivirals, asymptomatic. Transition to ASA on 8/15 (1 month post covid AC). HgA1C 6.1 %, . CXR 7/18 No focal infiltrates. No pleural effusion or pneumothorax. TTE EF70%, unremarkable, cardiac monitoring: no events. Passed dysphagia screen, aspiration precautions, s/p MBS 7/18, upgraded regular solids with thin liquids. Hyponatremia to monitor closely. Will need anticoagulation for 1 month due to elevated D-Dimer. LE doppler 7/19 and on 7/24 negative for DVT. Depression, Dementia with sundowning at baseline- home regimen Seroquel, Mirtazepine, Donepezil, Namenda. Agitated on 7/19, improved, no longer on restraints. Impression: L hemiparesis and dysarthria due to acute ischemic stroke R MCA . Mechanism is unclear, Embolic secondary to ESUS vs. hypercoagulable state secondary to COVID 19+. Eliquis given elevated D-Dimer (2086) hypercoagulable state secondary to Covid infection, switch to ASA on 08/15 for stroke prevention. TTE shows EF70%, unremarkable, ILR placed to rule out A. Fib as possible source. Evaluated by PMR and acute rehab recommended. Cleared for dc on 7/27. Evaluated by PT/OT and was recommended AR. Patient stable for discharge.          (27 Jul 2022 15:20)          Subjective:  Left foot pain better, slept last night.        VITALS  Vital Signs Last 24 Hrs  T(C): 36.6 (04 Aug 2022 08:53), Max: 36.6 (04 Aug 2022 08:53)  T(F): 97.9 (04 Aug 2022 08:53), Max: 97.9 (04 Aug 2022 08:53)  HR: 60 (04 Aug 2022 08:53) (60 - 77)  BP: 159/61 (04 Aug 2022 08:53) (148/58 - 167/76)  BP(mean): --  RR: 16 (04 Aug 2022 08:53) (16 - 16)  SpO2: 98% (04 Aug 2022 08:53) (98% - 99%)    Parameters below as of 04 Aug 2022 08:53  Patient On (Oxygen Delivery Method): room air        REVIEW OF SYMPTOMS  Neurological deficits            RECENT LABS                        10.5   8.77  )-----------( 231      ( 04 Aug 2022 06:11 )             31.2     08-04    129<L>  |  98  |  28<H>  ----------------------------<  100<H>  4.0   |  19<L>  |  1.02    Ca    8.7      04 Aug 2022 06:11    TPro  7.0  /  Alb  2.7<L>  /  TBili  0.5  /  DBili  x   /  AST  32  /  ALT  54<H>  /  AlkPhos  86  08-04            RADIOLOGY/OTHER RESULTS      MEDICATIONS  (STANDING):  amLODIPine   Tablet 10 milliGRAM(s) Oral daily  apixaban 5 milliGRAM(s) Oral two times a day  AQUAPHOR (petrolatum Ointment) 1 Application(s) Topical daily  atorvastatin 80 milliGRAM(s) Oral at bedtime  bisacodyl 5 milliGRAM(s) Oral daily  donepezil 10 milliGRAM(s) Oral daily  famotidine    Tablet 20 milliGRAM(s) Oral daily  hemorrhoidal Ointment 1 Application(s) Rectal daily  levothyroxine 100 MICROGram(s) Oral daily  losartan 12.5 milliGRAM(s) Oral daily  memantine 10 milliGRAM(s) Oral two times a day  metoprolol succinate ER 50 milliGRAM(s) Oral daily  mirtazapine 7.5 milliGRAM(s) Oral at bedtime  polyethylene glycol 3350 17 Gram(s) Oral daily  QUEtiapine 25 milliGRAM(s) Oral <User Schedule>  ramelteon 8 milliGRAM(s) Oral at bedtime    MEDICATIONS  (PRN):  acetaminophen     Tablet .. 650 milliGRAM(s) Oral every 6 hours PRN Temp greater or equal to 38C (100.4F), Mild Pain (1 - 3), Moderate Pain (4 - 6), Severe Pain (7 - 10)  aluminum hydroxide/magnesium hydroxide/simethicone Suspension 30 milliLiter(s) Oral every 4 hours PRN Dyspepsia  lidocaine   4% Patch 1 Patch Transdermal every 24 hours PRN Pain  lidocaine   4% Patch 1 Patch Transdermal every 24 hours PRN Pain  LORazepam     Tablet 0.25 milliGRAM(s) Oral at bedtime PRN insomnia  LORazepam   Injectable 0.25 milliGRAM(s) IntraMuscular once PRN Agitation  sodium chloride 0.65% Nasal 1 Spray(s) Both Nostrils five times a day PRN Nasal Congestion         HPI:  83 yo Male with PMH of thyroid ca s/p thyroidectomy 2018, HTN, TIA (15-20 years ago), alzheimer's disease, and depression presented to [facility] ED on 7/15 with acute Left facial droop/ L sided weakness and slurring of speech. CTH 7/15 neg, MR shows right MCA infarct. S/p Tpa. No LVO/no thrombectomy. EP consulted for ILR. Eliquis started for Covid+ r/o hypercoagulable state. DDimer >2000. ID following, no indication for antivirals, asymptomatic. Transition to ASA on 8/15 (1 month post covid AC). HgA1C 6.1 %, . CXR 7/18 No focal infiltrates. No pleural effusion or pneumothorax. TTE EF70%, unremarkable, cardiac monitoring: no events. Passed dysphagia screen, aspiration precautions, s/p MBS 7/18, upgraded regular solids with thin liquids. Hyponatremia to monitor closely. Will need anticoagulation for 1 month due to elevated D-Dimer. LE doppler 7/19 and on 7/24 negative for DVT. Depression, Dementia with sundowning at baseline- home regimen Seroquel, Mirtazepine, Donepezil, Namenda. Agitated on 7/19, improved, no longer on restraints. Impression: L hemiparesis and dysarthria due to acute ischemic stroke R MCA . Mechanism is unclear, Embolic secondary to ESUS vs. hypercoagulable state secondary to COVID 19+. Eliquis given elevated D-Dimer (2086) hypercoagulable state secondary to Covid infection, switch to ASA on 08/15 for stroke prevention. TTE shows EF70%, unremarkable, ILR placed to rule out A. Fib as possible source. Evaluated by PMR and acute rehab recommended. Cleared for dc on 7/27. Evaluated by PT/OT and was recommended AR. Patient stable for discharge.          (27 Jul 2022 15:20)          Subjective:  Left foot pain better, slept last night.        VITALS  Vital Signs Last 24 Hrs  T(C): 36.6 (04 Aug 2022 08:53), Max: 36.6 (04 Aug 2022 08:53)  T(F): 97.9 (04 Aug 2022 08:53), Max: 97.9 (04 Aug 2022 08:53)  HR: 60 (04 Aug 2022 08:53) (60 - 77)  BP: 159/61 (04 Aug 2022 08:53) (148/58 - 167/76)  BP(mean): --  RR: 16 (04 Aug 2022 08:53) (16 - 16)  SpO2: 98% (04 Aug 2022 08:53) (98% - 99%)    Parameters below as of 04 Aug 2022 08:53  Patient On (Oxygen Delivery Method): room air        REVIEW OF SYMPTOMS  Neurological deficits- Cognitive deficits.   --Left foot/toe pain  - Denies fevers, chills, chest pain, shortness of breath, abdominal pain, headaches, nausea/vomiting        Physical Exam:  Gen - NAD, Comfortable  HEENT - NCAT, EOMI, MMM  Neck - Supple,   Pulm - breathing comfortably on RA  Cardiovascular - RRR, S1S2,  Abdomen - Soft, NT/ND, +BS  Extremities - No edema, No calf tenderness.  +calor, tenderness of left 1st MTP joint.   Neuro-     Cognitive - AAOx2      Communication - Fluent, Mild dysarthria     Attention: Impaired/Distracted     Judgement: impaired   Psychiatric - Mood stable, Affect Flat              RECENT LABS                        10.5   8.77  )-----------( 231      ( 04 Aug 2022 06:11 )             31.2     08-04    129<L>  |  98  |  28<H>  ----------------------------<  100<H>  4.0   |  19<L>  |  1.02    Ca    8.7      04 Aug 2022 06:11    TPro  7.0  /  Alb  2.7<L>  /  TBili  0.5  /  DBili  x   /  AST  32  /  ALT  54<H>  /  AlkPhos  86  08-04            RADIOLOGY/OTHER RESULTS      MEDICATIONS  (STANDING):  amLODIPine   Tablet 10 milliGRAM(s) Oral daily  apixaban 5 milliGRAM(s) Oral two times a day  AQUAPHOR (petrolatum Ointment) 1 Application(s) Topical daily  atorvastatin 80 milliGRAM(s) Oral at bedtime  bisacodyl 5 milliGRAM(s) Oral daily  donepezil 10 milliGRAM(s) Oral daily  famotidine    Tablet 20 milliGRAM(s) Oral daily  hemorrhoidal Ointment 1 Application(s) Rectal daily  levothyroxine 100 MICROGram(s) Oral daily  losartan 12.5 milliGRAM(s) Oral daily  memantine 10 milliGRAM(s) Oral two times a day  metoprolol succinate ER 50 milliGRAM(s) Oral daily  mirtazapine 7.5 milliGRAM(s) Oral at bedtime  polyethylene glycol 3350 17 Gram(s) Oral daily  QUEtiapine 25 milliGRAM(s) Oral <User Schedule>  ramelteon 8 milliGRAM(s) Oral at bedtime    MEDICATIONS  (PRN):  acetaminophen     Tablet .. 650 milliGRAM(s) Oral every 6 hours PRN Temp greater or equal to 38C (100.4F), Mild Pain (1 - 3), Moderate Pain (4 - 6), Severe Pain (7 - 10)  aluminum hydroxide/magnesium hydroxide/simethicone Suspension 30 milliLiter(s) Oral every 4 hours PRN Dyspepsia  lidocaine   4% Patch 1 Patch Transdermal every 24 hours PRN Pain  lidocaine   4% Patch 1 Patch Transdermal every 24 hours PRN Pain  LORazepam     Tablet 0.25 milliGRAM(s) Oral at bedtime PRN insomnia  LORazepam   Injectable 0.25 milliGRAM(s) IntraMuscular once PRN Agitation  sodium chloride 0.65% Nasal 1 Spray(s) Both Nostrils five times a day PRN Nasal Congestion

## 2022-08-04 NOTE — CONSULT NOTE ADULT - ASSESSMENT
Assessment:    FIM scores: Social Interaction ; Problem Solving ; Memory .  Plan: Individual supportive psychotherapy to monitor cognition, affect/mood, and behavior. Cognitive remediation during speech therapy sessions. Participation in recreation/art therapy in order to have pleasure and mastery experiences and regain/reestablish a sense of routine.    Time spent with Pt,  minutes.   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 Pt exhibits difficulties across the board, including reality orientation, concentration /working memory, memory, visuospatial skills, language and executive functions. Given his level of subjective distress he is likely to be in the early stages of the disease with added deficits following his CVA. Pt's affect is depressed and he reports numerous symptoms of anxiety and depression, partly in response to his current medical condition and circumstances, and partly in response to his hx of depression. FIM scores: Social Interaction 4; Problem Solving 4; Memory 3.  Plan: Individual supportive psychotherapy to monitor cognition, affect/mood, and behavior. Continue with his current psychotropic medications, consider adding antidepressant medication. Cognitive remediation during speech therapy sessions is recommended. Participation in recreation/art therapy in order to have pleasure and mastery experiences and regain/reestablish a sense of routine.  Time spent with Pt, 50 minutes.

## 2022-08-04 NOTE — PROGRESS NOTE ADULT - ASSESSMENT
MIGUEL ORDONEZ is a 81 yo Male with PMH of thyroid ca s/p thyroidectomy 2018, HTN, TIA (15-20 years ago), alzheimer's disease, and depression presented to [facility] ED on 7/15 with acute Left facial droop/ L sided weakness and slurring of speech, found to have Right MCA CVA s/p tPA. H/C complicated by COVID infection and sundowning.  Admitted to Acute rehabilitation with cognitive deficits, dysarthria, gait and ADL impairments.     COMORBIDITES/ACTIVE MEDICAL ISSUES     Gait Instability, ADL impairments and Functional impairments: cont Comprehensive Rehab Program of PT/OT & speech therapy    #CVA of R MCA  -Embolic secondary to ESUS vs. hypercoagulable state secondary to COVID 19+   s/p tPA   Eliquis/Statin to continue until 8/15, then statin+ Aspirin 8/15.   TTE - EF 70%, minimal AR    - ILR 7/27  -PT/OT/SLP evaluations to begin  -fall and aspiration precautions    gout flare  --d/w hospitalist-- Prednisone 5 day course.     # Hypertension.   c/w amlodipine  c/w metoprolol   -monitor BPs.  --SBP goal 120-150    #hyponatremia  --Stable  -Will continue to monitor    # 2019 novel coronavirus disease (COVID-19).   - on Eliquis for hypercoagulable state  - supplemental O2 as needed.    #Dementia  -continue donepezil, memantine, and seroquel-- pt's home medication.    -neuropsych eval, strict fall precautions, reorient    #Depression--  --Cont. Seroquel-- pt. was on this medication at home for depression.   --Cont. mirtazepine.  --- Neuropsych consult     #Dysphagia/dysarthria  - downgraded to minced and moist diet per SLP  - Vital Stim during SLP  - May need repeat MBSS    #Sleep/Insomnia  -cont. Rozerem  --Trial-PRN ativan 0.25mg qhs    Pain Mgmt - Tylenol PRN  GI/Bowel Mgmt -  Senna,  Miralax PRN  /Bladder Mgmt - Voiding independently, Check PVR     Precautions / PROPHYLAXIS:   - Falls, Cardiac, Seizure   - Lungs: Aspiration,  -- DVT: on Eliquis    IDT 8/2--  SW: lives in  with spouse and children, 1 ASHANTI,   OT: Supervision-- e/g/dressing; Min A LBD, toileting and functional transfers. Goal: supervision  PT: Supervision--Transfers, ambulation 100ft, and CG--12 steps 2 HR. Goal: supervision  Sp: Vital stim, Cognition fluctuates, Severe memory, poor carryover, hypophonia, dysarthria  ELOS: 8/6 Home

## 2022-08-05 ENCOUNTER — TRANSCRIPTION ENCOUNTER (OUTPATIENT)
Age: 82
End: 2022-08-05

## 2022-08-05 PROCEDURE — 99233 SBSQ HOSP IP/OBS HIGH 50: CPT

## 2022-08-05 RX ORDER — POLYETHYLENE GLYCOL 3350 17 G/17G
17 POWDER, FOR SOLUTION ORAL
Qty: 0 | Refills: 0 | DISCHARGE
Start: 2022-08-05

## 2022-08-05 RX ORDER — MIRTAZAPINE 45 MG/1
1 TABLET, ORALLY DISINTEGRATING ORAL
Qty: 30 | Refills: 0
Start: 2022-08-05

## 2022-08-05 RX ORDER — MEMANTINE HYDROCHLORIDE 10 MG/1
1 TABLET ORAL
Qty: 60 | Refills: 0
Start: 2022-08-05

## 2022-08-05 RX ORDER — METOPROLOL TARTRATE 50 MG
1 TABLET ORAL
Qty: 30 | Refills: 0
Start: 2022-08-05

## 2022-08-05 RX ORDER — ASPIRIN/CALCIUM CARB/MAGNESIUM 324 MG
1 TABLET ORAL
Qty: 0 | Refills: 0 | DISCHARGE

## 2022-08-05 RX ORDER — LEVOTHYROXINE SODIUM 125 MCG
1 TABLET ORAL
Qty: 30 | Refills: 0
Start: 2022-08-05

## 2022-08-05 RX ORDER — DONEPEZIL HYDROCHLORIDE 10 MG/1
1 TABLET, FILM COATED ORAL
Qty: 30 | Refills: 0
Start: 2022-08-05

## 2022-08-05 RX ORDER — LOSARTAN POTASSIUM 100 MG/1
0.5 TABLET, FILM COATED ORAL
Qty: 15 | Refills: 0
Start: 2022-08-05 | End: 2022-09-03

## 2022-08-05 RX ORDER — ACETAMINOPHEN 500 MG
2 TABLET ORAL
Qty: 0 | Refills: 0 | DISCHARGE
Start: 2022-08-05

## 2022-08-05 RX ORDER — QUETIAPINE FUMARATE 200 MG/1
1 TABLET, FILM COATED ORAL
Qty: 30 | Refills: 0
Start: 2022-08-05

## 2022-08-05 RX ORDER — AMLODIPINE BESYLATE 2.5 MG/1
1 TABLET ORAL
Qty: 30 | Refills: 0
Start: 2022-08-05

## 2022-08-05 RX ORDER — APIXABAN 2.5 MG/1
1 TABLET, FILM COATED ORAL
Qty: 18 | Refills: 0
Start: 2022-08-05 | End: 2022-08-13

## 2022-08-05 RX ORDER — FAMOTIDINE 10 MG/ML
1 INJECTION INTRAVENOUS
Qty: 30 | Refills: 0
Start: 2022-08-05

## 2022-08-05 RX ORDER — ATORVASTATIN CALCIUM 80 MG/1
1 TABLET, FILM COATED ORAL
Qty: 30 | Refills: 0
Start: 2022-08-05

## 2022-08-05 RX ADMIN — Medication 20 MILLIGRAM(S): at 05:14

## 2022-08-05 RX ADMIN — APIXABAN 5 MILLIGRAM(S): 2.5 TABLET, FILM COATED ORAL at 05:14

## 2022-08-05 RX ADMIN — FAMOTIDINE 20 MILLIGRAM(S): 10 INJECTION INTRAVENOUS at 12:08

## 2022-08-05 RX ADMIN — ATORVASTATIN CALCIUM 80 MILLIGRAM(S): 80 TABLET, FILM COATED ORAL at 21:00

## 2022-08-05 RX ADMIN — PHENYLEPHRINE-SHARK LIVER OIL-MINERAL OIL-PETROLATUM RECTAL OINTMENT 1 APPLICATION(S): at 12:10

## 2022-08-05 RX ADMIN — APIXABAN 5 MILLIGRAM(S): 2.5 TABLET, FILM COATED ORAL at 17:32

## 2022-08-05 RX ADMIN — DONEPEZIL HYDROCHLORIDE 10 MILLIGRAM(S): 10 TABLET, FILM COATED ORAL at 12:09

## 2022-08-05 RX ADMIN — Medication 100 MICROGRAM(S): at 05:14

## 2022-08-05 RX ADMIN — AMLODIPINE BESYLATE 10 MILLIGRAM(S): 2.5 TABLET ORAL at 05:14

## 2022-08-05 RX ADMIN — MEMANTINE HYDROCHLORIDE 10 MILLIGRAM(S): 10 TABLET ORAL at 05:14

## 2022-08-05 RX ADMIN — QUETIAPINE FUMARATE 25 MILLIGRAM(S): 200 TABLET, FILM COATED ORAL at 18:54

## 2022-08-05 RX ADMIN — Medication 1 APPLICATION(S): at 12:07

## 2022-08-05 RX ADMIN — Medication 50 MILLIGRAM(S): at 05:15

## 2022-08-05 RX ADMIN — MIRTAZAPINE 7.5 MILLIGRAM(S): 45 TABLET, ORALLY DISINTEGRATING ORAL at 21:00

## 2022-08-05 RX ADMIN — Medication 5 MILLIGRAM(S): at 12:08

## 2022-08-05 RX ADMIN — Medication 8 MILLIGRAM(S): at 21:00

## 2022-08-05 RX ADMIN — LOSARTAN POTASSIUM 12.5 MILLIGRAM(S): 100 TABLET, FILM COATED ORAL at 05:14

## 2022-08-05 RX ADMIN — MEMANTINE HYDROCHLORIDE 10 MILLIGRAM(S): 10 TABLET ORAL at 17:33

## 2022-08-05 NOTE — DISCHARGE NOTE NURSING/CASE MANAGEMENT/SOCIAL WORK - PATIENT PORTAL LINK FT
You can access the FollowMyHealth Patient Portal offered by [facility] by registering at the following website: [email or web address]. By joining [facility]’s FollowMyHealth portal, you will also be able to view your health information using other applications (apps) compatible with our system.

## 2022-08-05 NOTE — DISCHARGE NOTE PROVIDER - NSDCCPCAREPLAN_GEN_ALL_CORE_FT
PRINCIPAL DISCHARGE DIAGNOSIS  Diagnosis: Right-sided cerebrovascular accident (CVA)  Assessment and Plan of Treatment: Follow up with Neurology as outpatient.      SECONDARY DISCHARGE DIAGNOSES  Diagnosis: Dementia  Assessment and Plan of Treatment: Cont. 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 Home meds mirtazepine & seroquel    Diagnosis: Hypercoagulable state  Assessment and Plan of Treatment: due to COVID -19 which resulted in Stroke.  Cont. Eliquis until 8/14, then restart ASA 81mg

## 2022-08-05 NOTE — DISCHARGE NOTE NURSING/CASE MANAGEMENT/SOCIAL WORK - NSDCVIVACCINE_GEN_ALL_CORE_FT
Pneumococcal conjugate PCV 13; 22-Oct-2015 20:21; Aileen Abarca (RN); [place]; [id number]; IntraMuscular; Deltoid Right.; 0.5 milliLiter(s); VIS (VIS Published: 27-Feb-2013, VIS Presented: 22-Oct-2015);

## 2022-08-05 NOTE — PROGRESS NOTE ADULT - ASSESSMENT
MIGUEL ORDONEZ is a 81 yo Male with PMH of thyroid ca s/p thyroidectomy 2018, HTN, TIA (15-20 years ago), alzheimer's disease, and depression presented to [facility] ED on 7/15 with acute Left facial droop/ L sided weakness and slurring of speech, found to have Right MCA CVA s/p tPA. H/C complicated by COVID infection and sundowning.  Admitted to Acute rehabilitation with cognitive deficits, dysarthria, gait and ADL impairments.     COMORBIDITES/ACTIVE MEDICAL ISSUES     Gait Instability, ADL impairments and Functional impairments: cont Comprehensive Rehab Program of PT/OT & speech therapy    #CVA of R MCA  -Embolic secondary to ESUS vs. hypercoagulable state secondary to COVID 19+   s/p tPA   Eliquis/Statin to continue until 8/15, then statin+ Aspirin 8/15.   TTE - EF 70%, minimal AR    - ILR 7/27  -PT/OT/SLP evaluations to begin  -fall and aspiration precautions    gout flare  --d/w hospitalist-- Prednisone 5 day course--completing tomorrow.     # Hypertension.   c/w amlodipine  c/w metoprolol   --c/w losartan  -BP controlled  --SBP goal 120-150    #hyponatremia  --Stable      # 2019 novel coronavirus disease (COVID-19).   - on Eliquis for hypercoagulable state  - supplemental O2 as needed.    #Dementia  -continue donepezil, memantine, and seroquel-- pt's home medication.    -neuropsych eval, strict fall precautions, reorient    #Depression--  --Cont. Seroquel-- pt. was on this medication at home for depression.   --Cont. mirtazepine.  --- Neuropsych consult     #Dysphagia/dysarthria  - downgraded to minced and moist diet per SLP  - Vital Stim during SLP  - May need repeat MBSS    #Sleep/Insomnia  -cont. Rozerem  -PRN ativan 0.25mg qhs    Pain Mgmt - Tylenol PRN  GI/Bowel Mgmt -  Senna,  Miralax PRN  /Bladder Mgmt - Voiding independently,  PVR --low    Precautions / PROPHYLAXIS:   - Falls, Cardiac, Seizure   - Lungs: Aspiration,  -- DVT: on Eliquis    IDT 8/2--  SW: lives in  with spouse and children, 1 ASHANTI,   OT: Supervision-- e/g/dressing; Min A LBD, toileting and functional transfers. Goal: supervision  PT: Supervision--Transfers, ambulation 100ft, and CG--12 steps 2 HR. Goal: supervision  Sp: Vital stim, Cognition fluctuates, Severe memory, poor carryover, hypophonia, dysarthria  ELOS: 8/6 Home     MIGUEL ORDONEZ is a 81 yo Male with PMH of thyroid ca s/p thyroidectomy 2018, HTN, TIA (15-20 years ago), alzheimer's disease, and depression presented to [facility] ED on 7/15 with acute Left facial droop/ L sided weakness and slurring of speech, found to have Right MCA CVA s/p tPA. H/C complicated by COVID infection and sundowning.  Admitted to Acute rehabilitation with cognitive deficits, dysarthria, gait and ADL impairments.     COMORBIDITES/ACTIVE MEDICAL ISSUES     Gait Instability, ADL impairments and Functional impairments: cont Comprehensive Rehab Program of PT/OT & speech therapy    #CVA of R MCA  -Embolic secondary to ESUS vs. hypercoagulable state secondary to COVID 19+   s/p tPA   Eliquis/Statin to continue until 8/15, then statin+ Aspirin 8/15.   TTE - EF 70%, minimal AR    - ILR 7/27  -PT/OT/SLP evaluations to begin  -fall and aspiration precautions    gout flare--Resolved  --d/w hospitalist-- Prednisone 5 day course--completing tomorrow.     # Hypertension.   c/w amlodipine  c/w metoprolol   --c/w losartan  -BP controlled  --SBP goal 120-150    #hyponatremia  --Stable    # 2019 novel coronavirus disease (COVID-19).   - on Eliquis for hypercoagulable state  - supplemental O2 as needed.    #Dementia  -continue donepezil, memantine, and seroquel-- pt's home medication.    -neuropsych eval, strict fall precautions, reorient    #Depression--  --Cont. Seroquel-- pt. was on this medication at home for depression.   --Cont. mirtazepine.  --- Neuropsych consult reviewed and appreciated--exhibits difficulties across the board, including reality orientation, concentration /working memory, memory, visuospatial skills, language and executive functions. Given his level of subjective distress he is likely to be in the early stages of the disease with added deficits following his CVA. Pt's affect is depressed and he reports numerous symptoms of anxiety and depression, partly in response to his current medical condition and circumstances, and partly in response to his hx of depression. Plan: Individual supportive psychotherapy to monitor cognition, affect/mood, and behavior. Continue with his current psychotropic medications,    #Dysphagia/dysarthria  - downgraded to minced and moist diet per SLP  - Vital Stim during SLP      #Sleep/Insomnia  -cont. Rozerem  -PRN ativan 0.25mg qhs    Pain Mgmt - Tylenol PRN  GI/Bowel Mgmt -  Senna,  Miralax PRN  /Bladder Mgmt - Voiding independently,  PVR --low    Precautions / PROPHYLAXIS:   - Falls, Cardiac, Seizure   - Lungs: Aspiration,  -- DVT: on Eliquis    IDT 8/2--  SW: lives in  with spouse and children, 1 ASHANTI,   OT: Supervision-- e/g/dressing; Min A LBD, toileting and functional transfers. Goal: supervision  PT: Supervision--Transfers, ambulation 100ft, and CG--12 steps 2 HR. Goal: supervision  Sp: Vital stim, Cognition fluctuates, Severe memory, poor carryover, hypophonia, dysarthria  ELOS: 8/6 Home

## 2022-08-05 NOTE — DISCHARGE NOTE PROVIDER - PROVIDER TOKENS
PROVIDER:[TOKEN:[[id number]],FOLLOWUP:[2 weeks]],PROVIDER:[TOKEN:[[id number]],FOLLOWUP:[2 weeks]]

## 2022-08-05 NOTE — DISCHARGE NOTE NURSING/CASE MANAGEMENT/SOCIAL WORK - NSDCPEFALRISK_GEN_ALL_CORE
For information on Fall & Injury Prevention, visit: [email or web address] OR  [email or web address] OR  https://www.cdc.gov/steadi/patient.html

## 2022-08-05 NOTE — DISCHARGE NOTE PROVIDER - NSDCMRMEDTOKEN_GEN_ALL_CORE_FT
acetaminophen 325 mg oral tablet: 2 tab(s) orally every 6 hours, As needed, Temp greater or equal to 38C (100.4F), Mild Pain (1 - 3), Moderate Pain (4 - 6), Severe Pain (7 - 10)  amLODIPine 10 mg oral tablet: 1 tab(s) orally once a day  apixaban 5 mg oral tablet: 1 tab(s) orally 2 times a day  atorvastatin 80 mg oral tablet: 1 tab(s) orally once a day (at bedtime)  donepezil 10 mg oral tablet: 1 tab(s) orally once a day  famotidine 20 mg oral tablet: 1 tab(s) orally once a day  levothyroxine 100 mcg (0.1 mg) oral tablet: 1 tab(s) orally once a day  losartan 25 mg oral tablet: 0.5 tab(s) orally once a day   memantine 10 mg oral tablet: 1 tab(s) orally 2 times a day  metoprolol succinate 50 mg oral tablet, extended release: 1 tab(s) orally once a day  mirtazapine 7.5 mg oral tablet: 1 tab(s) orally once a day (at bedtime)  polyethylene glycol 3350 oral powder for reconstitution: 17 gram(s) orally once a day  QUEtiapine 25 mg oral tablet: 1 tab(s) orally once a day (at bedtime)

## 2022-08-05 NOTE — DISCHARGE NOTE PROVIDER - HOSPITAL COURSE
81 yo Male with PMH of thyroid ca s/p thyroidectomy 2018, HTN, TIA (15-20 years ago), alzheimer's disease, and depression presented to [facility] ED on 7/15 with acute Left facial droop/ L sided weakness and slurring of speech. CTH 7/15 neg, MR shows right MCA infarct. S/p Tpa. No LVO/no thrombectomy. EP consulted for ILR. Eliquis started for Covid+ r/o hypercoagulable state. DDimer >2000. ID following, no indication for antivirals, asymptomatic. Transition to ASA on 8/15 (1 month post covid AC). HgA1C 6.1 %, . CXR 7/18 No focal infiltrates. No pleural effusion or pneumothorax. TTE EF70%, unremarkable, cardiac monitoring: no events. Passed dysphagia screen, aspiration precautions, s/p MBS 7/18, upgraded regular solids with thin liquids. Hyponatremia to monitor closely. Will need anticoagulation for 1 month due to elevated D-Dimer. LE doppler 7/19 and on 7/24 negative for DVT. Depression, Dementia with sundowning at baseline- home regimen Seroquel, Mirtazepine, Donepezil, Namenda. Agitated on 7/19, improved, no longer on restraints. Impression: L hemiparesis and dysarthria due to acute ischemic stroke R MCA . Mechanism is unclear, Embolic secondary to ESUS vs. hypercoagulable state secondary to COVID 19+. Eliquis given elevated D-Dimer (2086) hypercoagulable state secondary to Covid infection, switch to ASA on 08/15 for stroke prevention. TTE shows EF70%, unremarkable, ILR placed to rule out A. Fib as possible source. Evaluated by PMR and acute rehab recommended. Cleared for dc on 7/27. Evaluated by PT/OT and was recommended AR. Patient stable for discharge.      83 yo Male with PMH of thyroid ca s/p thyroidectomy 2018, HTN, TIA (15-20 years ago), alzheimer's disease, and depression presented to [facility] ED on 7/15 with acute Left facial droop/ L sided weakness and slurring of speech. CTH 7/15 neg, MR shows right MCA infarct. S/p Tpa. No LVO/no thrombectomy. EP consulted for ILR. Eliquis started for Covid+ r/o hypercoagulable state. DDimer >2000. ID following, no indication for antivirals, asymptomatic. Transition to ASA on 8/15 (1 month post covid AC). HgA1C 6.1 %, . CXR 7/18 No focal infiltrates. No pleural effusion or pneumothorax. TTE EF70%, unremarkable, cardiac monitoring: no events. Passed dysphagia screen, aspiration precautions, s/p MBS 7/18, upgraded regular solids with thin liquids. Hyponatremia to monitor closely. Will need anticoagulation for 1 month due to elevated D-Dimer. LE doppler 7/19 and on 7/24 negative for DVT. Depression, Dementia with sundowning at baseline- home regimen Seroquel, Mirtazepine, Donepezil, Namenda. Agitated on 7/19, improved, no longer on restraints. Impression: L hemiparesis and dysarthria due to acute ischemic stroke R MCA . Mechanism is unclear, Embolic secondary to ESUS vs. hypercoagulable state secondary to COVID 19+. Eliquis given elevated D-Dimer (2086) hypercoagulable state secondary to Covid infection, switch to ASA on 08/15 for stroke prevention. TTE shows EF70%, unremarkable, ILR placed to rule out A. Fib as possible source. Evaluated by PMR and acute rehab recommended. Cleared for dc on 7/27. Evaluated by PT/OT and was recommended AR. Patient stable for discharge.       Patient admitted to acute rehabilitation on 7/27/22.   Hospital course complicated by gout flare on 8/3/22-- patient treated with Prednisone course. Gout resolved.  Adjusted medications for sleep.     Discharge function:   OT: Supervision-- e/g/dressing; CG LBD, toileting and functional transfers.   PT: Supervision--Transfers, ambulation 100ft, and CG--12 steps 2 HR.   Sp: Vital stim, Cognition fluctuates, Severe memory, poor carryover, hypophonia, dysarthria

## 2022-08-05 NOTE — DISCHARGE NOTE PROVIDER - CARE PROVIDER_API CALL
Catherine Bansal (NP; RN)  NP in Family Health  [address], [address]  [place] [address]  Phone: [phone]  Fax: [phone]  Follow Up Time: 2 weeks    Gonzalo Cooper ()  Cardiology; Internal Medicine  [address], [address]  [place] [address]  Phone: [phone]  Fax: [phone]  Follow Up Time: 2 weeks

## 2022-08-05 NOTE — PROGRESS NOTE ADULT - SUBJECTIVE AND OBJECTIVE BOX
HPI:  83 yo Male with PMH of thyroid ca s/p thyroidectomy 2018, HTN, TIA (15-20 years ago), alzheimer's disease, and depression presented to [facility] ED on 7/15 with acute Left facial droop/ L sided weakness and slurring of speech. CTH 7/15 neg, MR shows right MCA infarct. S/p Tpa. No LVO/no thrombectomy. EP consulted for ILR. Eliquis started for Covid+ r/o hypercoagulable state. DDimer >2000. ID following, no indication for antivirals, asymptomatic. Transition to ASA on 8/15 (1 month post covid AC). HgA1C 6.1 %, . CXR 7/18 No focal infiltrates. No pleural effusion or pneumothorax. TTE EF70%, unremarkable, cardiac monitoring: no events. Passed dysphagia screen, aspiration precautions, s/p MBS 7/18, upgraded regular solids with thin liquids. Hyponatremia to monitor closely. Will need anticoagulation for 1 month due to elevated D-Dimer. LE doppler 7/19 and on 7/24 negative for DVT. Depression, Dementia with sundowning at baseline- home regimen Seroquel, Mirtazepine, Donepezil, Namenda. Agitated on 7/19, improved, no longer on restraints. Impression: L hemiparesis and dysarthria due to acute ischemic stroke R MCA . Mechanism is unclear, Embolic secondary to ESUS vs. hypercoagulable state secondary to COVID 19+. Eliquis given elevated D-Dimer (2086) hypercoagulable state secondary to Covid infection, switch to ASA on 08/15 for stroke prevention. TTE shows EF70%, unremarkable, ILR placed to rule out A. Fib as possible source. Evaluated by PMR and acute rehab recommended. Cleared for dc on 7/27. Evaluated by PT/OT and was recommended AR. Patient stable for discharge.               Subjective: slept through the night after 11pm as per nursing.  Pt.'s left foot pain resolved.        VITALS  Vital Signs Last 24 Hrs  T(C): 36.8 (05 Aug 2022 08:38), Max: 36.8 (05 Aug 2022 08:38)  T(F): 98.3 (05 Aug 2022 08:38), Max: 98.3 (05 Aug 2022 08:38)  HR: 58 (05 Aug 2022 08:38) (58 - 73)  BP: 135/63 (05 Aug 2022 08:38) (125/58 - 154/69)  BP(mean): --  RR: 16 (05 Aug 2022 08:38) (15 - 16)  SpO2: 100% (05 Aug 2022 08:38) (98% - 100%)    Parameters below as of 05 Aug 2022 08:38  Patient On (Oxygen Delivery Method): room air        REVIEW OF SYMPTOMS  Neurological deficits-- Cognitive deficits.   --Left foot/toe pain  - Denies fevers, chills, chest pain, shortness of breath, abdominal pain, headaches, nausea/vomiting        Physical Exam:  Gen - NAD, Comfortable  HEENT - NCAT, EOMI, MMM  Neck - Supple,   Pulm - breathing comfortably on RA  Cardiovascular - RRR, S1S2,  Abdomen - Soft, NT/ND, +BS  Extremities - No edema, No calf tenderness.  +calor, tenderness of left 1st MTP joint.   Neuro-     Cognitive - AAOx2      Communication - Fluent, Mild dysarthria     Attention: Impaired/Distracted     Judgement: impaired   Psychiatric - Mood stable, Affect Flat            RECENT LABS                        10.5   8.77  )-----------( 231      ( 04 Aug 2022 06:11 )             31.2     08-04    129<L>  |  98  |  28<H>  ----------------------------<  100<H>  4.0   |  19<L>  |  1.02    Ca    8.7      04 Aug 2022 06:11    TPro  7.0  /  Alb  2.7<L>  /  TBili  0.5  /  DBili  x   /  AST  32  /  ALT  54<H>  /  AlkPhos  86  08-04            RADIOLOGY/OTHER RESULTS      MEDICATIONS  (STANDING):  amLODIPine   Tablet 10 milliGRAM(s) Oral daily  apixaban 5 milliGRAM(s) Oral two times a day  AQUAPHOR (petrolatum Ointment) 1 Application(s) Topical daily  atorvastatin 80 milliGRAM(s) Oral at bedtime  bisacodyl 5 milliGRAM(s) Oral daily  donepezil 10 milliGRAM(s) Oral daily  famotidine    Tablet 20 milliGRAM(s) Oral daily  hemorrhoidal Ointment 1 Application(s) Rectal daily  levothyroxine 100 MICROGram(s) Oral daily  losartan 12.5 milliGRAM(s) Oral daily  memantine 10 milliGRAM(s) Oral two times a day  metoprolol succinate ER 50 milliGRAM(s) Oral daily  mirtazapine 7.5 milliGRAM(s) Oral at bedtime  polyethylene glycol 3350 17 Gram(s) Oral daily  predniSONE   Tablet   Oral   QUEtiapine 25 milliGRAM(s) Oral <User Schedule>  ramelteon 8 milliGRAM(s) Oral at bedtime    MEDICATIONS  (PRN):  acetaminophen     Tablet .. 650 milliGRAM(s) Oral every 6 hours PRN Temp greater or equal to 38C (100.4F), Mild Pain (1 - 3), Moderate Pain (4 - 6), Severe Pain (7 - 10)  aluminum hydroxide/magnesium hydroxide/simethicone Suspension 30 milliLiter(s) Oral every 4 hours PRN Dyspepsia  LORazepam     Tablet 0.25 milliGRAM(s) Oral at bedtime PRN insomnia  LORazepam   Injectable 0.25 milliGRAM(s) IntraMuscular once PRN Agitation  sodium chloride 0.65% Nasal 1 Spray(s) Both Nostrils five times a day PRN Nasal Congestion         HPI:  81 yo Male with PMH of thyroid ca s/p thyroidectomy 2018, HTN, TIA (15-20 years ago), alzheimer's disease, and depression presented to [facility] ED on 7/15 with acute Left facial droop/ L sided weakness and slurring of speech. CTH 7/15 neg, MR shows right MCA infarct. S/p Tpa. No LVO/no thrombectomy. EP consulted for ILR. Eliquis started for Covid+ r/o hypercoagulable state. DDimer >2000. ID following, no indication for antivirals, asymptomatic. Transition to ASA on 8/15 (1 month post covid AC). HgA1C 6.1 %, . CXR 7/18 No focal infiltrates. No pleural effusion or pneumothorax. TTE EF70%, unremarkable, cardiac monitoring: no events. Passed dysphagia screen, aspiration precautions, s/p MBS 7/18, upgraded regular solids with thin liquids. Hyponatremia to monitor closely. Will need anticoagulation for 1 month due to elevated D-Dimer. LE doppler 7/19 and on 7/24 negative for DVT. Depression, Dementia with sundowning at baseline- home regimen Seroquel, Mirtazepine, Donepezil, Namenda. Agitated on 7/19, improved, no longer on restraints. Impression: L hemiparesis and dysarthria due to acute ischemic stroke R MCA . Mechanism is unclear, Embolic secondary to ESUS vs. hypercoagulable state secondary to COVID 19+. Eliquis given elevated D-Dimer (2086) hypercoagulable state secondary to Covid infection, switch to ASA on 08/15 for stroke prevention. TTE shows EF70%, unremarkable, ILR placed to rule out A. Fib as possible source. Evaluated by PMR and acute rehab recommended. Cleared for dc on 7/27. Evaluated by PT/OT and was recommended AR. Patient stable for discharge.               Subjective: slept through the night after 11pm as per nursing.  Pt.'s left foot pain resolved.        VITALS  Vital Signs Last 24 Hrs  T(C): 36.8 (05 Aug 2022 08:38), Max: 36.8 (05 Aug 2022 08:38)  T(F): 98.3 (05 Aug 2022 08:38), Max: 98.3 (05 Aug 2022 08:38)  HR: 58 (05 Aug 2022 08:38) (58 - 73)  BP: 135/63 (05 Aug 2022 08:38) (125/58 - 154/69)  BP(mean): --  RR: 16 (05 Aug 2022 08:38) (15 - 16)  SpO2: 100% (05 Aug 2022 08:38) (98% - 100%)    Parameters below as of 05 Aug 2022 08:38  Patient On (Oxygen Delivery Method): room air        REVIEW OF SYMPTOMS  Neurological deficits-- Cognitive deficits.   --Left foot/toe pain  - Denies fevers, chills, chest pain, shortness of breath, abdominal pain, headaches, nausea/vomiting        Physical Exam:  Gen - NAD, Comfortable  HEENT - NCAT, EOMI, MMM  Neck - Supple,   Pulm - breathing comfortably on RA  Cardiovascular - RRR, S1S2,  Abdomen - Soft, NT/ND, +BS  Extremities - No edema, No calf tenderness.  +No calor or tenderness of left 1st MTP joint-- resolved   Neuro-     Cognitive - AAOx2      Communication - Fluent, Mild dysarthria     Attention: Impaired/Distracted     Judgement: impaired   Psychiatric - Mood stable, Affect Flat            RECENT LABS                        10.5   8.77  )-----------( 231      ( 04 Aug 2022 06:11 )             31.2     08-04    129<L>  |  98  |  28<H>  ----------------------------<  100<H>  4.0   |  19<L>  |  1.02    Ca    8.7      04 Aug 2022 06:11    TPro  7.0  /  Alb  2.7<L>  /  TBili  0.5  /  DBili  x   /  AST  32  /  ALT  54<H>  /  AlkPhos  86  08-04            RADIOLOGY/OTHER RESULTS      MEDICATIONS  (STANDING):  amLODIPine   Tablet 10 milliGRAM(s) Oral daily  apixaban 5 milliGRAM(s) Oral two times a day  AQUAPHOR (petrolatum Ointment) 1 Application(s) Topical daily  atorvastatin 80 milliGRAM(s) Oral at bedtime  bisacodyl 5 milliGRAM(s) Oral daily  donepezil 10 milliGRAM(s) Oral daily  famotidine    Tablet 20 milliGRAM(s) Oral daily  hemorrhoidal Ointment 1 Application(s) Rectal daily  levothyroxine 100 MICROGram(s) Oral daily  losartan 12.5 milliGRAM(s) Oral daily  memantine 10 milliGRAM(s) Oral two times a day  metoprolol succinate ER 50 milliGRAM(s) Oral daily  mirtazapine 7.5 milliGRAM(s) Oral at bedtime  polyethylene glycol 3350 17 Gram(s) Oral daily  predniSONE   Tablet   Oral   QUEtiapine 25 milliGRAM(s) Oral <User Schedule>  ramelteon 8 milliGRAM(s) Oral at bedtime    MEDICATIONS  (PRN):  acetaminophen     Tablet .. 650 milliGRAM(s) Oral every 6 hours PRN Temp greater or equal to 38C (100.4F), Mild Pain (1 - 3), Moderate Pain (4 - 6), Severe Pain (7 - 10)  aluminum hydroxide/magnesium hydroxide/simethicone Suspension 30 milliLiter(s) Oral every 4 hours PRN Dyspepsia  LORazepam     Tablet 0.25 milliGRAM(s) Oral at bedtime PRN insomnia  LORazepam   Injectable 0.25 milliGRAM(s) IntraMuscular once PRN Agitation  sodium chloride 0.65% Nasal 1 Spray(s) Both Nostrils five times a day PRN Nasal Congestion

## 2022-08-05 NOTE — PROGRESS NOTE ADULT - TIME BILLING
** Spoke with pt's  daughter,  to provide update on pt's status,  medical update, medications,  progress in therapy, plan of care, general prognosis, and   discharge planning and needs.  Pt. will have supervision/ at home from family.  All questions answered.
discussion with pt's daughter.

## 2022-08-05 NOTE — DISCHARGE NOTE PROVIDER - NSDCFUADDINST_GEN_ALL_CORE_FT
Follow up with Cardiology for Loop Recorder Reading    Supervision with all activities as advised by rehabilitation team    Avoid alcohol and unprescribed medications as these can impair the brain's recovery process.

## 2022-08-05 NOTE — PROGRESS NOTE ADULT - NUTRITIONAL ASSESSMENT
Diet, Minced and Moist:   No Beef  No Pork (07-29-22 @ 13:02) [Active]

## 2022-08-06 VITALS
OXYGEN SATURATION: 99 % | HEART RATE: 62 BPM | DIASTOLIC BLOOD PRESSURE: 65 MMHG | TEMPERATURE: 98 F | SYSTOLIC BLOOD PRESSURE: 152 MMHG | RESPIRATION RATE: 16 BRPM

## 2022-08-06 DIAGNOSIS — F03.90 UNSPECIFIED DEMENTIA WITHOUT BEHAVIORAL DISTURBANCE: ICD-10-CM

## 2022-08-06 DIAGNOSIS — U09.9 POST COVID-19 CONDITION, UNSPECIFIED: ICD-10-CM

## 2022-08-06 DIAGNOSIS — I63.511 CEREBRAL INFARCTION DUE TO UNSPECIFIED OCCLUSION OR STENOSIS OF RIGHT MIDDLE CEREBRAL ARTERY: ICD-10-CM

## 2022-08-06 DIAGNOSIS — I10 ESSENTIAL (PRIMARY) HYPERTENSION: ICD-10-CM

## 2022-08-06 DIAGNOSIS — E87.1 HYPO-OSMOLALITY AND HYPONATREMIA: ICD-10-CM

## 2022-08-06 PROCEDURE — 99232 SBSQ HOSP IP/OBS MODERATE 35: CPT

## 2022-08-06 PROCEDURE — 99238 HOSP IP/OBS DSCHRG MGMT 30/<: CPT

## 2022-08-06 RX ADMIN — DONEPEZIL HYDROCHLORIDE 10 MILLIGRAM(S): 10 TABLET, FILM COATED ORAL at 11:36

## 2022-08-06 RX ADMIN — LOSARTAN POTASSIUM 12.5 MILLIGRAM(S): 100 TABLET, FILM COATED ORAL at 06:33

## 2022-08-06 RX ADMIN — Medication 50 MILLIGRAM(S): at 06:34

## 2022-08-06 RX ADMIN — APIXABAN 5 MILLIGRAM(S): 2.5 TABLET, FILM COATED ORAL at 06:33

## 2022-08-06 RX ADMIN — AMLODIPINE BESYLATE 10 MILLIGRAM(S): 2.5 TABLET ORAL at 06:33

## 2022-08-06 RX ADMIN — Medication 10 MILLIGRAM(S): at 06:34

## 2022-08-06 RX ADMIN — FAMOTIDINE 20 MILLIGRAM(S): 10 INJECTION INTRAVENOUS at 11:35

## 2022-08-06 RX ADMIN — Medication 1 APPLICATION(S): at 11:34

## 2022-08-06 RX ADMIN — MEMANTINE HYDROCHLORIDE 10 MILLIGRAM(S): 10 TABLET ORAL at 06:33

## 2022-08-06 RX ADMIN — Medication 100 MICROGRAM(S): at 06:33

## 2022-08-06 NOTE — PROGRESS NOTE ADULT - ASSESSMENT
MIGUEL ORDONEZ is a 83 yo Male with PMH of thyroid ca s/p thyroidectomy 2018, HTN, TIA (15-20 years ago), alzheimer's disease, and depression presented to [facility] ED on 7/15 with acute Left facial droop/ L sided weakness and slurring of speech, found to have Right MCA CVA s/p tPA. H/C complicated by COVID infection and sundowning.  Admitted to Acute rehabilitation with cognitive deficits, dysarthria, gait and ADL impairments.     #Possible acute gout left great toe  -Will do prednisone taper as written    #CVA of R MCA  -Embolic secondary to ESUS vs. hypercoagulable state secondary to COVID 19+   s/p tPA   Eliquis/Statin to continue until 8/15, then statin+ Aspirin 8/15.   TTE - EF 70%, minimal AR   -ILR 7/27  -medically stable for discharge after completing comprehensive acute rehab program  -fall and aspiration precautions    # Hypertension.   -continue losartan 12.5mg  amlodipine 10mg daily  metoprolol ER 50mg daily  - follow up w/ PCP after discharge    # 2019 novel coronavirus disease (COVID-19).   - on Eliquis 5mg BID for hypercoagulable state  - supplemental O2 as needed.    #dementia  -continue regimen, evaluated by psych prior rehab for periods of agitation  -neuropsych eval, strict fall precautions, reorient    #hypoantremia  -stable  -monitor    DVT: on Eliquis

## 2022-08-06 NOTE — PROGRESS NOTE ADULT - PROVIDER SPECIALTY LIST ADULT
Hospitalist
Rehab Medicine
Hospitalist
Hospitalist
Neurology
Rehab Medicine

## 2022-08-06 NOTE — PROGRESS NOTE ADULT - SUBJECTIVE AND OBJECTIVE BOX
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    Post covid-19 condition, unspecified    Essential hypertension    Dementia    Hyponatremia            PAST MEDICAL & SURGICAL HISTORY:  Depressive disorder, not elsewhere classified      Hypertension      CVA (cerebral vascular accident)      No significant past surgical history          VITALS  Vital Signs Last 24 Hrs  T(C): 36.6 (06 Aug 2022 08:22), Max: 36.6 (06 Aug 2022 08:22)  T(F): 97.9 (06 Aug 2022 08:22), Max: 97.9 (06 Aug 2022 08:22)  HR: 62 (06 Aug 2022 08:22) (62 - 69)  BP: 152/65 (06 Aug 2022 08:22) (152/65 - 159/65)  BP(mean): --  RR: 16 (06 Aug 2022 08:22) (16 - 16)  SpO2: 99% (06 Aug 2022 08:22) (97% - 99%)    Parameters below as of 06 Aug 2022 08:22  Patient On (Oxygen Delivery Method): room air          PHYSICAL EXAM  Constitutional - NAD, Comfortable  HEENT - NCAT, EOMI  Neck - Supple, No limited ROM  Chest - CTA bilaterally  Cardiovascular - RRR, S1S2  Abdomen -  Soft, NTND  Extremities -  No calf tenderness   Neurologic Exam -                    Cognitive - Awake, Alert     No new focal deficits                  CURRENT MEDICATIONS    MEDICATIONS  (STANDING):  amLODIPine   Tablet 10 milliGRAM(s) Oral daily  apixaban 5 milliGRAM(s) Oral two times a day  AQUAPHOR (petrolatum Ointment) 1 Application(s) Topical daily  atorvastatin 80 milliGRAM(s) Oral at bedtime  bisacodyl 5 milliGRAM(s) Oral daily  donepezil 10 milliGRAM(s) Oral daily  famotidine    Tablet 20 milliGRAM(s) Oral daily  hemorrhoidal Ointment 1 Application(s) Rectal daily  levothyroxine 100 MICROGram(s) Oral daily  losartan 12.5 milliGRAM(s) Oral daily  memantine 10 milliGRAM(s) Oral two times a day  metoprolol succinate ER 50 milliGRAM(s) Oral daily  mirtazapine 7.5 milliGRAM(s) Oral at bedtime  polyethylene glycol 3350 17 Gram(s) Oral daily  QUEtiapine 25 milliGRAM(s) Oral <User Schedule>  ramelteon 8 milliGRAM(s) Oral at bedtime    MEDICATIONS  (PRN):  acetaminophen     Tablet .. 650 milliGRAM(s) Oral every 6 hours PRN Temp greater or equal to 38C (100.4F), Mild Pain (1 - 3), Moderate Pain (4 - 6), Severe Pain (7 - 10)  aluminum hydroxide/magnesium hydroxide/simethicone Suspension 30 milliLiter(s) Oral every 4 hours PRN Dyspepsia  LORazepam     Tablet 0.25 milliGRAM(s) Oral at bedtime PRN insomnia  LORazepam   Injectable 0.25 milliGRAM(s) IntraMuscular once PRN Agitation  sodium chloride 0.65% Nasal 1 Spray(s) Both Nostrils five times a day PRN Nasal Congestion    ASSESSMENT & PLAN        Patient is being discharged home with home care  today.  Discharge instructions were discussed with patient and family, all current medications were sent to the pharmacy. Patient and family were educated on importance of medication compliance,  continued  care with PMD and follow-up care with the specialists in the community. Safety and fall risk precautions  were discussed in detail, counseled on healthy life style modifications.  All questions were answered to their satisfaction.

## 2022-08-06 NOTE — PROGRESS NOTE ADULT - SUBJECTIVE AND OBJECTIVE BOX
Patient is a 82y old  Male who presents with a chief complaint of s/p right MCA CVA (05 Aug 2022 15:07)      History, interim events and clinically pertinent issues reviewed; patient interviewed and examined.  He has no complaints this AM.    REVIEW OF SYMPTOMS: patient denies HA's, CP, palpitations, shortness of breath or upper respiratory symptoms, nausea, vomiting, diarrhea, constipation, dysuria, bruising/bleeding and all other systems were reviewed as negative      ALLERGIES:  No Known Allergies    MEDICATIONS  (STANDING):  amLODIPine   Tablet 10 milliGRAM(s) Oral daily  apixaban 5 milliGRAM(s) Oral two times a day  AQUAPHOR (petrolatum Ointment) 1 Application(s) Topical daily  atorvastatin 80 milliGRAM(s) Oral at bedtime  bisacodyl 5 milliGRAM(s) Oral daily  donepezil 10 milliGRAM(s) Oral daily  famotidine    Tablet 20 milliGRAM(s) Oral daily  hemorrhoidal Ointment 1 Application(s) Rectal daily  levothyroxine 100 MICROGram(s) Oral daily  losartan 12.5 milliGRAM(s) Oral daily  memantine 10 milliGRAM(s) Oral two times a day  metoprolol succinate ER 50 milliGRAM(s) Oral daily  mirtazapine 7.5 milliGRAM(s) Oral at bedtime  polyethylene glycol 3350 17 Gram(s) Oral daily  QUEtiapine 25 milliGRAM(s) Oral <User Schedule>  ramelteon 8 milliGRAM(s) Oral at bedtime    MEDICATIONS  (PRN):  acetaminophen     Tablet .. 650 milliGRAM(s) Oral every 6 hours PRN Temp greater or equal to 38C (100.4F), Mild Pain (1 - 3), Moderate Pain (4 - 6), Severe Pain (7 - 10)  aluminum hydroxide/magnesium hydroxide/simethicone Suspension 30 milliLiter(s) Oral every 4 hours PRN Dyspepsia  LORazepam     Tablet 0.25 milliGRAM(s) Oral at bedtime PRN insomnia  LORazepam   Injectable 0.25 milliGRAM(s) IntraMuscular once PRN Agitation  sodium chloride 0.65% Nasal 1 Spray(s) Both Nostrils five times a day PRN Nasal Congestion    Vital Signs Last 24 Hrs  T(F): 97.9 (06 Aug 2022 08:22), Max: 97.9 (06 Aug 2022 08:22)  HR: 62 (06 Aug 2022 08:22) (62 - 69)  BP: 152/65 (06 Aug 2022 08:22) (152/65 - 159/65)  RR: 16 (06 Aug 2022 08:22) (16 - 16)  SpO2: 99% (06 Aug 2022 08:22) (97% - 99%)  I&O's Summary              PHYSICAL EXAM:  General: NAD, A/O x 3  ENT: MMM  Neck: Supple, No JVD  Lungs: Clear to auscultation bilaterally  Cardio: RRR, S1/S2, No murmurs  Abdomen: Soft, Nontender, Nondistended; Bowel sounds present  Extremities: No calf tenderness, No pitting edema  Neuro: stable no new deficits      LABS:                        10.5   8.77  )-----------( 231      ( 04 Aug 2022 06:11 )             31.2       08-04    129  |  98  |  28  ----------------------------<  100  4.0   |  19  |  1.02    Ca    8.7      04 Aug 2022 06:11    TPro  7.0  /  Alb  2.7  /  TBili  0.5  /  DBili  x   /  AST  32  /  ALT  54  /  AlkPhos  86  08-04                07-16 Chol 189 mg/dL LDL -- HDL 61 mg/dL Trig 102 mg/dL                COVID-19 PCR: NotDetec (08-03-22 @ 06:40)  COVID-19 PCR: NotDetec (07-27-22 @ 17:20)  COVID-19 PCR: NotDetec (07-26-22 @ 06:07)  COVID-19 PCR: Detected (07-19-22 @ 17:00)  COVID-19 PCR: Detected (07-16-22 @ 00:50)

## 2022-08-06 NOTE — PROGRESS NOTE ADULT - REASON FOR ADMISSION
s/p right MCA CVA

## 2022-08-06 NOTE — PROGRESS NOTE ADULT - NSPROGADDITIONALINFOA_GEN_ALL_CORE
I have personally interviewed and examined this patient, reviewed pertinent clinical information, and performed the evaluation and management services provided at today's visit for inpatient medical follow up    I am available to discuss any issues related to the medical care of this patient on the unit, or by phone at [phone]

## 2022-08-22 NOTE — ED PROVIDER NOTE - HIV OFFER
August 22, 2022     Patient: Jeferson Horan  YOB: 1981  Date of Visit: 8/22/2022      To Whom it May Concern:    Primus Prajapati is under my professional care  Roseann Laboy was seen in my office on 8/22/2022  Roseann Laboy may return to school on 8/24  If you have any questions or concerns, please don't hesitate to call           Sincerely,          Trupti Bunch PA-C        CC: No Recipients
Opt out

## 2022-08-25 PROCEDURE — 92507 TX SP LANG VOICE COMM INDIV: CPT

## 2022-08-25 PROCEDURE — 97163 PT EVAL HIGH COMPLEX 45 MIN: CPT

## 2022-08-25 PROCEDURE — 97110 THERAPEUTIC EXERCISES: CPT

## 2022-08-25 PROCEDURE — 97167 OT EVAL HIGH COMPLEX 60 MIN: CPT

## 2022-08-25 PROCEDURE — 80053 COMPREHEN METABOLIC PANEL: CPT

## 2022-08-25 PROCEDURE — 97116 GAIT TRAINING THERAPY: CPT

## 2022-08-25 PROCEDURE — U0003: CPT

## 2022-08-25 PROCEDURE — 97535 SELF CARE MNGMENT TRAINING: CPT

## 2022-08-25 PROCEDURE — 92523 SPEECH SOUND LANG COMPREHEN: CPT

## 2022-08-25 PROCEDURE — 92526 ORAL FUNCTION THERAPY: CPT

## 2022-08-25 PROCEDURE — 85025 COMPLETE CBC W/AUTO DIFF WBC: CPT

## 2022-08-25 PROCEDURE — 92610 EVALUATE SWALLOWING FUNCTION: CPT

## 2022-08-25 PROCEDURE — 36415 COLL VENOUS BLD VENIPUNCTURE: CPT

## 2022-08-25 PROCEDURE — 97530 THERAPEUTIC ACTIVITIES: CPT

## 2022-08-25 PROCEDURE — U0005: CPT

## 2022-09-21 NOTE — OCCUPATIONAL THERAPY INITIAL EVALUATION ADULT - GENERAL OBSERVATIONS, REHAB EVAL
Rapid COVID testing performed today as requested by patient's employer for RTW clearance after testing positive on 9/7/22. Discussed that HR notification of result is necessary and that they will direct him how to proceed based on the result. Discussed current CDC guidance on isolation and masking following a positive results as well.       Results for orders placed or performed in visit on 09/21/22   AMB POC SARS-COV-2 AND INFLUENZA A/B   Result Value Ref Range    SARS-COV-2 RNA, POC Negative     Influenza A Antigen, POC Negative Negative    Influenza B Antigen, POC Negative Negative        Betty Adriano, APRN - CNP Pt received semisupine in bed, slurred speech expressive aphasia, stroke unit monitoring, posey vest, bilateral wrist restraints, alert, confused

## 2023-04-03 NOTE — PROGRESS NOTE ADULT - ASSESSMENT
82 y.o. M with PMH of thyroid ca s/p thyroidectomy in 2018, hyperthyroid, HTN, TIA (15-20 years ago), alzheimer's disease, and depression presented to [facility] ED due to L facial droop, L sided weakness and slurring of speech. LKN 22:00 hr on 7/15. Neuro exam fluctuating but remains with L hemiparesis and severe dysarthria. NIHSS 11 -> NIHSS 6 (does not know month, age, dysarthric, LUE drift, LUE ataxic). mRS 0. tPA administered 5.4 mg IV x1 @ 23:41-42 hr on 7/15. Bolus 49 mg started at 23:43  hr on 7/15. tPA administered > 30 min due to aggressive HTN management with IV medications. He was not a thrombectomy candidate due to no LVO    Impression: L hemiparesis and dysarthria due to acute ischemic stroke R MCA . Mechanism is unclear, Embolic secondary to ESUS vs. hypercoagulable state secondary to COVID 19+     NEURO: Neurologically without change, CTH on 7/22 without change, Continue close monitoring for neurologic deterioration, permissive HTN followed by gradual normotension, HgA1C 6.1 %, - continue high intensity statin,  Physical therapy/OT appreciated and recommended AR. SLP gerda appreciated.     ANTITHROMBOTIC THERAPY:  Eliquis given elevated D-Dimer (2086) hypercoagulable state secondary to Covid infection    PULMONARY:  CXR (07/18): No focal infiltrates. No pleural effusion or pneumothorax, protecting airway, saturating well     CARDIOVASCULAR: TTE shows EF70%, unremarkable, cardiac monitoring: no events, continue Amlodipine to 10 mg daily and Metoprolol ER 50mg daily for BP management                             SBP goal: 110-160mmHg    GASTROINTESTINAL: passed dysphagia screen , aspiration precautions, s/p MBS on 7/18 with recommendations for minced and moist, seen on 7/21 and diet upgraded, tolerating well      Diet: regular solids with thin liquids    RENAL: s/p straight cath on 07/17 given limited urine output. Will continue to monitor closely.      Na Goal: Greater than 135     Rawls: No    HEMATOLOGY: Will likely need anticoagulation for 1 month due to elevated D-Dimer. LE doppler: negative for DVT     DVT ppx: no need as pt is on Eliquis    ENDO: h/o thyroid CA s/p thyroidectomy, postsurgical hypothyroidism: will continue Levothyroxine 100 mcg daily    ID: Covid +, afebrile, ID following: if fever will send blood cultures, Pt is on airborne/contact precautions, no indication for antivirals.    OTHER:  Depression, Dementia with sundowning at baseline.  Restarted home meds including Seroquel 25 mg q hs, Mirtazepine 7.5 mg qhs, Donepezil 10 mg q hs, Namenda 10 mg BID. Agitated on 07/19/22 better with current medication regimen, no longer on restraints. psych consult appreciated, Plan/goals  of care discussed with pt's daughter, Jordyn, over the phone     DISPOSITION: Acute Rehab once completed covid quarantine    CORE MEASURES:        Admission NIHSS: 11     TPA: YES       LDL/HDL: 108/61     Depression Screen:  P     Statin Therapy: Atorvastatin     Dysphagia Screen: PASS     Smoking  NO      Afib  NO     Stroke Education YES    Obtain screening lower extremity venous ultrasound in patients who meet 1 or more of the following criteria as patient is high risk for DVT/PE on admission:   [] History of DVT/PE  []Hypercoagulable states (Factor V Leiden, Cancer, OCP, etc. )  []Prolonged immobility (hemiplegia/hemiparesis/post operative or any other extended immobilization)  [] Transferred from outside facility (Rehab or Long term care)  [] Age </= to 50 82 y.o. M with PMH of thyroid ca s/p thyroidectomy in 2018, hyperthyroid, HTN, TIA (15-20 years ago), alzheimer's disease, and depression presented to [facility] ED due to L facial droop, L sided weakness and slurring of speech. LKN 22:00 hr on 7/15. Neuro exam fluctuating but remains with L hemiparesis and severe dysarthria. NIHSS 11 -> NIHSS 6 (does not know month, age, dysarthric, LUE drift, LUE ataxic). mRS 0. tPA administered 5.4 mg IV x1 @ 23:41-42 hr on 7/15. Bolus 49 mg started at 23:43  hr on 7/15. tPA administered > 30 min due to aggressive HTN management with IV medications. He was not a thrombectomy candidate due to no LVO    Impression: L hemiparesis and dysarthria due to acute ischemic stroke R MCA . Mechanism is unclear, Embolic secondary to ESUS vs. hypercoagulable state secondary to COVID 19+     NEURO: Neurologically without change, CTH on 7/22 without change, Continue close monitoring for neurologic deterioration, permissive HTN followed by gradual normotension, HgA1C 6.1 %, - continue high intensity statin,  Physical therapy/OT appreciated and recommended AR. SLP eval appreciated.     ANTITHROMBOTIC THERAPY:  Eliquis given elevated D-Dimer (2086) hypercoagulable state secondary to Covid infection    PULMONARY:  CXR (07/18): No focal infiltrates. No pleural effusion or pneumothorax, protecting airway, saturating well     CARDIOVASCULAR: TTE shows EF70%, unremarkable, cardiac monitoring: no events, continue Amlodipine to 10 mg daily and Metoprolol ER 50mg daily for BP management. Will need ILR to screen for occult arrythmia prior to discharge.,                              SBP goal: 110-160mmHg    GASTROINTESTINAL: passed dysphagia screen , aspiration precautions, s/p MBS on 7/18 with recommendations for minced and moist, seen on 7/21 and diet upgraded, tolerating well      Diet: regular solids with thin liquids    RENAL: s/p straight cath on 07/17 given limited urine output. Will continue to monitor closely.      Na Goal: Greater than 135     Rawls: No    HEMATOLOGY: Will likely need anticoagulation for 1 month due to elevated D-Dimer. LE doppler: negative for DVT     DVT ppx: no need as pt is on Eliquis    ENDO: h/o thyroid CA s/p thyroidectomy, postsurgical hypothyroidism: will continue Levothyroxine 100 mcg daily    ID: Covid +, afebrile, ID following: if fever will send blood cultures, Pt is on airborne/contact precautions, no indication for antivirals.    OTHER:  Depression, Dementia with sundowning at baseline.  Restarted home meds including Seroquel 25 mg q hs, Mirtazepine 7.5 mg qhs, Donepezil 10 mg q hs, Namenda 10 mg BID. Agitated on 07/19/22 better with current medication regimen, no longer on restraints. psych consult appreciated, Plan/goals  of care discussed with pt's daughter, Jordyn, over the phone     DISPOSITION: Acute Rehab once completed covid quarantine    CORE MEASURES:        Admission NIHSS: 11     TPA: YES       LDL/HDL: 108/61     Depression Screen:  P     Statin Therapy: Atorvastatin     Dysphagia Screen: PASS     Smoking  NO      Afib  NO     Stroke Education YES    Obtain screening lower extremity venous ultrasound in patients who meet 1 or more of the following criteria as patient is high risk for DVT/PE on admission:   [] History of DVT/PE  []Hypercoagulable states (Factor V Leiden, Cancer, OCP, etc. )  []Prolonged immobility (hemiplegia/hemiparesis/post operative or any other extended immobilization)  [] Transferred from outside facility (Rehab or Long term care)  [] Age </= to 50 96

## 2023-05-24 NOTE — OCCUPATIONAL THERAPY INITIAL EVALUATION ADULT - IMPAIRED TRANSFERS: SIT/STAND, REHAB EVAL
POST OPERATIVE NOTE   SECTION        Pre-Op Diagnosis: 30 2/7 wks, HELLP syndrome with rising LFTs on multiple antihypertensive medications, 48 hours post betamethasone, BMI  47    Post-Op Diagnosis: same    Procedure: primary low transvere     Surgeon:  Eliane Way MD  Assist: Melisa Carroll MD    Anesthesia: spinal    Complications: none    EBL: 470 cc    Findings:  Viable male infant  Weight  3#1oz  Apgars pending  normal uterus/tubes/ovaries  Significant edema with visible fluid in all tissue layers         TECHNIQUE   The patient was taken to the operating room where a spinal was placed. She was placed on the operating table in the left lateral tilt position and prepped and draped in the normal sterile fashion. A Pfannenstiel skin incision was created with the scalpel.  Notable visible fluid was within the subcutaneous tissues.   This was carried down to the level of the fascia. The fascia was incised in the  midline and this was extended laterally with the Alonso scissors. The fascia was dissected superiorly and inferiorly off the underlying rectus muscles. The rectus muscles were  in the midline and the underlying parietal peritoneum was identified and entered. This was extended superiorly and inferiorly with good visualization of the bladder.  An  Tru retractor was placed.    The bladder flap was created with the Metzenbaum scissors.   The lower uterine segment was well developed, thus, we were able to make a low transverse uterine incision.   The amniotic fluid was clear. The infant was delivered without difficulty, vertex.  With guidance from the NICU team,  30 seconds of delayed cord clamping was completed.   The umbilical cord was clamped and cut.  The infant was handed off to the waiting NICU staff. Cord gases were.  The placenta was removed with gentle traction.     The uterus was exteriorized and cleared of all clots and debris.  Cytotec 200 mcg buccal (followed by 600 WA)  was given for atony.   The uterine incision was closed in 2 layers using 0 PDS  suture. Hemostasis was noted. Uterus was returned to abdomen.   Irrigation was performed. The abdomen was then closed in layers by first closing the fascia in a running fashion using looped 0 PDS suture. The subcutaneous space was irrigated and reapproximated using 2 layers of interrupted sutures  of 3-0 plain gut suture. The skin was closed with staples.   An Aquacel silver dressing was placed due to high BMI.  The fundus was noted to be firm at the conclusion of the procedure.  The patient was taken to recovery in stable condition with no complications.         Eliane Way MD  DOS 5/24/23            impaired balance/cognition/impaired coordination/decreased strength

## 2023-12-01 NOTE — PROVIDER CONTACT NOTE (OTHER) - DATE AND TIME:
19-Jul-2022 00:00
20-Jul-2022 23:00
22-Jul-2022 13:20
22-Jul-2022 18:47
16-Jul-2022 02:30
26-Jul-2022 01:05
22-Jul-2022 19:40
never

## 2024-03-23 NOTE — CHART NOTE - NSCHARTNOTEFT_GEN_A_CORE
Nutrition Follow Up Note  Source: Medical Record [X] Patient [X] Family [ ]         Diet: Minced and moist, No beef, No pork  Pt tolerating diet with good PO intake, eating >75% of meals per nursing flow sheets. Observed during meal rounds with good PO intake. Denies nausea, vomiting, diarrhea, constipation.     Enteral/Parenteral Nutrition: N/A    Current Weight: 134.7 lbs (7/29)  Recommend obtaining new weight    Pertinent Medications: MEDICATIONS  (STANDING):  amLODIPine   Tablet 10 milliGRAM(s) Oral daily  apixaban 5 milliGRAM(s) Oral two times a day  AQUAPHOR (petrolatum Ointment) 1 Application(s) Topical daily  atorvastatin 80 milliGRAM(s) Oral at bedtime  bisacodyl 5 milliGRAM(s) Oral daily  donepezil 10 milliGRAM(s) Oral daily  famotidine    Tablet 20 milliGRAM(s) Oral daily  hemorrhoidal Ointment 1 Application(s) Rectal daily  levothyroxine 100 MICROGram(s) Oral daily  losartan 12.5 milliGRAM(s) Oral daily  memantine 10 milliGRAM(s) Oral two times a day  metoprolol succinate ER 50 milliGRAM(s) Oral daily  mirtazapine 7.5 milliGRAM(s) Oral at bedtime  polyethylene glycol 3350 17 Gram(s) Oral daily  predniSONE   Tablet   Oral   QUEtiapine 25 milliGRAM(s) Oral <User Schedule>  ramelteon 8 milliGRAM(s) Oral at bedtime    MEDICATIONS  (PRN):  acetaminophen     Tablet .. 650 milliGRAM(s) Oral every 6 hours PRN Temp greater or equal to 38C (100.4F), Mild Pain (1 - 3), Moderate Pain (4 - 6), Severe Pain (7 - 10)  aluminum hydroxide/magnesium hydroxide/simethicone Suspension 30 milliLiter(s) Oral every 4 hours PRN Dyspepsia  LORazepam     Tablet 0.25 milliGRAM(s) Oral at bedtime PRN insomnia  LORazepam   Injectable 0.25 milliGRAM(s) IntraMuscular once PRN Agitation  sodium chloride 0.65% Nasal 1 Spray(s) Both Nostrils five times a day PRN Nasal Congestion      Pertinent Labs:  08-04 Na129 mmol/L<L> Glu 100 mg/dL<H> K+ 4.0 mmol/L Cr  1.02 mg/dL BUN 28 mg/dL<H> 08-04 Alb 2.7 g/dL<L> 07-16 Chol 189 mg/dL LDL --    HDL 61 mg/dL Trig 102 mg/dL        Skin: surgical incision per nursing flow sheets     Edema: No edema per nursing flow sheets     Last BM: on 8/5 Per nursing flowsheets     Estimated Needs:   [X] No Change since Previous Assessment  [ ] Recalculated:     Previous Nutrition Diagnosis:   Swallowing difficulty    Nutrition Diagnosis is [X] Ongoing  - SLP following       New Nutrition Diagnosis: [X] Not Applicable  [ ] Inadequate Protein Energy Intake   [ ] Inadequate Oral Intake   [ ] Excessive Energy Intake   [ ] Increased Nutrient Needs   [ ] Obesity   [ ] Altered GI Function   [ ] Unintended Weight Loss   [ ] Food & Nutrition Related Knowledge Deficit  [ ] Limited Adherence to nutrition related recommendations   [ ] Malnutrition      Interventions:   1. Recommend continuing with current plan of care  2. Follow SLP recommendations    Monitoring & Evaluation:   [X] Weights   [X] PO Intake   [X] Follow Up (Per Protocol)  [X] Tolerance to Diet Prescription   [X] Other: Labs     RD Remains Available.  Alina Lima RD [id number]

## 2024-09-08 NOTE — H&P ADULT - ATTENDING COMMENTS
PAST MEDICAL HISTORY:  Afib on coumadin    Anxiety     BPH (benign prostatic hyperplasia)     Chronic Gout     DM (diabetes mellitus)     Dyslipidemia     H/O cholangitis     History of Obesity     History of Transient Ischemic Attack (TIA) approx 1994    Hypertension     Hypothyroid     Multiple falls now wheelchair bound    Obstructive Sleep Apnea CPAP day    
Pt. seen with resident.  Agree with documentation above as per resident with amendments made as appropriate. Patient medically stable. appropriate for acute rehabilitation.        83 yo Male with PMH of thyroid ca s/p thyroidectomy 2018, HTN, TIA (15-20 years ago), alzheimer's disease, and depression presented to [facility] ED on 7/15 with acute Left facial droop/ L sided weakness and slurring of speech, found to have Right MCA CVA s/p tPA. H/C complicated by COVID infection and sundowning.  Admitted to Acute rehabilitation with cognitive deficits, dysarthria, gait and ADL impairments.

## 2025-05-13 NOTE — PROVIDER CONTACT NOTE (OTHER) - RECOMMENDATIONS
Patient tested + on 7/16/22. Patient no longer requires isolation at this time. Isolation discontinued as per hospital guidelines.
Dopplers
Prep H
Yes
Restraints
eval/tx assess fro safety
Medicate pt for aggression / agitation.  Apply posey vest restraint and B/L wrist restraints  to prevent harm.
Posey vest restraint.  B/L wrist restraint.  Medicate for agitations.  place on constant observation.